# Patient Record
Sex: FEMALE | Race: WHITE | NOT HISPANIC OR LATINO | Employment: OTHER | URBAN - METROPOLITAN AREA
[De-identification: names, ages, dates, MRNs, and addresses within clinical notes are randomized per-mention and may not be internally consistent; named-entity substitution may affect disease eponyms.]

---

## 2020-01-13 ENCOUNTER — HOSPITAL ENCOUNTER (INPATIENT)
Facility: CLINIC | Age: 70
LOS: 14 days | Discharge: NURSING, REHAB OR LONG-TERM CARE | DRG: 020 | End: 2020-01-28
Attending: NEUROLOGICAL SURGERY | Admitting: NEUROLOGICAL SURGERY
Payer: MEDICARE

## 2020-01-13 ENCOUNTER — TRANSFERRED RECORDS (OUTPATIENT)
Dept: HEALTH INFORMATION MANAGEMENT | Facility: CLINIC | Age: 70
End: 2020-01-13

## 2020-01-13 DIAGNOSIS — Q28.3 CEREBRAL CAVERNOMA: Primary | ICD-10-CM

## 2020-01-13 DIAGNOSIS — D18.00 CAVERNOMA: ICD-10-CM

## 2020-01-13 LAB
CREAT SERPL-MCNC: 0.9 MG/DL (ref 0.52–1.04)
GLUCOSE SERPL-MCNC: 154 MG/DL (ref 74–99)
INR PPP: 0.9 (ref 0.9–3.9)
POTASSIUM SERPL-SCNC: 3.5 MMOL/L (ref 0.5–5.1)

## 2020-01-13 ASSESSMENT — MIFFLIN-ST. JEOR: SCORE: 1137.76

## 2020-01-13 NOTE — LETTER
Transition Communication Hand-off for Care Transitions to Next Level of Care Provider    Name: Sheila Barraza  : 1950  MRN #: [id number]  Primary Care Provider: Fior Jensen     Primary Clinic: [address] [address]     Reason for Hospitalization:  brain stem bleed  Cerebral cavernoma  AVM (arteriovenous malformation)  Admit Date/Time: 2020 11:46 PM  Discharge Date: 2020  Payor Source: Payor: MEDICARE / Plan: MEDICARE / Product Type: Medicare /              Reason for Communication Hand-off Referral:   Notification of the discharge plan.    Discharge Plan:      Social Work Services Discharge Note     Patient Name:  Sheila Barraza     Anticipated Discharge Date:  2020     Discharge Disposition:   [facility] ([phone])     Following MD:  Facility Assignment     Pre-Admission Screening (PAS) online form has been completed.  The Level of Care (LOC) is:  Determined  Confirmation Code is:  Not required as pt is admitting to acute rehab setting.  Patient/caregiver informed of referral to [facility] for Pre-Admission Screening for skilled nursing facility (SNF) placement and to expect a phone call post discharge from SNF.     Additional Services/Equipment Arranged:  SW confirmed readiness for discharge with Anna Holloway NP Neuro Surgery. SW confirmed acceptance for admit to  ARU (pending outcome of therapy session today to ensure that pt's blood pressures are stable) with Admissions ([place]).  URBANO arranged for [facility] ([place] [phone]) to provide w/c transport at 2pm.     Patient / Family response to discharge plan:  Pt,  and daughter (Rosaura) voice understanding of the discharge plan and agreement with the discharge plan.     Persons notified of above discharge plan:  Pt,  ([place]), daughter (Rosaura),  nursing and Anna Holloway NP.     Staff Discharge Instructions:  Please fax discharge orders and signed hard scripts  for any controlled substances.  Please print a packet and send with patient.      CTS Handoff completed:  YES     Medicare Notice of Rights provided to the patient/family:  YES     COLLIN Ceballos  Social Work, 6A  Phone:  [phone]  Pager:  [phone]  1/27/2020

## 2020-01-14 ENCOUNTER — APPOINTMENT (OUTPATIENT)
Dept: MRI IMAGING | Facility: CLINIC | Age: 70
DRG: 020 | End: 2020-01-14
Attending: STUDENT IN AN ORGANIZED HEALTH CARE EDUCATION/TRAINING PROGRAM
Payer: MEDICARE

## 2020-01-14 ENCOUNTER — APPOINTMENT (OUTPATIENT)
Dept: CT IMAGING | Facility: CLINIC | Age: 70
DRG: 020 | End: 2020-01-14
Attending: STUDENT IN AN ORGANIZED HEALTH CARE EDUCATION/TRAINING PROGRAM
Payer: MEDICARE

## 2020-01-14 ENCOUNTER — APPOINTMENT (OUTPATIENT)
Dept: SPEECH THERAPY | Facility: CLINIC | Age: 70
DRG: 020 | End: 2020-01-14
Attending: STUDENT IN AN ORGANIZED HEALTH CARE EDUCATION/TRAINING PROGRAM
Payer: MEDICARE

## 2020-01-14 ENCOUNTER — APPOINTMENT (OUTPATIENT)
Dept: PHYSICAL THERAPY | Facility: CLINIC | Age: 70
DRG: 020 | End: 2020-01-14
Attending: STUDENT IN AN ORGANIZED HEALTH CARE EDUCATION/TRAINING PROGRAM
Payer: MEDICARE

## 2020-01-14 PROBLEM — Q28.3 CEREBRAL CAVERNOMA: Status: ACTIVE | Noted: 2020-01-14

## 2020-01-14 LAB
ABO + RH BLD: NORMAL
ABO + RH BLD: NORMAL
ALBUMIN SERPL-MCNC: 3.3 G/DL (ref 3.4–5)
ALP SERPL-CCNC: 60 U/L (ref 40–150)
ALT SERPL W P-5'-P-CCNC: 25 U/L (ref 0–50)
ANION GAP SERPL CALCULATED.3IONS-SCNC: 6 MMOL/L (ref 3–14)
ANION GAP SERPL CALCULATED.3IONS-SCNC: 6 MMOL/L (ref 3–14)
APTT PPP: 32 SEC (ref 22–37)
AST SERPL W P-5'-P-CCNC: 12 U/L (ref 0–45)
BILIRUB SERPL-MCNC: 0.4 MG/DL (ref 0.2–1.3)
BLD GP AB SCN SERPL QL: NORMAL
BLOOD BANK CMNT PATIENT-IMP: NORMAL
BUN SERPL-MCNC: 12 MG/DL (ref 7–30)
BUN SERPL-MCNC: 12 MG/DL (ref 7–30)
CA-I SERPL ISE-MCNC: 5.3 MG/DL (ref 4.4–5.2)
CALCIUM SERPL-MCNC: 9.2 MG/DL (ref 8.5–10.1)
CALCIUM SERPL-MCNC: 9.5 MG/DL (ref 8.5–10.1)
CHLORIDE SERPL-SCNC: 108 MMOL/L (ref 94–109)
CHLORIDE SERPL-SCNC: 109 MMOL/L (ref 94–109)
CO2 SERPL-SCNC: 26 MMOL/L (ref 20–32)
CO2 SERPL-SCNC: 26 MMOL/L (ref 20–32)
CREAT SERPL-MCNC: 0.78 MG/DL (ref 0.52–1.04)
CREAT SERPL-MCNC: 0.85 MG/DL (ref 0.52–1.04)
ERYTHROCYTE [DISTWIDTH] IN BLOOD BY AUTOMATED COUNT: 11.8 % (ref 10–15)
ERYTHROCYTE [DISTWIDTH] IN BLOOD BY AUTOMATED COUNT: 11.8 % (ref 10–15)
FIBRINOGEN PPP-MCNC: 478 MG/DL (ref 200–420)
GFR SERPL CREATININE-BSD FRML MDRD: 69 ML/MIN/{1.73_M2}
GFR SERPL CREATININE-BSD FRML MDRD: 76 ML/MIN/{1.73_M2}
GLUCOSE BLDC GLUCOMTR-MCNC: 93 MG/DL (ref 70–99)
GLUCOSE BLDC GLUCOMTR-MCNC: 95 MG/DL (ref 70–99)
GLUCOSE SERPL-MCNC: 103 MG/DL (ref 70–99)
GLUCOSE SERPL-MCNC: 97 MG/DL (ref 70–99)
HCT VFR BLD AUTO: 36 % (ref 35–47)
HCT VFR BLD AUTO: 38.1 % (ref 35–47)
HGB BLD-MCNC: 12.8 G/DL (ref 11.7–15.7)
HGB BLD-MCNC: 13.2 G/DL (ref 11.7–15.7)
INR PPP: 1 (ref 0.86–1.14)
INTERPRETATION ECG - MUSE: NORMAL
MAGNESIUM SERPL-MCNC: 1.9 MG/DL (ref 1.6–2.3)
MAGNESIUM SERPL-MCNC: 1.9 MG/DL (ref 1.6–2.3)
MCH RBC QN AUTO: 33.1 PG (ref 26.5–33)
MCH RBC QN AUTO: 33.6 PG (ref 26.5–33)
MCHC RBC AUTO-ENTMCNC: 34.6 G/DL (ref 31.5–36.5)
MCHC RBC AUTO-ENTMCNC: 35.6 G/DL (ref 31.5–36.5)
MCV RBC AUTO: 95 FL (ref 78–100)
MCV RBC AUTO: 96 FL (ref 78–100)
MRSA DNA SPEC QL NAA+PROBE: NEGATIVE
PHOSPHATE SERPL-MCNC: 3.5 MG/DL (ref 2.5–4.5)
PHOSPHATE SERPL-MCNC: 3.7 MG/DL (ref 2.5–4.5)
PLATELET # BLD AUTO: 207 10E9/L (ref 150–450)
PLATELET # BLD AUTO: 247 10E9/L (ref 150–450)
POTASSIUM SERPL-SCNC: 3.6 MMOL/L (ref 3.4–5.3)
POTASSIUM SERPL-SCNC: 3.9 MMOL/L (ref 3.4–5.3)
PROT SERPL-MCNC: 6.6 G/DL (ref 6.8–8.8)
RBC # BLD AUTO: 3.81 10E12/L (ref 3.8–5.2)
RBC # BLD AUTO: 3.99 10E12/L (ref 3.8–5.2)
SODIUM SERPL-SCNC: 140 MMOL/L (ref 133–144)
SODIUM SERPL-SCNC: 142 MMOL/L (ref 133–144)
SPECIMEN EXP DATE BLD: NORMAL
SPECIMEN SOURCE: NORMAL
WBC # BLD AUTO: 6.4 10E9/L (ref 4–11)
WBC # BLD AUTO: 6.4 10E9/L (ref 4–11)

## 2020-01-14 PROCEDURE — 92610 EVALUATE SWALLOWING FUNCTION: CPT | Mod: GN

## 2020-01-14 PROCEDURE — 25000125 ZZHC RX 250: Performed by: STUDENT IN AN ORGANIZED HEALTH CARE EDUCATION/TRAINING PROGRAM

## 2020-01-14 PROCEDURE — 36415 COLL VENOUS BLD VENIPUNCTURE: CPT | Performed by: NEUROLOGICAL SURGERY

## 2020-01-14 PROCEDURE — 85730 THROMBOPLASTIN TIME PARTIAL: CPT | Performed by: NEUROLOGICAL SURGERY

## 2020-01-14 PROCEDURE — 82330 ASSAY OF CALCIUM: CPT | Performed by: NEUROLOGICAL SURGERY

## 2020-01-14 PROCEDURE — 25000128 H RX IP 250 OP 636: Performed by: STUDENT IN AN ORGANIZED HEALTH CARE EDUCATION/TRAINING PROGRAM

## 2020-01-14 PROCEDURE — 93010 ELECTROCARDIOGRAM REPORT: CPT | Performed by: INTERNAL MEDICINE

## 2020-01-14 PROCEDURE — 80053 COMPREHEN METABOLIC PANEL: CPT | Performed by: NEUROLOGICAL SURGERY

## 2020-01-14 PROCEDURE — 87641 MR-STAPH DNA AMP PROBE: CPT | Performed by: NEUROLOGICAL SURGERY

## 2020-01-14 PROCEDURE — 85610 PROTHROMBIN TIME: CPT | Performed by: NEUROLOGICAL SURGERY

## 2020-01-14 PROCEDURE — 25500064 ZZH RX 255 OP 636: Performed by: NEUROLOGICAL SURGERY

## 2020-01-14 PROCEDURE — 40000141 ZZH STATISTIC PERIPHERAL IV START W/O US GUIDANCE

## 2020-01-14 PROCEDURE — 25000132 ZZH RX MED GY IP 250 OP 250 PS 637: Performed by: STUDENT IN AN ORGANIZED HEALTH CARE EDUCATION/TRAINING PROGRAM

## 2020-01-14 PROCEDURE — 97162 PT EVAL MOD COMPLEX 30 MIN: CPT | Mod: GP

## 2020-01-14 PROCEDURE — 86850 RBC ANTIBODY SCREEN: CPT | Performed by: NEUROLOGICAL SURGERY

## 2020-01-14 PROCEDURE — 12000001 ZZH R&B MED SURG/OB UMMC

## 2020-01-14 PROCEDURE — 83735 ASSAY OF MAGNESIUM: CPT | Performed by: NEUROLOGICAL SURGERY

## 2020-01-14 PROCEDURE — 70544 MR ANGIOGRAPHY HEAD W/O DYE: CPT

## 2020-01-14 PROCEDURE — A9585 GADOBUTROL INJECTION: HCPCS | Performed by: NEUROLOGICAL SURGERY

## 2020-01-14 PROCEDURE — 97116 GAIT TRAINING THERAPY: CPT | Mod: GP

## 2020-01-14 PROCEDURE — 84100 ASSAY OF PHOSPHORUS: CPT | Performed by: NEUROLOGICAL SURGERY

## 2020-01-14 PROCEDURE — 70450 CT HEAD/BRAIN W/O DYE: CPT

## 2020-01-14 PROCEDURE — 80048 BASIC METABOLIC PNL TOTAL CA: CPT | Performed by: NEUROLOGICAL SURGERY

## 2020-01-14 PROCEDURE — 97530 THERAPEUTIC ACTIVITIES: CPT | Mod: GP

## 2020-01-14 PROCEDURE — 85027 COMPLETE CBC AUTOMATED: CPT | Performed by: NEUROLOGICAL SURGERY

## 2020-01-14 PROCEDURE — 92526 ORAL FUNCTION THERAPY: CPT | Mod: GN

## 2020-01-14 PROCEDURE — 70553 MRI BRAIN STEM W/O & W/DYE: CPT

## 2020-01-14 PROCEDURE — 85384 FIBRINOGEN ACTIVITY: CPT | Performed by: NEUROLOGICAL SURGERY

## 2020-01-14 PROCEDURE — 86900 BLOOD TYPING SEROLOGIC ABO: CPT | Performed by: NEUROLOGICAL SURGERY

## 2020-01-14 PROCEDURE — 25800030 ZZH RX IP 258 OP 636: Performed by: STUDENT IN AN ORGANIZED HEALTH CARE EDUCATION/TRAINING PROGRAM

## 2020-01-14 PROCEDURE — 00000146 ZZHCL STATISTIC GLUCOSE BY METER IP

## 2020-01-14 PROCEDURE — 86901 BLOOD TYPING SEROLOGIC RH(D): CPT | Performed by: NEUROLOGICAL SURGERY

## 2020-01-14 PROCEDURE — 87640 STAPH A DNA AMP PROBE: CPT | Performed by: NEUROLOGICAL SURGERY

## 2020-01-14 PROCEDURE — 25000132 ZZH RX MED GY IP 250 OP 250 PS 637: Mod: GY | Performed by: STUDENT IN AN ORGANIZED HEALTH CARE EDUCATION/TRAINING PROGRAM

## 2020-01-14 RX ORDER — LABETALOL 20 MG/4 ML (5 MG/ML) INTRAVENOUS SYRINGE
10 EVERY 10 MIN PRN
Status: DISCONTINUED | OUTPATIENT
Start: 2020-01-14 | End: 2020-01-28 | Stop reason: HOSPADM

## 2020-01-14 RX ORDER — POTASSIUM CHLORIDE 1.5 G/1.58G
20-40 POWDER, FOR SOLUTION ORAL
Status: DISCONTINUED | OUTPATIENT
Start: 2020-01-14 | End: 2020-01-28 | Stop reason: HOSPADM

## 2020-01-14 RX ORDER — GADOBUTROL 604.72 MG/ML
7.5 INJECTION INTRAVENOUS ONCE
Status: COMPLETED | OUTPATIENT
Start: 2020-01-14 | End: 2020-01-14

## 2020-01-14 RX ORDER — NALOXONE HYDROCHLORIDE 0.4 MG/ML
.1-.4 INJECTION, SOLUTION INTRAMUSCULAR; INTRAVENOUS; SUBCUTANEOUS
Status: DISCONTINUED | OUTPATIENT
Start: 2020-01-14 | End: 2020-01-22

## 2020-01-14 RX ORDER — HYDROMORPHONE HYDROCHLORIDE 1 MG/ML
INJECTION, SOLUTION INTRAMUSCULAR; INTRAVENOUS; SUBCUTANEOUS
Status: DISCONTINUED
Start: 2020-01-14 | End: 2020-01-14 | Stop reason: HOSPADM

## 2020-01-14 RX ORDER — POTASSIUM CHLORIDE 7.45 MG/ML
10 INJECTION INTRAVENOUS
Status: DISCONTINUED | OUTPATIENT
Start: 2020-01-14 | End: 2020-01-28 | Stop reason: HOSPADM

## 2020-01-14 RX ORDER — AMLODIPINE BESYLATE 5 MG/1
5 TABLET ORAL 2 TIMES DAILY
Status: ON HOLD | COMMUNITY
End: 2020-01-26

## 2020-01-14 RX ORDER — POTASSIUM CL/LIDO/0.9 % NACL 10MEQ/0.1L
10 INTRAVENOUS SOLUTION, PIGGYBACK (ML) INTRAVENOUS
Status: DISCONTINUED | OUTPATIENT
Start: 2020-01-14 | End: 2020-01-28 | Stop reason: HOSPADM

## 2020-01-14 RX ORDER — CLONAZEPAM 0.5 MG/1
0.5 TABLET ORAL 2 TIMES DAILY PRN
Status: DISCONTINUED | OUTPATIENT
Start: 2020-01-14 | End: 2020-01-28 | Stop reason: HOSPADM

## 2020-01-14 RX ORDER — HYDRALAZINE HYDROCHLORIDE 20 MG/ML
10-20 INJECTION INTRAMUSCULAR; INTRAVENOUS
Status: DISCONTINUED | OUTPATIENT
Start: 2020-01-14 | End: 2020-01-28 | Stop reason: HOSPADM

## 2020-01-14 RX ORDER — LISINOPRIL 10 MG/1
10 TABLET ORAL DAILY
Status: ON HOLD | COMMUNITY
End: 2020-01-26

## 2020-01-14 RX ORDER — MAGNESIUM SULFATE HEPTAHYDRATE 40 MG/ML
4 INJECTION, SOLUTION INTRAVENOUS EVERY 4 HOURS PRN
Status: DISCONTINUED | OUTPATIENT
Start: 2020-01-14 | End: 2020-01-28 | Stop reason: HOSPADM

## 2020-01-14 RX ORDER — ATORVASTATIN CALCIUM 10 MG/1
10 TABLET, FILM COATED ORAL DAILY
Status: ON HOLD | COMMUNITY
End: 2020-01-27

## 2020-01-14 RX ORDER — HYDROMORPHONE HCL/0.9% NACL/PF 0.2MG/0.2
0.2 SYRINGE (ML) INTRAVENOUS ONCE
Status: COMPLETED | OUTPATIENT
Start: 2020-01-14 | End: 2020-01-14

## 2020-01-14 RX ORDER — ACETAMINOPHEN 325 MG/1
325-650 TABLET ORAL EVERY 4 HOURS PRN
Status: DISCONTINUED | OUTPATIENT
Start: 2020-01-14 | End: 2020-01-28 | Stop reason: HOSPADM

## 2020-01-14 RX ORDER — POTASSIUM CHLORIDE 29.8 MG/ML
20 INJECTION INTRAVENOUS
Status: DISCONTINUED | OUTPATIENT
Start: 2020-01-14 | End: 2020-01-28 | Stop reason: HOSPADM

## 2020-01-14 RX ORDER — SODIUM CHLORIDE 9 MG/ML
INJECTION, SOLUTION INTRAVENOUS CONTINUOUS
Status: DISCONTINUED | OUTPATIENT
Start: 2020-01-14 | End: 2020-01-14

## 2020-01-14 RX ORDER — LISINOPRIL 20 MG/1
20 TABLET ORAL DAILY
Status: DISCONTINUED | OUTPATIENT
Start: 2020-01-14 | End: 2020-01-15

## 2020-01-14 RX ORDER — CLONAZEPAM 0.5 MG/1
0.5 TABLET ORAL 2 TIMES DAILY PRN
Status: ON HOLD | COMMUNITY
End: 2020-01-26

## 2020-01-14 RX ORDER — POTASSIUM CHLORIDE 750 MG/1
20-40 TABLET, EXTENDED RELEASE ORAL
Status: DISCONTINUED | OUTPATIENT
Start: 2020-01-14 | End: 2020-01-28 | Stop reason: HOSPADM

## 2020-01-14 RX ORDER — ATORVASTATIN CALCIUM 20 MG/1
20 TABLET, FILM COATED ORAL EVERY EVENING
Status: DISCONTINUED | OUTPATIENT
Start: 2020-01-14 | End: 2020-01-28 | Stop reason: HOSPADM

## 2020-01-14 RX ADMIN — Medication 2 G: at 05:30

## 2020-01-14 RX ADMIN — Medication 0.5 MG: at 10:04

## 2020-01-14 RX ADMIN — LISINOPRIL 20 MG: 20 TABLET ORAL at 11:46

## 2020-01-14 RX ADMIN — Medication 10 MEQ: at 06:44

## 2020-01-14 RX ADMIN — ATORVASTATIN CALCIUM 20 MG: 20 TABLET, FILM COATED ORAL at 19:37

## 2020-01-14 RX ADMIN — Medication 0.2 MG: at 01:51

## 2020-01-14 RX ADMIN — Medication 0.5 MG: at 17:17

## 2020-01-14 RX ADMIN — SODIUM CHLORIDE: 9 INJECTION, SOLUTION INTRAVENOUS at 01:15

## 2020-01-14 RX ADMIN — ACETAMINOPHEN 650 MG: 325 TABLET, FILM COATED ORAL at 17:14

## 2020-01-14 RX ADMIN — SODIUM CHLORIDE 1000 ML: 9 INJECTION, SOLUTION INTRAVENOUS at 01:30

## 2020-01-14 RX ADMIN — GADOBUTROL 7 ML: 604.72 INJECTION INTRAVENOUS at 11:13

## 2020-01-14 ASSESSMENT — ACTIVITIES OF DAILY LIVING (ADL)
ADLS_ACUITY_SCORE: 13

## 2020-01-14 NOTE — CONSULTS
Neuroscience Intensive Care Consult   Izabel Barraza is a 69-year-old female with a history of trigeminal neuralgia, hypertension on lisinopril and amlodipine, and hernia who presents with concern of stroke-like symptoms. Pt presented to OSH with wobbly gait, dysequilibrium, right arm and left paresthesia and feeling generally weak and inability to walk. She underwent CT head that showed hyperdensity in the pontomedullary area. The patient had similar presentation to her local hospital on 12/23 and also she was found to have hyperdense signal on CT head in pontomedullary area as well. Today since she had recurrence of her symptoms, so she was transferred to have further work up. Of note, the patient had had planned to be evaluated at the [facility] on 01/15/2020, but was unable to present due to this episode on 01/13/2020.    The patient states that her cavernoma is known to have existed since at least 2015.  However, there is report that states that there is concern for a large draining vein at the pontomedullary junction back in 2012, based on  MRI.         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    Family History: No family history on file.    Social History:   Social History     Tobacco Use     Smoking status: Not on file   Substance Use Topics     Alcohol use: Not on file       ROS: Review Of Systems  Skin: negative  Eyes: negative  Ears/Nose/Throat: negative  Respiratory: No shortness of breath, dyspnea on exertion, cough, or hemoptysis  Cardiovascular: negative  Gastrointestinal: negative  Genitourinary: negative  Musculoskeletal: negative  Psychiatric: negative  Hematologic/Lymphatic/Immunologic: negative  Endocrine: negative     Vital Signs:  B/P: Data Unavailable, T: Data Unavailable, P: Data Unavailable, R: Data Unavailable    Intake/Output Last 24Hrs :   Is charting     Infusions: NS 0.9% at  rate of 75 ml/hr     EVD Settings:  EVD @ not inserted   Output: na    Physical Examination:  General: NAD  HEENT: normocephalic atraumatic   Cardiovascular: RRR  Pulmonary: clear breath sounds BL   Neuro:  MS: AO X 3. No aphasia and no dysarthria.   CN: II-XII intact. Face symmetric and EOMI. PERRLA  Motor: no drift. 5/5 all through.   Reflexes: deferred   Sensation: LT, PP and positional sensation intact x 4 extremities   Coordination: FNF and HST are intact, no dysmetria  Gait: Not assessed due to pt sleeping.    Labs/Studies:  BMP: Unremarkable   CBC: unremarkable   INR: 1    Imaging:  Initial Head CT 01/13/20      ICH Score Tool Patient's Score   Age ? 80 years 1 point 0   GCS score  3-4  5-12   13-15    2 point  1 point  0 point 0   Hematoma volume, cm3 ? 30 1 point 0   Intraventricular extension 1 point 0   Infratentorial location 1 point 1   Patient s ICH Score (0-6) = 1       Assessment and Plan:  Sheila Barraza is a 69 year old year old with pontomedullary ICH, ICH Score 1.     Neuro:  #Pontomeduallry ICH, ICH Score = 1, NIHSS = 0, Etiology Cavernoma. Admitted for potential surgical treatment  Initial head CT shows small pontomedullary bleed .  - SBP Goal < 140  - Repeat head CT in 6 hours  - No indication for seizure ppx     # no evidence of Hydrocephalus    #Trigeminal Neuralgia  undergone 2 balloon rhizotomies at the [facility], on the left side.     Cardiovascular:  No acute issues    HTN- hold pta lisinopril and amlodipine     Continuous cardiac monitoring     Goal SBP <  140    Respiratory:  #no acute issues  -Pulse Ox    Gastrointestinal:  # npo for possible surgery    Renal:  #no acute issues   IVF: 75 ns 0.9%     Endocrine:  #glucose 95    Hematological  #no acute issues    ID  T max 98  WBC 6.4  - Pan-culture if spike fever     DVT ppx: SCD  Lines: art line,   Code: Full    KACY [facility]  Neurology PGY3  p [phone]

## 2020-01-14 NOTE — PLAN OF CARE
Discharge Planner PT   Patient plan for discharge: would prefer home over rehab  Current status: Amb ~250ft with single handhold on IV pole and CGAx1. 3-4 LOB to R/crossing feet L over R but able to correct every instance.  STS x4 during session with hand hold from PT or on IV pole and CGAx1-min Ax1 due to 2 occasions of minor LOB. Tranfers from chair to commode with min Ax1  Barriers to return to prior living situation: impaired balance, medical status  Recommendations for discharge: TCU currently but may be safe to discharge home with A if possible from family.   Rationale for recommendations: Pt will need A with  because she is his primary care taker.        Entered by: Gumaro Chacon 01/14/2020 12:13 PM

## 2020-01-14 NOTE — PLAN OF CARE
Major Shift Events:  Admitted to 4A, A&OX4, PERRL, Q1hr neruos, at 0600 check patient stated new tingling in RUE, denies numbness or tingling in LUE, LLE, RLE. Impaired balance, can not take more than a few steps without significant impairment. Goal of Systolic BP less than 140, maintained overnight; NSR with no signs of ectopy; lung sounds are clear bilaterally, sating adequately on room air; NPO for swallow eval today, 1 assist to the commode to void; denies pain. CT done overnight. Unable to place A line overnight, will discuss in AM.   Plan: MRI in the AM, decide whether to pursue surgery or manage medically.   For vital signs and complete assessments, please see documentation flowsheets.

## 2020-01-14 NOTE — PLAN OF CARE
OT 4A. Hold. OT orders acknowledged and appreciated. Per conversation with PT, pt primary deficits are balance at this time. PT to continue to follow to address functional mobility. OT to follow from a periphery.

## 2020-01-14 NOTE — CONSULTS
"Social Work: Assessment with Discharge Plan    Patient Name:  Sheila Barraza  :  1950  Age:  69 year old  MRN:  [id number]  Risk/Complexity Score:  Filed Complexity Screen Score: 9  Completed assessment with:  Patient, Calos (Spouse), Nica (Daughter) and Bulmaro (Son)    Presenting Information   Reason for Referral:  Discharge plan  Date of Intake:  2020  Referral Source:  Physician  Decision Maker:  Patient/ Self  Alternate Decision Maker:  [place] (Spouse) per [facility] Policy  Health Care Directive:  Declined completing  Living Situation:  Pt lives with her spouse (Calos) in a house in [place]  Previous Functional Status:  Independent  Patient and family understanding of hospitalization:  Pt has a good understanding, reports that providers are waiting to hear back from [facility] (she had an appointment at [facility] on 1/15)  Cultural/Language/Spiritual Considerations:  Yazidi  Adjustment to Illness:  Pt appears to be adjusting appropriately, reports that she is \"ready to get home and back to life\"    Physical Health  Reason for Admission:  Stroke-like symptoms, found to have pontomedullary ICH,   Services Needed/Recommended:  TCU, may progress to discharge home    Mental Health/Chemical Dependency  Diagnosis:  None  Support/Services in Place:  None  Services Needed/Recommended:  N/A    Support System  Significant relationship at present time:  Calos (Spouse)-  Pt is his caregiver.  Family of origin is available for support:  Nica (Daughter), Bulmaro (Son) and Grant (Son) all live nearby and could provide support PRN.  Other support available:  None  Gaps in support system:  None  Patient is caregiver to:  Spouse / Significant Other:  Per family, pt's spouse has numerous health issues.  Pt helps him with driving and managing his ileostomy.  Pt's children are caregiving for him while pt is hospitalized and can continue to assist while pt is recovering.     Provider Information   Primary Care Physician:  No " primary care provider on file.   None   Clinic:  No primary physician on file.      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  Financial Concerns:  None Identified  Insurance:    Payor/Plan Subscriber Name Rel Member # Group #   MEDICARE - MEDICARE TANIKA RIVAS [id number]       ATTN CLAIMS, [address]   BCBS - BCBS OUT OF ST* TANIKA RIVAS  [id number]      [address] [address]       Discharge Plan   Patient and family discharge goal:  Pt stated that he goal is to discharge home.  Provided education on discharge plan:  YES  Patient agreeable to discharge plan:  Pt is hoping to progress to discharge home  A list of Medicare Certified Facilities was provided to the patient and/or family to encourage patient choice. Patient's choices for facility are:  N/A  Will NH provide Skilled rehabilitation or complex medical:  YES  General information regarding anticipated insurance coverage and possible out of pocket cost was discussed. Patient and patient's family are aware patient may incur the cost of transportation to the facility, pending insurance payment: NO  Barriers to discharge:  Medical Stability    Discharge Recommendations   Anticipated Disposition:  TCU currently recommended, likely will progress to discharge home  Transportation Needs:  TBD  Name of Transportation Company and Phone:  TBD    Additional comments   URBANO met with pt, Calos (Spouse), Nica (Daughter) and Bulmaro (Son) to introduce self, explain role and provide support.  Pt/ family are from [place], family is staying at the [facility] while pt is hospitalized.  Pt's children are able to assist pt's spouse while she is hospitalized/ recovering (pt is his caregiver).  URBANO discussed current recommendation of a TCU, pt is hopeful that she will progress to discharge home.   will continue to follow to  provide support and continue discharge plans as identified.    ARTIS Arredondo, [facility]  ICU   [facility]  Phone:  [phone]  Pager:  [phone]

## 2020-01-14 NOTE — PROGRESS NOTES
"   01/14/20 0857   General Information   Onset Date 01/13/20   Start of Care Date 01/14/20   Referring Physician Champ Post MD   Patient Profile Review/OT: Additional Occupational Profile Info See Profile for full history and prior level of function   Patient/Family Goals Statement Pt did not state   Swallowing Evaluation Bedside swallow evaluation   Behaviorial Observations WFL (within functional limits)   Mode of current nutrition NPO   Respiratory Status Room air   Comments Izabel Barraza is a 69-year-old female with a history of trigeminal neuralgia, hypertension on lisinopril and amlodipine, and hernia who presents with concern of stroke-like symptoms. Pt presented to OSH with wobbly gait, dysequilibrium, right arm and left paresthesia and feeling generally weak and inability to walk. She underwent CT head that showed hyperdensity in the pontomedullary area. The patient had similar presentation to her local hospital on 12/23 and also she was found to have hyperdense signal on CT head in pontomedullary area as well. Today since she had recurrence of her symptoms, so she was transferred to have further work up. Of note, the patient had had planned to be evaluated at the [facility] on 01/15/2020, but was unable to present due to this episode on 01/13/2020. Pt endorses vague and transient symptoms of dysphagia marked by food/liquids getting \"hung up\" in her throat. Clinical swallow eval completed per MD orders to further assess oropharyngeal swallow function.    Clinical Swallow Evaluation   Oral Musculature   (minimal R sided facial weakness)   Structural Abnormalities none present   Dentition present and adequate   Mucosal Quality dry   Mandibular Strength and Mobility intact   Oral Labial Strength and Mobility WFL   Lingual Strength and Mobility WFL   Velar Elevation intact   Buccal Strength and Mobility intact   Laryngeal Function Cough;Throat clear;Swallow;Voicing initiated   Oral Musculature Comments " Minimal r sided facial weakness   Additional Documentation Yes   Clinical Swallow Eval: Thin Liquid Texture Trial   Mode of Presentation, Thin Liquids cup;self-fed   Volume of Liquid or Food Presented 4 oz   Oral Phase of Swallow Premature pharyngeal entry   Pharyngeal Phase of Swallow impaired;throat clearing   Diagnostic Statement thin liquids via cup resulted in throat clearing x1. no other overt s/sx of aspiration    Clinical Swallow Eval: Puree Solid Texture Trial   Mode of Presentation, Puree spoon;self-fed   Volume of Puree Presented 6 tbsp   Oral Phase, Puree WFL   Pharyngeal Phase, Puree intact   Diagnostic Statement Pt tolerated pureed textures via spoon with no overt s/sx of aspiration    Clinical Swallow Eval: Solid Food Texture Trial   Mode of Presentation, Solid self-fed   Volume of Solid Food Presented 4 tbsp   Oral Phase, Solid WFL   Pharyngeal Phase, Solid intact   Diagnostic Statement Pt tolerated regular solid textures with no overt s/sx of aspiration    VFSS Evaluation   VFSS Additional Documentation No   FEES Evaluation   Additional Documentation No   Swallow Compensations   Swallow Compensations No compensations were used   Results No compensations were used   General Therapy Interventions   Planned Therapy Interventions Dysphagia Treatment   Dysphagia treatment Compensatory strategies for swallowing;Instruction of safe swallow strategies   Swallow Eval: Clinical Impressions   Skilled Criteria for Therapy Intervention Skilled criteria met.  Treatment indicated.   Functional Assessment Scale (FAS) 5   Treatment Diagnosis mild oropharyngeal dysphagia    Diet texture recommendations Regular diet;Thin liquids   Recommended Feeding/Eating Techniques alternate between small bites and sips of food/liquid;check mouth frequently for oral residue/pocketing;hard swallow w/ each bite or sip;maintain upright posture during/after eating for 30 mins;small sips/bites   Demonstrates Need for Referral to Another  "Service physical therapy;occupational therapy;dietitian;respiratory therapy   Therapy Frequency 5x/week   Predicted Duration of Therapy Intervention (days/wks) 1 week   Anticipated Discharge Disposition inpatient rehabilitation facility   Risks and Benefits of Treatment have been explained. Yes   Patient, family and/or staff in agreement with Plan of Care Yes   Clinical Impression Comments SLP: Clinical swallow eval completed per MD orders. Pt presents with mild oropharyngeal dysphagia and minimal R sided facial weakness. Pt endorses transient difficulty swallowing for the past ~2 weeks marked by food/liquids getting \"hung up\" in throat. Thin liquids via cup resulted in throat clearing x1. No other overt s/sx of aspiration on thin liquids via cup, pureed textures, and regular solid textures. Recommend pt initiate regular textures and thin liquids. Pt should be fully upright for all PO, take small sips/bites, slow pacing, and alternate between consistencies. In the setting of pts brainstem cavernoma and new onset dysphagia, further recommend video swallow study to objectively assess aspiration risk. MD to order VFSS as appropriate pending surgical plans. ST to continue to follow. Pt may require ST follow up at discharge pending progress.    Total Evaluation Time   Total Evaluation Time (Minutes) 15     "

## 2020-01-14 NOTE — H&P
Admitted:     01/13/2020      NEUROSURGERY HISTORY AND PHYSICIAL      HISTORY OF PRESENT ILLNESS:  Izabel Barraza is a 69-year-old female with a history of trigeminal neuralgia who presented to an outside hospital with concern of stroke-like symptoms in the setting of a known brainstem cavernoma.      The patient had reported right-sided weakness and inability to stand at the outside hospital beginning at 0900 on 1/13/20  The patient underwent head CT that revealed a concern of hyperdensity in the pontomedullary junction.  Of note, the patient had actually presented to her local hospital on 12/23/2019, with similar symptoms.  The patient had also findings of a hyperdense lesion at that time at the pontomedullary junction.  She had intended to followup at the [facility] with her neurosurgeon regarding this lesion and for management of her trigeminal neuralgia.  Due to concern for recurrent symptoms, the patient was transferred down to the [facility] for further evaluation and treatment of a concern of a cerebral brainstem cavernoma.      The patient states that her cavernoma is known to have existed since at least 2015.  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 Of note, the patient has undergone 2 balloon rhizotomies at the [facility], on the left side.  She states that at this time, her deficits include imbalance with ambulation and dizziness.  She also has numbness in the left side of the face, though this is baseline since her rhizotomies.  She has been having difficulty swallowing and difficulty singing.  She denies blurry vision.  She further thinks that her strength is without deficit.  The patient previously had numbness and tingling in both arms; however, this has subsided.      Of note, the patient had had planned to be evaluated at the [facility] on 01/14/2020, but was unable to  present due to this episode on 01/13/2020.      PAST MEDICAL HISTORY:  Hypertension, hyperlipidemia, trigeminal neuralgia.        PAST SURGICAL HISTORY:  Two rhizotomies and inguinal hernia repair.      FAMILY HISTORY:  Notable for family members with trigeminal neuralgia.      SOCIAL HISTORY:  The patient is primary caretaker for her  who has Wegener's granulomatosis and also a pituitary tumor that has been resected.  The patient enjoys walking 4 miles daily.  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  She continues to live independently.  The patient has 2 children.      REVIEW OF SYSTEMS:  Negative unless as detailed in history of present illness.      PHYSICAL EXAMINATION:   Temp:  [97.6  F (36.4  C)-98.3  F (36.8  C)] 97.9  F (36.6  C)  Pulse:  [80-92] 92  Heart Rate:  [68-97] 68  Resp:  [16-18] 18  BP: ()/(58-94) 127/80  SpO2:  [92 %-100 %] 98 %     Awake and alert.   Oriented x 4.  Extraocular muscles are intact.     Left-sided facial sensation is diminished in all V1, V2 and V3 distributions.   Tongue protrusion symmetric.     Shoulder shrug is symmetric.   Absent gag reflex.   Palate elevation is minimal bilaterally.     5/5 strength in bilateral upper and lower extremities.   No pronator drift.   Rapid finger tapping bilaterally in both hands is symmetric.     No evidence of clonus or Mckeon's reflexes bilaterally.   No hyperreflexia at the biceps or patellar tendons.   Heel-shin is intact.   Standing is grossly impaired as is ambulating without assist.  The patient appears grossly discoordinated with ambulation.   SENSATION:  Grossly intact to light touch.      LABORATORY DATA:  Glucose is 95.   Labs remain pending at this time.      IMAGING:  Head CT reveals evidence of a cavernoma at the pontomedullary junction measuring about 11 mm in the AP diameter.  Hemorrhage edema is present in the medulla.   Cortical atrophy present.  No other supratentorial  hemorrhage.  No hydrocephalus at present.        ASSESSMENT:  Concern for brainstem cavernoma after hemorrhage and new neurologic deficits.      RECOMMENDATIONS:   1.  Admit to ICU.   2.  Systolic blood pressure less than 140.   3.  MRI brain with and without contrast, thin slices extending down to medulla.     4.  Surgical plan is pending.   5.  Two hour neuro checks.   6.  Continuous cardiac monitoring.   7.  Continuous pulse oximetry.     8.  Admission is warranted for frequent neuro checks.        This patient was discussed with Dr. Kemi Gage, who agrees with the above stated plan.         DAVID CAMPBELL MD       As dictated by BHAVNA LIRA MD            D: 2020   T: 2020   MT: JULIET      Name:     TANIKA RIVAS   MRN:      [id number]        Account:      [id number]   :      1950        Admitted:     2020                   Document: [id number]

## 2020-01-14 NOTE — PROGRESS NOTES
01/14/20 1200   Quick Adds   Type of Visit Initial PT Evaluation   Living Environment   Lives With spouse   Living Arrangements condominium   Home Accessibility no concerns   Transportation Anticipated family or friend will provide;car, drives self   Living Environment Comment At baseline pt cares for  (dressing changes, drives to appointments, minimal mobility A needed.)  safe to be home alone for periods of time. Pt is very active, before symtpoms started in mid Dec was walking 4 miles/day.  Since symptoms has been much more limited in activity. IND at baseline with all mobility and ADLs.     Self-Care   Usual Activity Tolerance excellent   Current Activity Tolerance moderate   Regular Exercise Yes   Activity/Exercise Type walking   Exercise Amount/Frequency   (4 miles /day)   Equipment Currently Used at Home none   Functional Level Prior   Ambulation 0-->independent   Transferring 0-->independent   Toileting 0-->independent   Bathing 0-->independent   Communication 0-->understands/communicates without difficulty   Swallowing 0-->swallows foods/liquids without difficulty   Cognition 0 - no cognition issues reported   Fall history within last six months no   Which of the above functional risks had a recent onset or change? ambulation;transferring   General Information   Onset of Illness/Injury or Date of Surgery - Date 01/13/20   Referring Physician Champ Post MD   Patient/Family Goals Statement return home   Pertinent History of Current Problem (include personal factors and/or comorbidities that impact the POC) 69-year-old female with a history of trigeminal neuralgia, hypertension, and hernia who presents with concern of stroke-like symptoms.  The patient had reported right-sided weakness and inability to stand at the outside hospital.  The patient underwent head CT that revealed a concern of hyperdensity in the pontomedullary junction.  Of note, the patient had actually presented to her local  [facility] on 12/23/2019, with similar symptoms.  The patient had also findings of a hyperdense lesion at that time at the pontomedullary junction.  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  The patient states that her cavernoma is known to have existed since at least 2015.  However, there is report that states that there is concern for a large draining vein at the pontomedullary junction back in 2012, based on  MRI.  The patient has undergone 2 balloon rhizotomies at the [facility], on the left side.  She states that at this time, the patient has had a tremendous imbalance and dizziness.  She also has numbness in the left side of the face.  She has been having difficulty swallowing and difficulty seeing.  She has had no geraldine diplopia, however.  She further thinks that her strength is at baseline.  The patient previously had numbness and tingling in both arms; however, this has subsided.    Precautions/Limitations fall precautions   Heart Disease Risk Factors Age   Cognitive Status Examination   Orientation orientation to person, place and time   Level of Consciousness alert   Follows Commands and Answers Questions 100% of the time   Personal Safety and Judgment intact   Memory intact   Posture    Posture Protracted shoulders;Kyphosis   Range of Motion (ROM)   ROM Comment WFL   Strength   Strength Comments WFL   Bed Mobility   Bed Mobility Comments not tested today   Transfer Skills   Transfer Comments STS with CGAx1-min A to maintain balance   Gait   Gait Comments Ambulates ~250ft with hand hold on IV pole and CGA-Malachi   Balance   Balance Comments fair standing balance   General Therapy Interventions   Planned Therapy Interventions balance training;bed mobility training;gait training;motor coordination training;neuromuscular re-education;strengthening;transfer  "training;risk factor education;home program guidelines;progressive activity/exercise   Clinical Impression   Criteria for Skilled Therapeutic Intervention yes, treatment indicated   PT Diagnosis impaired functional mobility   Influenced by the following impairments impaired balance   Functional limitations due to impairments IND mobility   Clinical Presentation Evolving/Changing   Clinical Presentation Rationale clinical judgement   Clinical Decision Making (Complexity) Moderate complexity   Therapy Frequency 5x/week   Predicted Duration of Therapy Intervention (days/wks) 1 wk   Anticipated Equipment Needs at Discharge front wheeled walker   Anticipated Discharge Disposition Home with Assist;Home with Outpatient Therapy   Risk & Benefits of therapy have been explained Yes   Patient, Family & other staff in agreement with plan of care Yes   [facility] TM \"6 Clicks\"   2016, Trustees of [facility], under license to [employer].  All rights reserved.   6 Clicks Short Forms Basic Mobility Inpatient Short Form   [facility]  \"6 Clicks\" V.2 Basic Mobility Inpatient Short Form   1. Turning from your back to your side while in a flat bed without using bedrails? 3 - A Little   2. Moving from lying on your back to sitting on the side of a flat bed without using bedrails? 2 - A Lot   3. Moving to and from a bed to a chair (including a wheelchair)? 3 - A Little   4. Standing up from a chair using your arms (e.g., wheelchair, or bedside chair)? 3 - A Little   5. To walk in hospital room? 3 - A Little   6. Climbing 3-5 steps with a railing? 2 - A Lot   Basic Mobility Raw Score (Score out of 24.Lower scores equate to lower levels of function) 16   Total Evaluation Time   Total Evaluation Time (Minutes) 10     "

## 2020-01-14 NOTE — PROGRESS NOTES
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 This morning able to work with Rehab and Speech. Met with patient and daughter this morning. Patient reports fatigue during December ultimately with some transient numbness of the right arm and family noting a facial droop. Admitted to the hospital for several days where reported her blood pressures were quite elevated. At discharge 10 mg amlodipine added to her regimen that already included 20 mg lisinopril. Yesterday started to have difficulty with walking, arm symptoms returned, prompting her to come in. Daughter reports patient is normally very functional and active at baseline, and is the caretaker for patient's .     Assessment:  Sheila Barraza is a 68 yo woman with PMH of trigeminal neuralgia and hypertension who presented with stroke-like symptoms and found to have pontomedullary ICH, ICH Score 1, transferred to [facility] for further work up and management.     Plan:  Neuro:  #Pontomeduallry ICH, ICH Score = 1, NIHSS = 0  # Cavernoma.   # No evidence of Hydrocephalus  Admitted for potential surgical treatment, also follows with Neuro at [facility]. Initial head CT shows small pontomedullary bleed .  - Details of surgical plan and coordination with [facility] by Neurosurgery  - SBP Goal < 140  - MRI today  - Neuro checks q2H    - No indication for seizure ppx      #Trigeminal Neuralgia  Prior balloon rhizotomies at the [facility], on the left side. Left sensory deficit.     Cardiovascular:  # Hypertension  - PTA lisinopril, will add amlodipine if needed to achieve SBP goals of < 140   - Continuous cardiac monitoring      Respiratory:  # No acute  issues  -Pulse Oximetry     Gastrointestinal:  # IBS   - Will clarify home medication and order    # Nutrition  - Speech following, cleared for regular diet.   - Video swallow recommended given sense of dysphagia     Renal:  # No acute issues   IVF: Discontinued now that cleared for diet     Endocrine:  # No acute issues, trending glucose levels     Hematological  # No acute issues     ID  No acute issues  - Pan-culture if spike fever      DVT ppx: SCD  Lines: PIV  Dispo: ICU    Code: Full    Patient seen and discussed with Dr. Vickie Jim MD PhD  Pulmonary Critical Care Fellow  [phone]      Prior to Admission Medications   Prior to Admission Medications   Prescriptions Last Dose Informant Patient Reported? Taking?   amLODIPine (NORVASC) 5 MG tablet 1/13/2020 at Unknown time  Yes Yes   Sig: Take 5 mg by mouth 2 times daily   atorvastatin (LIPITOR) 10 MG tablet 1/13/2020 at Unknown time  Yes Yes   Sig: Take 10 mg by mouth daily   clonazePAM (KLONOPIN) 0.5 MG tablet 1/13/2020 at Unknown time  Yes Yes   Sig: Take 0.5 mg by mouth 2 times daily as needed for anxiety   lisinopril (PRINIVIL/ZESTRIL) 10 MG tablet 1/13/2020 at Unknown time  Yes Yes   Sig: Take 10 mg by mouth daily   methylcellulose (CITRUCEL) powder 1/14/2020 at Unknown time  Yes Yes   Sig: Take 0.5 teaspoonful by mouth daily      Facility-Administered Medications: None     Medications     niCARdipine 40 mg in 200 mL 0.9% NaCl         atorvastatin  20 mg Oral or Feeding Tube QPM     HYDROmorphone (PF)         lisinopril  20 mg Oral or Feeding Tube Daily     Allergies   Allergies   Allergen Reactions     Beta Adrenergic Blockers      Fruit Extracts      Lasix [Furosemide]      Nuts      Onion      Sulfa Drugs      Tegretol [Carbamazepine]      Physical Exam   Objective:  Temp:  [97.6  F (36.4  C)-98  F (36.7  C)] 97.6  F (36.4  C)  Pulse:  [80-86] 80  Heart Rate:  [71-97] 97  Resp:  [16-18] 18  BP: ()/(64-88) 133/79  SpO2:  [96  %-100 %] 99 %  No data recorded    General: Alert, well-appearing, sitting up in chair, in no acute distress.  HEENT: Normocephalic, atraumatic. Patent nares.   Respiratory: Non-labored breathing. Lung sounds clear to auscultation bilaterally.   Cardiovascular: Regular rate and rhythm.   Gastrointestinal: Abdomen soft, non-distended, non-tender to palpation.   Extremities: Moving all four extremities. No limb deformities. No pedal edema.   Skin: As noted above. No rashes or lesions appreciated.    Neurologic Examination:  Mental Status: Awake and interactive. Oriented to person, place, time  Language: Speaks in full sentences at times, following all commands  Speech: Normal, fluent   Cranial Nerves: Pupils equal and reactive, Extraocular movements are intact, tongue midline, shoulder shrug intact  Motor: Moves all extremities spontaneously. Strength 4/4 in upper and lower extremities  Coordination: Able to perform finger nose, rapid alternating hand, and heel-shin maneuvers  Gait: Normal with assistance    Labs:  ABGNo results for input(s): PH, PCO2, PO2, HCO3 in the last 168 hours.    CBC  Recent Labs   Lab 01/14/20 0355 01/14/20 0226   WBC 6.4 6.4   HGB 12.8 13.2    207     COAGS  Recent Labs   Lab 01/14/20 0226   INR 1.00   PTT 32   FIBR 478*     INR:  Recent Labs   Lab Test 01/14/20 0226   INR 1.00      BMP  Recent Labs   Lab 01/14/20 0355 01/14/20 0226    140   POTASSIUM 3.6 3.9   CHLORIDE 109 108   CO2 26 26   BUN 12 12   CR 0.78 0.85   MICHAEL 9.2 9.5     Liver panel:  Recent Labs   Lab Test 01/14/20 0226   PROTTOTAL 6.6*   ALBUMIN 3.3*   BILITOTAL 0.4   ALKPHOS 60   AST 12   ALT 25     Imaging  CT HEAD W/O CONTRAST 1/14/2020 4:46 AM     Provided History: eval change in cavernoma hemorrhage.     Comparison: Outside study (in exceptions) 1/13/2020. Outside MRI  9/12/2012, 8/1/2012, outside CT 1/21/2011.     Technique: Using multidetector thin collimation helical acquisition  technique, axial,  coronal and sagittal CT images from the skull base  to the vertex were obtained without intravenous contrast.      Findings:    Hyperdensity within the brainstem at the level of the left inferior  cerebellar peduncle with some rightward extension into the medulla,  there is mild surrounding edema, most predominant on the right, lesion  measures 14 x 11 x 15 mm, this is minimally enlarged compared to  outside study dated 1/13/2020, at which time measured 13 x 9 x 15 mm.  Fourth ventricle is patent. There is calcified/hyperdense material  along the left petrous portion of the temporal bone. Notably, this  lesion is not defined on the CT scan outside from 1/21/2011, where  there is minimal dark signal on the MRI on T2-weighted images from  8/31/2012 with large venous angioma in that location.     No mass effect, or midline shift. The ventricles are proportionate to  the cerebral sulci. The gray to white matter differentiation of the  cerebral hemispheres is preserved. Minimal periventricular and deep  white matter hypoattenuation which is nonspecific though given  patient's age likely small vessel ischemic disease. Mild cerebral  atrophy. The basal cisterns are patent.     The visualized paranasal sinuses are clear. The mastoid air cells are  clear.                                                                    Impression:   Redemonstration of intraparenchymal hemorrhage from a presumed  cavernoma, of indeterminate age, within the left inferior brainstem  (inferior cerebellar peduncle) with mild surrounding edema as compared  to recent outside CT, as a venous angioma was present in this location  on an MRI from August 2012. This may be minimally increased in size,  but this could relate to technique, and a MRI would better evaluate  this and to evaluate for new components of hemorrhage.     [Result: Slight increase in size of brain stem hemorrhage. ]    MRI: Results pending

## 2020-01-14 NOTE — PROGRESS NOTES
Admitted/transferred from: [facility] in [place]  Reason for admission/transfer: Cavernous Hemangioma   2 RN skin assessment: completed by Tena MULLEN  Result of skin assessment and interventions/actions: No findings  Height, weight, drug calc weight: Done  Patient belongings (see Flowsheet)  MDRO education added to care planN/A  ?

## 2020-01-14 NOTE — PROGRESS NOTES
"SPIRITUAL HEALTH SERVICES  SPIRITUAL ASSESSMENT Progress Note  [facility] ([place] 4A     PRIMARY FOCUS:     Goals of care    Support for coping    REFERRAL SOURCE: Hospital  request    ILLNESS CIRCUMSTANCES:   Reviewed documentation. Reflective conversation shared with pt Sheila which integrated elements of illness and family narratives.     Context of Serious Illness/Symptom(s) - brain bleed    Resources for Support - Son/Daughter/Leia    DISTRESS:     Emotional/Spiritual/Existential Distress - Sheila is worried about her illness. This manifests through thoughts of not knowing whether to have a surgery or to just continue forward with her illness. Her daughter stated, \"It's like paying [place] roulette, if we just let her be\".     Amish Distress - no apparent distress    Social/Cultural/Economic Distress - no apparent distress. 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     SPIRITUAL/Shinto COPING:     Pentecostal/Leia - Ayla Kimball     Spiritual Practice(s) - Prayer    Emotional/Relational/Existential Connections - Sheila stated that, \"We have a lot of prayer chains\" and \"We are believers in the Lord\".     GOALS OF CARE:    Goals of Care - Sheila is unsure if she wants to have a surgery or not. She would like to know what the results will be and then discern with family as well.     Meaning/Sense-Making - Sheila stated that, \"I had an appointment at [facility] for today, but this is God's will for me to be here, to get a second opinion\".     PLAN: Sheila has requested the presence of a Congregation  and said, \"It is not an urgent request\" and \"I welcome all chaplains\". I will reach out to our Congregation  to visit with her. [facility] is available to Sheila per request.     Peace Kaba  Chaplain Resident  Pager [phone]    "

## 2020-01-14 NOTE — PROGRESS NOTES
"Left radial arterial line attempted. Unable to cannulate catheter despite multiple flashes of blood. I asked for consent to proceed with attempt on the right radial arterial line. The patient did not provide consent. Please continue with q15 minute cuff pressure readings.     Champ \"John\" MD [facility]   Neurosurgery, PGY-3    Please contact neurosurgery resident on call with questions.    Dial * * [phone], enter [phone] when prompted.     "

## 2020-01-14 NOTE — PLAN OF CARE
"Discharge Planner SLP   Patient plan for discharge: none stated   Current status: SLP: Clinical swallow eval completed per MD orders. Pt presents with mild oropharyngeal dysphagia and minimal R sided facial weakness. Pt endorses transient difficulty swallowing for the past ~2 weeks marked by food/liquids getting \"hung up\" in throat. Thin liquids via cup resulted in throat clearing x1. No other overt s/sx of aspiration on thin liquids via cup, pureed textures, and regular solid textures. Recommend pt initiate regular textures and thin liquids. Pt should be fully upright for all PO, take small sips/bites, slow pacing, and alternate between consistencies. In the setting of pts brainstem cavernoma and new onset dysphagia, further recommend video swallow study to objectively assess aspiration risk. MD to order VFSS as appropriate pending surgical plans. ST to continue to follow. Pt may require ST follow up at discharge pending progress.   Barriers to return to prior living situation: dysphagia, medical readiness   Recommendations for discharge: pt may require ST at discharge pending progress   Rationale for recommendations: pt would benefit from continued ST to train strategies to minimize aspiration risk        Entered by: Nicki Mitchell 01/14/2020 9:10 AM       "

## 2020-01-14 NOTE — PROGRESS NOTES
SPIRITUAL HEALTH SERVICES  SPIRITUAL ASSESSMENT Progress Note  [facility] ([place]) 4A     REFERRAL SOURCE: I visited with pt and family (, daughter, and son) per referral from unit  - pt was requesting support from [facility] .    Pt very pleasant, open, welcomes  support. Pt and family are active members of [facility] [employer]); pt shared regarding history of illness and events leading up to this hospitalization - she described the decision she is facing regarding potential surgery.  Pt requested prayer for her decision making process. I read scripture and shared prayer with pt and family. I let pt know I will be gone tomorrow but will return on Thursday.    PLAN: will check in on pt/family again this week.    Jose De Jesus Lay) Angie Fisher M.Div., [facility]  Staff   Pager [phone]

## 2020-01-14 NOTE — PROGRESS NOTES
"H&P dictated [id number]    Oaeks \"John\" MD [facility]   Neurosurgery, PGY-3    Please contact neurosurgery resident on call with questions.    Dial * * [phone], enter [phone] when prompted.     "

## 2020-01-14 NOTE — PLAN OF CARE
Neuro- Intact. Baseline L facial numbness. Has had intermittent R arm tingling but not today. Complained of ear popping- MD assessed and will reevaluate. MRI brain done.  Cardio: SR, stable BP, afebrile.  Resp: RA  GI: Speech eval- regular diet with thin liquid approved.  : Voids  Skin: Intact. Ambulates with gait belt and assist of 1.  Plan: Transfer to . Awaiting surgical plan.

## 2020-01-15 ENCOUNTER — APPOINTMENT (OUTPATIENT)
Dept: PHYSICAL THERAPY | Facility: CLINIC | Age: 70
DRG: 020 | End: 2020-01-15
Attending: NEUROLOGICAL SURGERY
Payer: MEDICARE

## 2020-01-15 ENCOUNTER — APPOINTMENT (OUTPATIENT)
Dept: GENERAL RADIOLOGY | Facility: CLINIC | Age: 70
DRG: 020 | End: 2020-01-15
Attending: STUDENT IN AN ORGANIZED HEALTH CARE EDUCATION/TRAINING PROGRAM
Payer: MEDICARE

## 2020-01-15 DIAGNOSIS — D18.00 CAVERNOMA: Primary | ICD-10-CM

## 2020-01-15 LAB
GLUCOSE BLDC GLUCOMTR-MCNC: 143 MG/DL (ref 70–99)
GLUCOSE BLDC GLUCOMTR-MCNC: 192 MG/DL (ref 70–99)
GLUCOSE BLDC GLUCOMTR-MCNC: 257 MG/DL (ref 70–99)
GLUCOSE BLDC GLUCOMTR-MCNC: 99 MG/DL (ref 70–99)
HBA1C MFR BLD: 6 % (ref 0–5.6)

## 2020-01-15 PROCEDURE — 12000001 ZZH R&B MED SURG/OB UMMC

## 2020-01-15 PROCEDURE — 99232 SBSQ HOSP IP/OBS MODERATE 35: CPT | Performed by: PHYSICIAN ASSISTANT

## 2020-01-15 PROCEDURE — 83036 HEMOGLOBIN GLYCOSYLATED A1C: CPT | Performed by: STUDENT IN AN ORGANIZED HEALTH CARE EDUCATION/TRAINING PROGRAM

## 2020-01-15 PROCEDURE — 97116 GAIT TRAINING THERAPY: CPT | Mod: GP

## 2020-01-15 PROCEDURE — 97110 THERAPEUTIC EXERCISES: CPT | Mod: GP

## 2020-01-15 PROCEDURE — 25000132 ZZH RX MED GY IP 250 OP 250 PS 637: Mod: GY | Performed by: STUDENT IN AN ORGANIZED HEALTH CARE EDUCATION/TRAINING PROGRAM

## 2020-01-15 PROCEDURE — 00000146 ZZHCL STATISTIC GLUCOSE BY METER IP

## 2020-01-15 PROCEDURE — 99207 ZZC CONSULT E&M CHANGED TO SUBSEQUENT LEVEL: CPT | Performed by: PHYSICIAN ASSISTANT

## 2020-01-15 PROCEDURE — 25000128 H RX IP 250 OP 636: Performed by: STUDENT IN AN ORGANIZED HEALTH CARE EDUCATION/TRAINING PROGRAM

## 2020-01-15 PROCEDURE — 36415 COLL VENOUS BLD VENIPUNCTURE: CPT | Performed by: STUDENT IN AN ORGANIZED HEALTH CARE EDUCATION/TRAINING PROGRAM

## 2020-01-15 PROCEDURE — 71046 X-RAY EXAM CHEST 2 VIEWS: CPT

## 2020-01-15 PROCEDURE — 25000132 ZZH RX MED GY IP 250 OP 250 PS 637: Mod: GY | Performed by: NURSE PRACTITIONER

## 2020-01-15 RX ORDER — DEXTROSE MONOHYDRATE 25 G/50ML
25-50 INJECTION, SOLUTION INTRAVENOUS
Status: DISCONTINUED | OUTPATIENT
Start: 2020-01-15 | End: 2020-01-27

## 2020-01-15 RX ORDER — POLYETHYLENE GLYCOL 3350 17 G/17G
17 POWDER, FOR SOLUTION ORAL 2 TIMES DAILY
Status: DISCONTINUED | OUTPATIENT
Start: 2020-01-15 | End: 2020-01-28 | Stop reason: HOSPADM

## 2020-01-15 RX ORDER — LISINOPRIL 20 MG/1
20 TABLET ORAL DAILY
Status: DISCONTINUED | OUTPATIENT
Start: 2020-01-18 | End: 2020-01-16

## 2020-01-15 RX ORDER — NICOTINE POLACRILEX 4 MG
15-30 LOZENGE BUCCAL
Status: DISCONTINUED | OUTPATIENT
Start: 2020-01-15 | End: 2020-01-27

## 2020-01-15 RX ORDER — BISACODYL 10 MG
10 SUPPOSITORY, RECTAL RECTAL DAILY PRN
Status: DISCONTINUED | OUTPATIENT
Start: 2020-01-15 | End: 2020-01-28 | Stop reason: HOSPADM

## 2020-01-15 RX ORDER — BISACODYL 5 MG
10 TABLET, DELAYED RELEASE (ENTERIC COATED) ORAL DAILY PRN
Status: DISCONTINUED | OUTPATIENT
Start: 2020-01-15 | End: 2020-01-28 | Stop reason: HOSPADM

## 2020-01-15 RX ORDER — DEXAMETHASONE SODIUM PHOSPHATE 4 MG/ML
4 INJECTION, SOLUTION INTRA-ARTICULAR; INTRALESIONAL; INTRAMUSCULAR; INTRAVENOUS; SOFT TISSUE EVERY 8 HOURS
Status: DISCONTINUED | OUTPATIENT
Start: 2020-01-15 | End: 2020-01-17

## 2020-01-15 RX ORDER — LISINOPRIL 20 MG/1
20 TABLET ORAL DAILY
Status: COMPLETED | OUTPATIENT
Start: 2020-01-16 | End: 2020-01-16

## 2020-01-15 RX ADMIN — Medication 0.5 MG: at 18:47

## 2020-01-15 RX ADMIN — Medication 0.5 MG: at 08:19

## 2020-01-15 RX ADMIN — ATORVASTATIN CALCIUM 20 MG: 20 TABLET, FILM COATED ORAL at 20:31

## 2020-01-15 RX ADMIN — BISACODYL 10 MG: 5 TABLET, COATED ORAL at 18:43

## 2020-01-15 RX ADMIN — DEXAMETHASONE SODIUM PHOSPHATE 4 MG: 4 INJECTION, SOLUTION INTRAMUSCULAR; INTRAVENOUS at 23:55

## 2020-01-15 RX ADMIN — DEXAMETHASONE SODIUM PHOSPHATE 4 MG: 4 INJECTION, SOLUTION INTRAMUSCULAR; INTRAVENOUS at 08:19

## 2020-01-15 RX ADMIN — LISINOPRIL 20 MG: 20 TABLET ORAL at 08:19

## 2020-01-15 RX ADMIN — DEXAMETHASONE SODIUM PHOSPHATE 4 MG: 4 INJECTION, SOLUTION INTRAMUSCULAR; INTRAVENOUS at 16:02

## 2020-01-15 ASSESSMENT — ACTIVITIES OF DAILY LIVING (ADL)
ADLS_ACUITY_SCORE: 14
ADLS_ACUITY_SCORE: 15
ADLS_ACUITY_SCORE: 14
ADLS_ACUITY_SCORE: 15

## 2020-01-15 NOTE — CONSULTS
[facility], [place]  Consult Note - Hospitalist Service, Gold 6     Date of Admission:  1/13/2020  Consult Requested by: Dr. 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 Medicine consulted today for preoperative evaluation prior to sub-occipital craniotomy for resection of cavernoma.    # Preoperative risk assessment: EKG with NSR, no ischemic changes. Patient is euvolemic at this time and has been at quite good for age functional status prior to her ICH. Denies any history of chest pain. Able to ambulate around the house, fold laundry, etc without dyspnea or chest pain. She does have a history of JIMENEZ, for which she uses CPAP.     Patient's cardiac risk by revised cardiac risk index is 0.5%. Her mild chronic conditions make the patient ASA class II. ACS NSQIP lists her cardiac risk similarly at 0.2% with her risk of death 0.7%. She has not required urgent intubation in the past. It is certainly possible that she might require a longer time to recover from anesthesia than normal.      Patient is currently euvolemic and is not on supplemental oxygen. Patient is not showing any signs/sx of any other active process at this time.      I do not think that any further testing is indicated at this time as all of her chronic issues seem to be maximized. I discussed the risks listed above with the patient and her family and my recommendations for no further testing or change in treatment prior to proceeding with surgery.     Management of Medications & Comorbidities.   - Hold lisinopril on day of surgery and resume as soon as possible post-operatively.        I have discussed our findings and recommendations with primary team. Thank you for this  opportunity to be involved in your patient's care.        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 Medicine consulted today for preoperative evaluation prior to sub-occipital craniotomy for resection of cavernoma. Tentatively planning for surgery on Friday 1/17/19.    Patient was feeling at her quite functional baseline prior to the onset of her right-sided weakness. She lives independently with her . She typically walks 3-4 miles daily without any chest pain or dyspnea. She is able to perform ADL's. She does not have any recollection of ever being limited by dyspnea or chest pain. She has not had any fevers, rashes, N/V/D, urinary symptoms.     Cardiovascular: does not have underlying cardiac disease. Currently, denies any chest pain, palpitations, dyspnea, exertional symptoms.      Pulmonary: does not have underlying lung disease. Currently, denies any cough or dyspnea.      Prior Anesthesia: Yes, without complications.   History of DVT/PE: No     JIMENEZ: Yes. Documented hx of JIMENEZ in chart, no sleep study available for review. Pt reports sleep study was negative and she uses CPAP only for trigeminal neuralgia.     History of stroke - YES. Date-current ICH., seizure - No, kidney disease - No, liver disease - No, thyroid disease - No, diabetes - No, neck/jaw pain or stiffness - No      Review of Systems   The 10 point Review of Systems is negative other than noted in the HPI or here.     Past Medical  History    I have reviewed this patient's medical history and updated it with pertinent information if needed.   Past Medical History:   Diagnosis Date     Dyslipidemia      Hypertension        Past Surgical History   I have reviewed this patient's surgical history and updated it with pertinent information if needed.  Past Surgical History:   Procedure Laterality Date     HERNIA REPAIR         Social History   I have reviewed this patient's social history and updated it with pertinent information if needed.  Social History     Tobacco Use     Smoking status: Never Smoker     Smokeless tobacco: Never Used   Substance Use Topics     Alcohol use: None     Drug use: None       Family History   I have reviewed this patient's family history and updated it with pertinent information if needed.   Family History   Problem Relation Age of Onset     Myocardial Infarction Father 54       Medications   Current Facility-Administered Medications   Medication     acetaminophen (TYLENOL) tablet 325-650 mg     atorvastatin (LIPITOR) tablet 20 mg     bisacodyl (DULCOLAX) EC tablet 10 mg     clonazePAM (klonoPIN) half-tab 0.5 mg     dexamethasone (DECADRON) injection 4 mg     glucose gel 15-30 g    Or     dextrose 50 % injection 25-50 mL    Or     glucagon injection 1 mg     hydrALAZINE (APRESOLINE) injection 10-20 mg     insulin aspart (NovoPen ECHO/NovoLOG) cartridge     insulin aspart (NovoPen ECHO/NovoLOG) cartridge     labetalol (NORMODYNE/TRANDATE) syringe 10 mg     lisinopril (PRINIVIL/ZESTRIL) tablet 20 mg     magnesium sulfate 2 g in NS intermittent infusion (PharMEDium or FV Cmpd)     magnesium sulfate 4 g in 100 mL sterile water (premade)     naloxone (NARCAN) injection 0.1-0.4 mg     oxyCODONE IR (ROXICODONE) half-tab 2.5-5 mg     pantoprazole (PROTONIX) 40 mg IV push injection     potassium chloride (KLOR-CON) Packet 20-40 mEq     potassium chloride 10 mEq in 100 mL intermittent infusion with 10 mg lidocaine     potassium  chloride 10 mEq in 100 mL sterile water intermittent infusion (premix)     potassium chloride 20 mEq in 50 mL intermittent infusion     potassium chloride ER (K-DUR/KLOR-CON M) CR tablet 20-40 mEq     sodium phosphate 10 mmol in D5W intermittent infusion     sodium phosphate 15 mmol in D5W intermittent infusion     sodium phosphate 20 mmol in D5W intermittent infusion     sodium phosphate 25 mmol in D5W intermittent infusion       Allergies   Allergies   Allergen Reactions     Beta Adrenergic Blockers      Fruit Extracts      Lasix [Furosemide]      Nuts      Onion      Sulfa Drugs      Tegretol [Carbamazepine]        Physical Exam   Vital Signs: Temp: 97.6  F (36.4  C) Temp src: Oral BP: 112/73 Pulse: 92 Heart Rate: 76 Resp: 16 SpO2: 98 % O2 Device: None (Room air)    Weight: 137 lbs 1.6 oz    Constitutional: Awake and alert, in no apparent distress. Sitting up comfortably in bed.   Eyes: Sclera clear, anicteric   ENT: Mucous membranes moist. Oropharynx clear.   Respiratory: Breathing comfortably on room air. Clear to auscultation bilaterally with no crackles, wheezing, or rhonchi. Good air entry throughout.   Cardiovascular:  RRR, normal S1/S2. No rubs or murmurs. Intact bilateral pedal pulses. No peripheral edema. No JVD.  GI: Soft, non-tender, non-distended. Bowel sounds present in all four quadrants.   Skin: Warm and dry. Good color. No jaundice. No visible rashes, lesions, or bruising of concern.   Neurologic: Alert and fully oriented. No tremor. Moving all extremities.     Data    I personally reviewed the following studies:    EKG 1/4/20: NSR with rate of 84. No T-wave abnormalities, ST depression/elevation, or other ischemic changes. QTc 420.     CXR 1/15/20: No acute pathology.     ROUTINE IP LABS (Last four results)  Recent Labs   Lab 01/14/20  0355 01/14/20  0226    140   POTASSIUM 3.6 3.9   CHLORIDE 109 108   CO2 26 26   ANIONGAP 6 6   GLC 97 103*   BUN 12 12   CR 0.78 0.85   MICHAEL 9.2 9.5   MAG 1.9  1.9   PHOS 3.7 3.5   PROTTOTAL  --  6.6*   ALBUMIN  --  3.3*   BILITOTAL  --  0.4   ALKPHOS  --  60   AST  --  12   ALT  --  25     Recent Labs   Lab 01/14/20  0355 01/14/20 0226   WBC 6.4 6.4   RBC 3.81 3.99   HGB 12.8 13.2   HCT 36.0 38.1   MCV 95 96   MCH 33.6* 33.1*   MCHC 35.6 34.6   RDW 11.8 11.8    207     Recent Labs   Lab 01/14/20 0226   INR 1.00

## 2020-01-15 NOTE — PLAN OF CARE
Status: Transferred to  from  around 2100.  Vitals: VSS, RA. CPAP on at bedtime. Continuous cardiac monitoring.  Neuros: A&O x4, can be forgetful. L sided facial numbness at baseline.  IV: PIV SL.  Resp/trach: LS clear & equal.  Diet: Regular diet.  Bowel status: BS+, last BM reportedly 1/12/2020.  : Voiding spontaneously.  Skin: Intact.  Pain: Denies pain.  Activity: Ax1 w/GB. Ambulated to the bathroom.  Social:  at the bedside for transfer. Once settled, he went home and will return.   Plan: Will continue to monitor and follow POC.

## 2020-01-15 NOTE — PROGRESS NOTES
"S: no events overnight. Transferred to floor     O:  Temp:  [97.6  F (36.4  C)-98.3  F (36.8  C)] 97.6  F (36.4  C)  Pulse:  [87-92] 92  Heart Rate:  [68-97] 76  Resp:  [16-18] 16  BP: (112-142)/(58-94) 112/73  SpO2:  [92 %-99 %] 98 %    Awake and alert.   Oriented x 4.    CN: Extraocular muscles are intact.   Left-sided facial sensation is diminished in all V1, V2 and V3 distributions. Tongue protrusion symmetric.   Shoulder shrug is symmetric.  Absent gag reflex.  Palate elevation is minimal bilaterally.     5/5 strength in bilateral upper and lower extremities.   Finger nose finger intact   No pronator drift.   Grossly intact to light touch.     Assessment:   Brainstem cavernoma status post hemorrhage awaiting surgical resection    Plan:   Surgical resection tentatively on 1/16 or 1/17; awaiting Sub-occipital craniotomy for resection of cavernoma  Ok for diet today  SBP < 140  Ok for q4h neuro checks  Activity: ok for out of bed with assist; patient is a falls risk  Medicine pre-operative clearance   Dispo: 6A, planning return to 4A post-op     Oakes \"John\" MD [place]   Neurosurgery, PGY-3    Please contact neurosurgery resident on call with questions.    Dial * * [phone], enter [phone] when prompted.     "

## 2020-01-15 NOTE — PLAN OF CARE
ST session cancelled. Attempted to see pt for dysphagia tx, however pt busy with other providers upon multiple attempts. Will follow up as appropriate.

## 2020-01-15 NOTE — PROGRESS NOTES
Transferred to: Unit 6A at 2030  Belongings: Watch, two rings, earrings, CPAP and clothing  Maldonado removed? N/a no maldonado present  Central line removed? NA  Chart and medications sent with patient Yes  Family notified: Yes, at bedside.    Sophy Aldridge RN

## 2020-01-15 NOTE — PLAN OF CARE
PT - Contact guard A with use of gait belt  Discharge Planner PT   Patient plan for discharge: not discussed  Current status: Pt independent with bed mobility. Pt performs transfers with SBA-min A from low surfaces. Pt ambulates 300ft with hand held assist, progressing to no UE support and use of gait belt for safety. Pt participates in LE strengthening exercises. Experiencing dizziness during session, BP shows normal response to exercise and position change.  Barriers to return to prior living situation: medical status, falls risk  Recommendations for discharge: Home with family assist. Pending pt's level of family assist available, pt would benefit from ARU  Rationale for recommendations: Pt is well below her baseline, and is highly motivated in therapies. Pt is primary caretaker of her . Pt would benefit from continued skilled rehab to progress balance, strength, gait and independence with functional mobility and ADLs/IADLs for safe discharge home.  Entered by: Lila Lam 01/15/2020 4:25 PM

## 2020-01-15 NOTE — PROGRESS NOTES
"  Care Coordinator Progress Note    Admission Date/Time:  1/13/2020  Attending MD:  Dr Jarek Pierre    Data  Received Care Coordinator consult for stroke. Chart reviewed, discussed with interdisciplinary team. In 6A Discharge Rounds Anna Holloway NP, reported plan is surgery on Friday, 1-17. Per Neurosurgery note the planned surgery is a sub-occipital craniotomy for resection of cavernoma.    On 1-14 PT recommended: \"TCU currently but may be safe to discharge home with A if possible from family.\"  Would need to assess how much assist pt would have at home. She is the caregiver for her . Social Work note from 1-14 reports pt does have 1 daughter & 2 sons who could provide support.     Patient was admitted for:  Brainstem cavernoma status post hemorrhage (pontomedullary ICH)  On 1- pt transferred by ambulance from [facility] in [place], [place] (per paper chart).     Hx of trigeminal neuralgia; hypertension.    Assessment  Pt with brainstem cavernoma s/p hemorrhage. Awaiting surgery. Unable to determine discharge discharge disposition at this time.      Plan  Surgery on Friday, 1-17. RNCC will continue to follow for plan of care, pt's progress and any discharge needs.        Yanely Whiting RN Care Coordinator  Unit [facility]          "

## 2020-01-15 NOTE — PLAN OF CARE
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 On Continuous cardiac monitoring.  Neuros: A&O x4, can be forgetful. L sided facial numbness at baseline.  IV: PIV SL.  Resp: LS clear  Diet: Regular  Bowel status: BS+, last BM 1/12/2020, pt requested Dulcolax for later today, on charge RN's list. Pt has Citrucel powder in small food container at bedside, made charge RN aware.  : Voiding spontaneously.  Skin: Intact.  Pain: Denies pain.  Activity: Up with A1 w/ GB. Pt wants to walk during the day, doesn't want a walker so that she can work on her  balance   Social:  will return today per pt   Plan: Pt would like Dulcolax & antianxiety med with AM meds.Will continue to monitor and follow POC.

## 2020-01-16 ENCOUNTER — APPOINTMENT (OUTPATIENT)
Dept: SPEECH THERAPY | Facility: CLINIC | Age: 70
DRG: 020 | End: 2020-01-16
Attending: NEUROLOGICAL SURGERY
Payer: MEDICARE

## 2020-01-16 ENCOUNTER — APPOINTMENT (OUTPATIENT)
Dept: PHYSICAL THERAPY | Facility: CLINIC | Age: 70
DRG: 020 | End: 2020-01-16
Attending: NEUROLOGICAL SURGERY
Payer: MEDICARE

## 2020-01-16 LAB
GLUCOSE BLDC GLUCOMTR-MCNC: 128 MG/DL (ref 70–99)
GLUCOSE BLDC GLUCOMTR-MCNC: 130 MG/DL (ref 70–99)
GLUCOSE BLDC GLUCOMTR-MCNC: 149 MG/DL (ref 70–99)
GLUCOSE BLDC GLUCOMTR-MCNC: 152 MG/DL (ref 70–99)
GLUCOSE BLDC GLUCOMTR-MCNC: 160 MG/DL (ref 70–99)

## 2020-01-16 PROCEDURE — 97530 THERAPEUTIC ACTIVITIES: CPT | Mod: GP

## 2020-01-16 PROCEDURE — 25000132 ZZH RX MED GY IP 250 OP 250 PS 637: Mod: GY | Performed by: STUDENT IN AN ORGANIZED HEALTH CARE EDUCATION/TRAINING PROGRAM

## 2020-01-16 PROCEDURE — 12000001 ZZH R&B MED SURG/OB UMMC

## 2020-01-16 PROCEDURE — 97116 GAIT TRAINING THERAPY: CPT | Mod: GP

## 2020-01-16 PROCEDURE — 25000128 H RX IP 250 OP 636: Performed by: STUDENT IN AN ORGANIZED HEALTH CARE EDUCATION/TRAINING PROGRAM

## 2020-01-16 PROCEDURE — 00000146 ZZHCL STATISTIC GLUCOSE BY METER IP

## 2020-01-16 PROCEDURE — 92526 ORAL FUNCTION THERAPY: CPT | Mod: GN

## 2020-01-16 PROCEDURE — 97112 NEUROMUSCULAR REEDUCATION: CPT | Mod: GP

## 2020-01-16 RX ORDER — VITAMIN B COMPLEX
1000 TABLET ORAL DAILY
Status: DISCONTINUED | OUTPATIENT
Start: 2020-01-16 | End: 2020-01-28 | Stop reason: HOSPADM

## 2020-01-16 RX ORDER — LISINOPRIL 20 MG/1
20 TABLET ORAL DAILY
Status: COMPLETED | OUTPATIENT
Start: 2020-01-17 | End: 2020-01-21

## 2020-01-16 RX ADMIN — ACETAMINOPHEN 650 MG: 325 TABLET, FILM COATED ORAL at 12:52

## 2020-01-16 RX ADMIN — DEXAMETHASONE SODIUM PHOSPHATE 4 MG: 4 INJECTION, SOLUTION INTRAMUSCULAR; INTRAVENOUS at 22:21

## 2020-01-16 RX ADMIN — DEXAMETHASONE SODIUM PHOSPHATE 4 MG: 4 INJECTION, SOLUTION INTRAMUSCULAR; INTRAVENOUS at 14:46

## 2020-01-16 RX ADMIN — POLYETHYLENE GLYCOL 3350 17 G: 17 POWDER, FOR SOLUTION ORAL at 19:23

## 2020-01-16 RX ADMIN — Medication 0.5 MG: at 19:23

## 2020-01-16 RX ADMIN — DEXAMETHASONE SODIUM PHOSPHATE 4 MG: 4 INJECTION, SOLUTION INTRAMUSCULAR; INTRAVENOUS at 07:32

## 2020-01-16 RX ADMIN — ATORVASTATIN CALCIUM 20 MG: 20 TABLET, FILM COATED ORAL at 19:23

## 2020-01-16 RX ADMIN — MELATONIN 1000 UNITS: at 17:31

## 2020-01-16 RX ADMIN — Medication 0.5 MG: at 07:32

## 2020-01-16 RX ADMIN — POLYETHYLENE GLYCOL 3350 17 G: 17 POWDER, FOR SOLUTION ORAL at 07:32

## 2020-01-16 RX ADMIN — LISINOPRIL 20 MG: 20 TABLET ORAL at 07:32

## 2020-01-16 ASSESSMENT — ACTIVITIES OF DAILY LIVING (ADL)
ADLS_ACUITY_SCORE: 15

## 2020-01-16 ASSESSMENT — VISUAL ACUITY
OU: NORMAL ACUITY;GLASSES
OU: NORMAL ACUITY;GLASSES

## 2020-01-16 NOTE — CONSULTS
Stroke Education Note    The following information has been reviewed with the patient and family:    1. Warning signs of stroke    2. Calling 911 if having warning signs of stroke    3. All modifiable risk factors: hypertension, CAD, atrial fib, diabetes, hypercholesterolemia, smoking, substance abuse, diet, physical inactivity, obesity, sleep apnea.    4. Patient's risk factors for stroke which include: HTN, HLD, and diet    5. Follow-up plan for after discharge    6. Discharge medications which include: Lisinopril, Lipitor    In addition, the PLC Stroke Class Handout has been given to the patient and family.    Learner's response to risk factors / lifestyle modification education: Taking steps     TITA Nguyen RN

## 2020-01-16 NOTE — PROGRESS NOTES
"S: no events overnight. Awaiting surgery    O:  Temp:  [97.3  F (36.3  C)-98.1  F (36.7  C)] 98.1  F (36.7  C)  Heart Rate:  [] 84  Resp:  [16-20] 16  BP: (115-154)/(71-85) 127/74  SpO2:  [97 %-100 %] 100 %    Awake and alert.   Oriented x 4.    CN: Extraocular muscles are intact.   Left-sided facial sensation is diminished in all V1, V2 and V3 distributions. Tongue protrusion symmetric.   Shoulder shrug is symmetric.    5/5 strength in bilateral upper and lower extremities.   Finger nose finger intact   No pronator drift.   Grossly intact to light touch.     Assessment:   Brainstem cavernoma status post hemorrhage awaiting surgical resection    Plan:   Surgical resection tentatively 1/17; awaiting Sub-occipital craniotomy for resection of cavernoma  Ok for diet today  SBP < 140  Ok for q4h neuro checks  Activity: ok for out of bed with assist; patient is a falls risk  Dispo: 6A, planning return to 4A post-op     Oakes \"John\" MD [facility]   Neurosurgery, PGY-3    Please contact neurosurgery resident on call with questions.    Dial * * [phone], enter [phone] when prompted.     "

## 2020-01-16 NOTE — PLAN OF CARE
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   Vitals: VSS on RA. CCM   Neuros: AO4, strength 5/5. Forgetful. L facial numbness at baseline. No numbness/tingling reported to RUE   IV: PIV SL  Resp/trach: LS clear throughout   Diet: Regular diet, good intake. Refusing insulin correction   Bowel status: Last BM 1/12. Stool softeners given  : Voiding spontaneously   Skin: Intact   Pain: Denies   Activity: Up with assist of 1, unsteady gait. Ambulated halls x 3  Social: Family at bedside, supportive   Plan: Plan for sub-occipital craniotomy for resection of cavernoma tomorrow. Continue to monitor and follow POC

## 2020-01-16 NOTE — PLAN OF CARE
Discharge Planner SLP   Patient plan for discharge: none stated   Current status: SLP: Pt with functional tolerance of current diet level. Recommend pt continue regular textures and thin liquids. Pt denied any globus sensation today. Per chart review, pt with sub-occipital craniotomy for resection of cavernoma scheduled tomorrow (1/17). Will resume tx post-operatively pending any dysphagia or speech/language concerns.   Barriers to return to prior living situation: surgery, intermittent dysphagia   Recommendations for discharge: TBD pending post-operative course   Rationale for recommendations: pt will likely require ST follow up post-operatively       Entered by: Nicki Mitchell 01/16/2020 11:24 AM

## 2020-01-16 NOTE — PLAN OF CARE
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   Vitals: VSS on RA. CCM   Neuros: AO4, strength 5/5. Forgetful. Denies any L facial numbness. Blurred vision out of L eye   IV: PIV SL  Resp/trach: LS clear throughout   Diet: Regular diet, good intake  Bowel status: Last BM 1/12. Stool softeners given   : Voiding spontaneously   Skin: Intact   Pain: Denies   Activity: Up with assist of 1, unsteady gait. Ambulated halls x 2  Social: Family at bedside, supportive   Plan: Per MD note, tentative surgery planned for sub-occipital craniotomy for resection of cavernoma. Continue to monitor and follow POC

## 2020-01-16 NOTE — PLAN OF CARE
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   Vitals: VSS, wearing CPAP overnight. CCM   Neuros: AO4, strength 5/5. Forgetful. Denies any L facial numbness. Intermittent numbness/tingling to RUE.  IV: PIV SL  Resp/trach: LS clear throughout   Diet: Regular diet, good intake  Bowel status: Last BM 1/12. Stool softeners given. Active bowel sounds.   : Voiding spontaneously   Skin: Intact   Pain: Denies   Activity: Up with assist of 1, unsteady gait.   Social: No visitors overnight, patient cooperative   Plan: Per MD note, tentative surgery planned for sub-occipital craniotomy for resection of cavernoma. Continue to monitor and follow POC

## 2020-01-16 NOTE — PLAN OF CARE
PT - Ax1 with use of gait belt, hand held assist  Discharge Planner PT   Patient plan for discharge: rehab  Current status: Pt is independent with bed mobility. Pt is SBA for sit<>stand transfers. Pt ambulates 300ft with hand held assist and contact guard assist using gait belt. Progressed to no UE support. Pt demonstrates good gait speed, but occasional LOB and path deviations. Pt participates in dynamic and static balance challenges.  Barriers to return to prior living situation: medical status, falls risk, lack of assist at home  Recommendations for discharge: ARU. Pt will likely need continued skilled therapy after surgery on 1/17  Rationale for recommendations: Pt is highly motivated and has good family support. Pt will benefit from continued skilled PT to progress activity tolerance, strength, balance, gait and independence with functional mobility and ADLs/IADLs. Pt is main caretaker for her , and does not have consistent assist available at home.  Entered by: Lila Lam 01/16/2020 2:11 PM

## 2020-01-16 NOTE — PROGRESS NOTES
"SPIRITUAL HEALTH SERVICES  SPIRITUAL ASSESSMENT Progress Note  [facility] ([place]) 6A     Follow-up visit with pt and family (, daughter, son - other son \"on his way here\"). Pt shared her feelings regarding surgery scheduled for tomorrow, her gratitude for her family's love and support, and her deep tod in God's providence and guidance. Pt welcomed prayer today, and would appreciate  visit before surgery on Friday.    PLAN: will visit Friday morning before surgery.    Jose De Jesus Fisher M.Div. ([place]), [facility]  Staff   Pager [phone]      "

## 2020-01-17 ENCOUNTER — APPOINTMENT (OUTPATIENT)
Dept: PHYSICAL THERAPY | Facility: CLINIC | Age: 70
DRG: 020 | End: 2020-01-17
Attending: NEUROLOGICAL SURGERY
Payer: MEDICARE

## 2020-01-17 LAB
GLUCOSE BLDC GLUCOMTR-MCNC: 116 MG/DL (ref 70–99)
GLUCOSE BLDC GLUCOMTR-MCNC: 160 MG/DL (ref 70–99)
GLUCOSE BLDC GLUCOMTR-MCNC: 166 MG/DL (ref 70–99)
GLUCOSE BLDC GLUCOMTR-MCNC: 219 MG/DL (ref 70–99)

## 2020-01-17 PROCEDURE — 00000146 ZZHCL STATISTIC GLUCOSE BY METER IP

## 2020-01-17 PROCEDURE — 25000131 ZZH RX MED GY IP 250 OP 636 PS 637: Mod: GY | Performed by: NURSE PRACTITIONER

## 2020-01-17 PROCEDURE — 25000132 ZZH RX MED GY IP 250 OP 250 PS 637: Mod: GY | Performed by: STUDENT IN AN ORGANIZED HEALTH CARE EDUCATION/TRAINING PROGRAM

## 2020-01-17 PROCEDURE — 25000128 H RX IP 250 OP 636: Performed by: STUDENT IN AN ORGANIZED HEALTH CARE EDUCATION/TRAINING PROGRAM

## 2020-01-17 PROCEDURE — C9113 INJ PANTOPRAZOLE SODIUM, VIA: HCPCS | Performed by: STUDENT IN AN ORGANIZED HEALTH CARE EDUCATION/TRAINING PROGRAM

## 2020-01-17 PROCEDURE — 97116 GAIT TRAINING THERAPY: CPT | Mod: GP

## 2020-01-17 PROCEDURE — 97112 NEUROMUSCULAR REEDUCATION: CPT | Mod: GP

## 2020-01-17 PROCEDURE — 25000131 ZZH RX MED GY IP 250 OP 636 PS 637: Mod: GY | Performed by: STUDENT IN AN ORGANIZED HEALTH CARE EDUCATION/TRAINING PROGRAM

## 2020-01-17 PROCEDURE — 12000001 ZZH R&B MED SURG/OB UMMC

## 2020-01-17 RX ORDER — PANTOPRAZOLE SODIUM 40 MG/1
40 TABLET, DELAYED RELEASE ORAL
Status: DISCONTINUED | OUTPATIENT
Start: 2020-01-18 | End: 2020-01-22

## 2020-01-17 RX ORDER — DEXAMETHASONE 4 MG/1
4 TABLET ORAL EVERY 8 HOURS SCHEDULED
Status: DISCONTINUED | OUTPATIENT
Start: 2020-01-17 | End: 2020-01-22

## 2020-01-17 RX ADMIN — ACETAMINOPHEN 650 MG: 325 TABLET, FILM COATED ORAL at 12:55

## 2020-01-17 RX ADMIN — LISINOPRIL 20 MG: 20 TABLET ORAL at 09:03

## 2020-01-17 RX ADMIN — INSULIN ASPART 1 UNITS: 100 INJECTION, SOLUTION INTRAVENOUS; SUBCUTANEOUS at 12:07

## 2020-01-17 RX ADMIN — DEXAMETHASONE 4 MG: 4 TABLET ORAL at 17:09

## 2020-01-17 RX ADMIN — MELATONIN 1000 UNITS: at 09:03

## 2020-01-17 RX ADMIN — Medication 0.5 MG: at 12:55

## 2020-01-17 RX ADMIN — ATORVASTATIN CALCIUM 20 MG: 20 TABLET, FILM COATED ORAL at 19:24

## 2020-01-17 RX ADMIN — Medication 0.5 MG: at 19:23

## 2020-01-17 RX ADMIN — DEXAMETHASONE SODIUM PHOSPHATE 4 MG: 4 INJECTION, SOLUTION INTRAMUSCULAR; INTRAVENOUS at 09:05

## 2020-01-17 RX ADMIN — DEXAMETHASONE 4 MG: 4 TABLET ORAL at 21:45

## 2020-01-17 RX ADMIN — PANTOPRAZOLE SODIUM 40 MG: 40 INJECTION, POWDER, FOR SOLUTION INTRAVENOUS at 09:05

## 2020-01-17 ASSESSMENT — ACTIVITIES OF DAILY LIVING (ADL)
ADLS_ACUITY_SCORE: 15
ADLS_ACUITY_SCORE: 15
ADLS_ACUITY_SCORE: 14
ADLS_ACUITY_SCORE: 15
ADLS_ACUITY_SCORE: 12
ADLS_ACUITY_SCORE: 14

## 2020-01-17 ASSESSMENT — VISUAL ACUITY
OU: NORMAL ACUITY

## 2020-01-17 NOTE — PLAN OF CARE
"Discharge Planner PT   Patient plan for discharge: Awaiting surgery 1/22  Current status: Feels a bit weaker and \"wobblier\" today 2/2 increasing hyperglycemia.  Amb unit with supervision and occasional handhold assist.  Tends to lose her balance when multi-tasking or when completing turns to the right.  Barriers to return to prior living situation: medical status/upcoming surgery, falls risk  Recommendations for discharge: TBD post-op, would recommend TCU if discharges before surgery  Rationale for recommendations:  Pt with decline in functional abilities in last month, is at increased risk of falling.  Anticipate some cognitive or attention impairments as well. Does not have enough PT goals to indicate ARU stay at this time.         Entered by: Laura Jim 01/17/2020 4:04 PM       "

## 2020-01-17 NOTE — PLAN OF CARE
Status: Pt admited w/ cerebral brainstem cavernoma.    Vitals: VSS, RA. CCM showing SB/SR.   Neuros: A&Ox4, numbness/absent sensation to L side of face. Denies any other N/T.Denies any visual disturbances. 5/5 all extremities.   IV: PIV SL.   Resp/trach: LS CTA.   Diet: Regular.   Bowel status: BS+, passing flatus.   : Voiding w/o difficulty.  Skin: No issues noted.   Pain: Denies.   Activity: Up w/ 1, GB. Unsteady gait.   Social: No visitors over shift.   Plan: Tentative plan for OR next week. Continue to monitor & follow POC.

## 2020-01-17 NOTE — PROGRESS NOTES
"S: no events overnight. Awaiting surgery    O:  Temp:  [97.2  F (36.2  C)-98.3  F (36.8  C)] 97.5  F (36.4  C)  Pulse:  [68] 68  Heart Rate:  [72-88] 72  Resp:  [16-17] 16  BP: (119-142)/(68-83) 129/78  SpO2:  [96 %-99 %] 96 %    Awake and alert.   Oriented x 4.    CN: Extraocular muscles are intact.   Left-sided facial sensation is diminished in all V1, V2 and V3 distributions. Tongue protrusion symmetric.   Shoulder shrug is symmetric.    5/5 strength in bilateral upper and lower extremities.   Finger nose finger intact   No pronator drift.   Grossly intact to light touch.     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    Dial * * [phone], enter [phone] when prompted.     "

## 2020-01-17 NOTE — PROGRESS NOTES
"SPIRITUAL HEALTH SERVICES  SPIRITUAL ASSESSMENT Progress Note  [facility] ([place]) 6A     Brief encounter with pt in [facility] - pt was walking with Physical Therapy. Pt informed me that \"I'm not having surgery today; it looks like it will be next Wednesday.\"  I let pt know I would check in on her early next week; she said she would look forward to that.    PLAN: continue to follow, will check in on pt again early next week.     Jose De Jesus Lay) Angie Fisher M.Div., [facility]  Staff   Pager [phone]      "

## 2020-01-17 NOTE — PLAN OF CARE
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 Numbness to left side of face at baseline   IV: PIV SL   Resp/trach: WDL  Diet: Regular  Bowel status: no BM   : Voiding spontaneously   Skin: WDL   Pain: no c/o pain   Activity: Up with 1, GB   Social: Family in room with pt throughout shift   Plan: Continue to follow POC

## 2020-01-17 NOTE — PLAN OF CARE
Status: Cerebral cavernoma s/p hemorrhage ,awaiting resection 1-22  Vitals: VSS  Neuros: A/O x4. Intact except states that she has numbness on the inside of her left face.  IV: PIV SL   Diet: Regular  Bowel status: no BM this shift.  : Voiding spontaneously   Pain: Tylenol for HA x 1 with good relief.  Activity: Up with 1 assist and GB in halls and to bathroom.   Social: Family here for part of shift.   Plan: Continue with cares as ordered.

## 2020-01-17 NOTE — PLAN OF CARE
SLP HOLD: Per discussion with RN, pts surgery delayed until next week. ST service to follow peripherally and re-initiate tx as appropriate pending post-operative course.

## 2020-01-17 NOTE — PLAN OF CARE
OT/6A - OT holding and will re-assess needs post op. Procedure scheduled for 1/22/2020, per chart review.

## 2020-01-18 LAB
GLUCOSE BLDC GLUCOMTR-MCNC: 125 MG/DL (ref 70–99)
GLUCOSE BLDC GLUCOMTR-MCNC: 137 MG/DL (ref 70–99)
GLUCOSE BLDC GLUCOMTR-MCNC: 138 MG/DL (ref 70–99)
GLUCOSE BLDC GLUCOMTR-MCNC: 148 MG/DL (ref 70–99)
GLUCOSE BLDC GLUCOMTR-MCNC: 164 MG/DL (ref 70–99)
GLUCOSE BLDC GLUCOMTR-MCNC: 168 MG/DL (ref 70–99)

## 2020-01-18 PROCEDURE — 25000132 ZZH RX MED GY IP 250 OP 250 PS 637: Mod: GY | Performed by: STUDENT IN AN ORGANIZED HEALTH CARE EDUCATION/TRAINING PROGRAM

## 2020-01-18 PROCEDURE — 12000001 ZZH R&B MED SURG/OB UMMC

## 2020-01-18 PROCEDURE — 25000131 ZZH RX MED GY IP 250 OP 636 PS 637: Mod: GY | Performed by: NURSE PRACTITIONER

## 2020-01-18 PROCEDURE — 25000132 ZZH RX MED GY IP 250 OP 250 PS 637: Mod: GY | Performed by: NURSE PRACTITIONER

## 2020-01-18 PROCEDURE — 00000146 ZZHCL STATISTIC GLUCOSE BY METER IP

## 2020-01-18 RX ADMIN — LISINOPRIL 20 MG: 20 TABLET ORAL at 07:34

## 2020-01-18 RX ADMIN — ATORVASTATIN CALCIUM 20 MG: 20 TABLET, FILM COATED ORAL at 20:28

## 2020-01-18 RX ADMIN — DEXAMETHASONE 4 MG: 4 TABLET ORAL at 13:40

## 2020-01-18 RX ADMIN — DEXAMETHASONE 4 MG: 4 TABLET ORAL at 06:34

## 2020-01-18 RX ADMIN — MELATONIN 1000 UNITS: at 07:35

## 2020-01-18 RX ADMIN — ACETAMINOPHEN 650 MG: 325 TABLET, FILM COATED ORAL at 12:47

## 2020-01-18 RX ADMIN — Medication 0.5 MG: at 20:28

## 2020-01-18 RX ADMIN — POLYETHYLENE GLYCOL 3350 17 G: 17 POWDER, FOR SOLUTION ORAL at 07:35

## 2020-01-18 RX ADMIN — DEXAMETHASONE 4 MG: 4 TABLET ORAL at 21:15

## 2020-01-18 RX ADMIN — PANTOPRAZOLE SODIUM 40 MG: 40 TABLET, DELAYED RELEASE ORAL at 06:34

## 2020-01-18 ASSESSMENT — ACTIVITIES OF DAILY LIVING (ADL)
ADLS_ACUITY_SCORE: 12

## 2020-01-18 ASSESSMENT — VISUAL ACUITY
OU: NORMAL ACUITY
OU: BLURRED VISION
OU: BLURRED VISION

## 2020-01-18 NOTE — PROGRESS NOTES
"S: no events overnight. Awaiting surgery    O:  Temp:  [97.2  F (36.2  C)-98.4  F (36.9  C)] 97.2  F (36.2  C)  Pulse:  [61-71] 61  Resp:  [14-19] 19  BP: (124-144)/(67-79) 132/79  SpO2:  [97 %-100 %] 99 %    Awake and alert.   Oriented x 4.    CN: Extraocular muscles are intact.   Left-sided facial sensation is diminished in all V1, V2 and V3 distributions. Tongue protrusion symmetric.   Shoulder shrug is symmetric.    5/5 strength in bilateral upper and lower extremities.   Finger nose finger intact   No pronator drift.   Grossly intact to light touch.     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    Dial * * [phone], enter [phone] when prompted.     "

## 2020-01-18 NOTE — PLAN OF CARE
Status: Cerebral cavernoma s/p hemorrhage ,awaiting resection 1-22  Vitals: VSS  Neuros: A/O x4. Intact except states that she has numbness on the inside of her left face. States that she feels weaker today.  IV: PIV SL   Diet: Regular  Bowel status: small BM this shift.  : Voiding spontaneously   Pain: Tylenol for neck and back pain x 1 with good relief.  Activity: Up with 1 assist and GB in halls and to bathroom.   Social: Family here for part of shift.   Plan: Continue with cares as ordered    Addendum: had formed BM at change of shift with blood present.She states that she bleeds with her internal hemorrhoids at home. No external hemorrhoids noted. bleeding appears to have stopped.

## 2020-01-18 NOTE — PLAN OF CARE
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 Numbness to left side of face at baseline   IV: PIV SL   Diet: Regular  Bowel status: no BM   : Voiding spontaneously   Skin: WDL   Pain: no c/o pain   Activity: Up with 1, GB   Plan: Continue to follow POC

## 2020-01-19 LAB
GLUCOSE BLDC GLUCOMTR-MCNC: 134 MG/DL (ref 70–99)
GLUCOSE BLDC GLUCOMTR-MCNC: 136 MG/DL (ref 70–99)
GLUCOSE BLDC GLUCOMTR-MCNC: 139 MG/DL (ref 70–99)
GLUCOSE BLDC GLUCOMTR-MCNC: 161 MG/DL (ref 70–99)
GLUCOSE BLDC GLUCOMTR-MCNC: 254 MG/DL (ref 70–99)

## 2020-01-19 PROCEDURE — 25000132 ZZH RX MED GY IP 250 OP 250 PS 637: Mod: GY | Performed by: NURSE PRACTITIONER

## 2020-01-19 PROCEDURE — 12000001 ZZH R&B MED SURG/OB UMMC

## 2020-01-19 PROCEDURE — 25000132 ZZH RX MED GY IP 250 OP 250 PS 637: Mod: GY | Performed by: STUDENT IN AN ORGANIZED HEALTH CARE EDUCATION/TRAINING PROGRAM

## 2020-01-19 PROCEDURE — 25000131 ZZH RX MED GY IP 250 OP 636 PS 637: Mod: GY | Performed by: NURSE PRACTITIONER

## 2020-01-19 PROCEDURE — 00000146 ZZHCL STATISTIC GLUCOSE BY METER IP

## 2020-01-19 RX ADMIN — DEXAMETHASONE 4 MG: 4 TABLET ORAL at 15:49

## 2020-01-19 RX ADMIN — PANTOPRAZOLE SODIUM 40 MG: 40 TABLET, DELAYED RELEASE ORAL at 08:23

## 2020-01-19 RX ADMIN — DEXAMETHASONE 4 MG: 4 TABLET ORAL at 08:23

## 2020-01-19 RX ADMIN — MELATONIN 1000 UNITS: at 08:23

## 2020-01-19 RX ADMIN — ATORVASTATIN CALCIUM 20 MG: 20 TABLET, FILM COATED ORAL at 20:35

## 2020-01-19 RX ADMIN — Medication 0.5 MG: at 08:27

## 2020-01-19 RX ADMIN — Medication 0.5 MG: at 20:35

## 2020-01-19 RX ADMIN — LISINOPRIL 20 MG: 20 TABLET ORAL at 08:23

## 2020-01-19 ASSESSMENT — ACTIVITIES OF DAILY LIVING (ADL)
ADLS_ACUITY_SCORE: 12

## 2020-01-19 ASSESSMENT — VISUAL ACUITY: OU: NORMAL ACUITY;GLASSES

## 2020-01-19 ASSESSMENT — MIFFLIN-ST. JEOR: SCORE: 1122.79

## 2020-01-19 NOTE — PLAN OF CARE
Status: Cerebral cavernoma s/p hemorrhage ,awaiting resection 1-22  Vitals: VSS  Neuros: A/O x4. Intact .Numbness on the inside of her left cheek.   IV: PIV SL   Diet: Regular  Bowel status: took citricel this AM.BM after lunch.  : Voiding spontaneously   Pain: Denied this shift..  Activity: Up with 1 assist and GB in halls and to bathroom.   Social: Family here for part of shift.   Plan: Continue with cares as ordered

## 2020-01-19 NOTE — PROGRESS NOTES
S: no events overnight. Awaiting surgery    O:  Temp:  [97.2  F (36.2  C)-97.9  F (36.6  C)] 97.4  F (36.3  C)  Pulse:  [60-71] 60  Heart Rate:  [60-71] 60  Resp:  [16-21] 18  BP: (132-147)/(75-91) 140/77  SpO2:  [93 %-100 %] 100 %    Awake and alert.   Oriented x 4.    CN: Extraocular muscles are intact.   Left-sided facial sensation is diminished in all V1, V2 and V3 distributions. Tongue protrusion symmetric.   Shoulder shrug is symmetric.    5/5 strength in bilateral upper and lower extremities.   Finger nose finger intact   No pronator drift.   Grossly intact to light touch.     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    Dial * * [phone], enter [phone] when prompted.

## 2020-01-19 NOTE — PLAN OF CARE
Status: Patient admitted on 1/13 with brainstem cavernoma.    Vitals: VSS, CCM with NSR  Neuros: A&Ox4, numbness of left cheek, unsteady gait  IV: PIV saline locked  Resp/trach: Lung sounds clear throughout  Diet: Regular  Bowel status: No BM, BS+, patient has hemorrhoids with small amount of bleeding overnight  : Voiding spontaneously  Skin: Intact ex generalized bruising  Pain: Denies  Activity: Up with A1 and gait belt  Social: Patient calm and cooperative with cares, slept throughout night  Plan: Plan for OR on 1/23, continue to monitor and follow POC

## 2020-01-19 NOTE — PLAN OF CARE
Vitals: VSS on RA-HTN within parameters  Neuros: Alert and oriented x4. Strengths 5/5 throughout. Numbness to inside of left face. Blurry vision-pt reports baseline since April. Neurosurgery updated.   IV: PIV SL'd  Resp/trach: WNL  Diet:Regular diet, good appetite.  Bowel status: Bs+x4. BM at change of shift with BRB. Internal hemorrhoids. No further bleeding. Neurosurgery aware.  : Voiding  Skin: Intact  Pain: Denied  Activity: Up with Ax1, GB- ambulated x2 in hallways this shift.  Social: Family at bedside this evening.  Plan: Plan for OR 1/22.

## 2020-01-20 ENCOUNTER — APPOINTMENT (OUTPATIENT)
Dept: PHYSICAL THERAPY | Facility: CLINIC | Age: 70
DRG: 020 | End: 2020-01-20
Attending: NEUROLOGICAL SURGERY
Payer: MEDICARE

## 2020-01-20 ENCOUNTER — PREP FOR PROCEDURE (OUTPATIENT)
Dept: NEUROSURGERY | Facility: CLINIC | Age: 70
End: 2020-01-20

## 2020-01-20 DIAGNOSIS — D18.00 CAVERNOMA: Primary | ICD-10-CM

## 2020-01-20 LAB
ALBUMIN UR-MCNC: NEGATIVE MG/DL
APPEARANCE UR: CLEAR
BILIRUB UR QL STRIP: NEGATIVE
COLOR UR AUTO: YELLOW
GLUCOSE BLDC GLUCOMTR-MCNC: 122 MG/DL (ref 70–99)
GLUCOSE BLDC GLUCOMTR-MCNC: 147 MG/DL (ref 70–99)
GLUCOSE BLDC GLUCOMTR-MCNC: 169 MG/DL (ref 70–99)
GLUCOSE BLDC GLUCOMTR-MCNC: 234 MG/DL (ref 70–99)
GLUCOSE UR STRIP-MCNC: NEGATIVE MG/DL
HGB UR QL STRIP: NEGATIVE
KETONES UR STRIP-MCNC: NEGATIVE MG/DL
LEUKOCYTE ESTERASE UR QL STRIP: NEGATIVE
NITRATE UR QL: NEGATIVE
PH UR STRIP: 6.5 PH (ref 5–7)
RBC #/AREA URNS AUTO: 0 /HPF (ref 0–2)
SOURCE: NORMAL
SP GR UR STRIP: 1.01 (ref 1–1.03)
SQUAMOUS #/AREA URNS AUTO: <1 /HPF (ref 0–1)
TRANS CELLS #/AREA URNS HPF: <1 /HPF (ref 0–1)
UROBILINOGEN UR STRIP-MCNC: NORMAL MG/DL (ref 0–2)
WBC #/AREA URNS AUTO: <1 /HPF (ref 0–5)

## 2020-01-20 PROCEDURE — 25000132 ZZH RX MED GY IP 250 OP 250 PS 637: Mod: GY | Performed by: STUDENT IN AN ORGANIZED HEALTH CARE EDUCATION/TRAINING PROGRAM

## 2020-01-20 PROCEDURE — 97530 THERAPEUTIC ACTIVITIES: CPT | Mod: GP

## 2020-01-20 PROCEDURE — 25000132 ZZH RX MED GY IP 250 OP 250 PS 637: Mod: GY | Performed by: NURSE PRACTITIONER

## 2020-01-20 PROCEDURE — 00000146 ZZHCL STATISTIC GLUCOSE BY METER IP

## 2020-01-20 PROCEDURE — 12000001 ZZH R&B MED SURG/OB UMMC

## 2020-01-20 PROCEDURE — 81001 URINALYSIS AUTO W/SCOPE: CPT | Performed by: STUDENT IN AN ORGANIZED HEALTH CARE EDUCATION/TRAINING PROGRAM

## 2020-01-20 PROCEDURE — 25000131 ZZH RX MED GY IP 250 OP 636 PS 637: Mod: GY | Performed by: NURSE PRACTITIONER

## 2020-01-20 PROCEDURE — 97116 GAIT TRAINING THERAPY: CPT | Mod: GP

## 2020-01-20 PROCEDURE — 87086 URINE CULTURE/COLONY COUNT: CPT | Performed by: STUDENT IN AN ORGANIZED HEALTH CARE EDUCATION/TRAINING PROGRAM

## 2020-01-20 RX ADMIN — Medication 0.5 MG: at 19:29

## 2020-01-20 RX ADMIN — DEXAMETHASONE 4 MG: 4 TABLET ORAL at 16:23

## 2020-01-20 RX ADMIN — MELATONIN 1000 UNITS: at 08:25

## 2020-01-20 RX ADMIN — ACETAMINOPHEN 650 MG: 325 TABLET, FILM COATED ORAL at 12:02

## 2020-01-20 RX ADMIN — LISINOPRIL 20 MG: 20 TABLET ORAL at 08:25

## 2020-01-20 RX ADMIN — DEXAMETHASONE 4 MG: 4 TABLET ORAL at 00:20

## 2020-01-20 RX ADMIN — DEXAMETHASONE 4 MG: 4 TABLET ORAL at 08:25

## 2020-01-20 RX ADMIN — PANTOPRAZOLE SODIUM 40 MG: 40 TABLET, DELAYED RELEASE ORAL at 08:25

## 2020-01-20 RX ADMIN — ATORVASTATIN CALCIUM 20 MG: 20 TABLET, FILM COATED ORAL at 19:28

## 2020-01-20 RX ADMIN — INSULIN ASPART 1 UNITS: 100 INJECTION, SOLUTION INTRAVENOUS; SUBCUTANEOUS at 17:21

## 2020-01-20 ASSESSMENT — ACTIVITIES OF DAILY LIVING (ADL)
ADLS_ACUITY_SCORE: 12

## 2020-01-20 NOTE — PLAN OF CARE
Discharge Planner PT   Patient plan for discharge: TBD, patient to potentially have surgery on Wed 1/22.  Current status: Patient Zeke for bed mobility. Patient close SBA for CGA for sit<>stand throughout session. Patient ambulated x>600' with 1HHA-no UE support throughout session. Patient mildly unstable during gait, but no LOBs and does better at faster gait speeds. To challenge balance during gait, patient engaged in horizontal and vertical head turns as well as side stepping, backward walking and alternating foot tapping on a step.   Barriers to return to prior living situation: Medical, potentially having surgery this week, decreased strength and activity tolerance, impaired balance, impaired functional mobility.  Recommendations for discharge: TBD based on patient functional mobility status post-op.  Rationale for recommendations: PT will update discharge recommendations regularly as patient mobility status changes.        Entered by: Lamar Cardona 01/20/2020 9:39 AM

## 2020-01-20 NOTE — PLAN OF CARE
Status: Cerebral cavernoma s/p hemorrhage ,awaiting resection 1-22  Vitals: VSS  Neuros: A/O x4. Intact .Numbness on the inside of her left cheek.   IV: PIV SL   Diet: Regular  Bowel status: Took citricel this AM.Has hx of IBS. Had 3 stools this shift, no bleeding this afternoon with BM.  : Voiding spontaneously   Pain: Tylenol for neck pain x 1 this shift.  Activity: Up with 1 assist and GB in halls and to bathroom, steadier on feet today.   Social: Family here for part of shift.   Plan: Continue with cares as ordered

## 2020-01-20 NOTE — PROGRESS NOTES
"S: no events overnight. Awaiting surgery    O:  Temp:  [97.4  F (36.3  C)-98.1  F (36.7  C)] 97.7  F (36.5  C)  Heart Rate:  [60-82] 65  Resp:  [14-26] 16  BP: (120-146)/(76-93) 127/76  SpO2:  [95 %-99 %] 99 %    Awake and alert.   Oriented x 4.    CN: Extraocular muscles are intact.   Left-sided facial sensation is diminished in all V1, V2 and V3 distributions. Tongue protrusion symmetric.   Shoulder shrug is symmetric.    5/5 strength in bilateral upper and lower extremities.   Finger nose finger intact   No pronator drift.   Sensation grossly intact to light touch.     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    Dial * * [phone], enter [phone] when prompted.     "

## 2020-01-20 NOTE — PLAN OF CARE
Status: Patient admitted on 1/13 with brainstem cavernoma.    Vitals: VSS, CCM with NSR  Neuros: A&Ox4, numbness of left cheek, unsteady gait  IV: PIV saline locked  Resp/trach: Lung sounds clear throughout  Diet: Regular  Bowel status: No BM, BS+  : Voiding spontaneously, patient c/o burning with urination, requested UA  Skin: Intact ex generalized bruising  Pain: Denies  Activity: Up with A1 and gait belt  Social: Patient calm and cooperative with cares, slept throughout night  Plan: Plan for OR on 1/23, continue to monitor and follow POC

## 2020-01-20 NOTE — PLAN OF CARE
Vitals: VSS on RA-HTN within parameters  Neuros: Alert and oriented x4. Strengths 5/5 throughout. Numbness to inside of left face.   IV: PIV SL'd  Resp/trach: WNL  Diet:Regular diet, good appetite.  Bowel status: Bs+x4. BM this shift.  : Voiding  Skin: Intact  Pain: Denied  Activity: Up with Ax1, GB- ambulated x2 in hallways this shift.  Social: Family at bedside this evening.  Plan: Plan for OR 1/22.

## 2020-01-21 ENCOUNTER — APPOINTMENT (OUTPATIENT)
Dept: PHYSICAL THERAPY | Facility: CLINIC | Age: 70
DRG: 020 | End: 2020-01-21
Attending: NEUROLOGICAL SURGERY
Payer: MEDICARE

## 2020-01-21 ENCOUNTER — ANESTHESIA EVENT (OUTPATIENT)
Dept: SURGERY | Facility: CLINIC | Age: 70
DRG: 020 | End: 2020-01-21
Payer: MEDICARE

## 2020-01-21 LAB
BACTERIA SPEC CULT: NORMAL
GLUCOSE BLDC GLUCOMTR-MCNC: 123 MG/DL (ref 70–99)
GLUCOSE BLDC GLUCOMTR-MCNC: 152 MG/DL (ref 70–99)
GLUCOSE BLDC GLUCOMTR-MCNC: 152 MG/DL (ref 70–99)
GLUCOSE BLDC GLUCOMTR-MCNC: 192 MG/DL (ref 70–99)
Lab: NORMAL
SPECIMEN SOURCE: NORMAL

## 2020-01-21 PROCEDURE — 25000132 ZZH RX MED GY IP 250 OP 250 PS 637: Mod: GY | Performed by: STUDENT IN AN ORGANIZED HEALTH CARE EDUCATION/TRAINING PROGRAM

## 2020-01-21 PROCEDURE — 25000132 ZZH RX MED GY IP 250 OP 250 PS 637: Mod: GY | Performed by: NURSE PRACTITIONER

## 2020-01-21 PROCEDURE — 40000275 ZZH STATISTIC RCP TIME EA 10 MIN

## 2020-01-21 PROCEDURE — 25000131 ZZH RX MED GY IP 250 OP 636 PS 637: Mod: GY | Performed by: NURSE PRACTITIONER

## 2020-01-21 PROCEDURE — 97116 GAIT TRAINING THERAPY: CPT | Mod: GP | Performed by: PHYSICAL THERAPIST

## 2020-01-21 PROCEDURE — 00000146 ZZHCL STATISTIC GLUCOSE BY METER IP

## 2020-01-21 PROCEDURE — 12000001 ZZH R&B MED SURG/OB UMMC

## 2020-01-21 RX ORDER — CEFAZOLIN SODIUM 2 G/100ML
2 INJECTION, SOLUTION INTRAVENOUS
Status: COMPLETED | OUTPATIENT
Start: 2020-01-21 | End: 2020-01-22

## 2020-01-21 RX ORDER — CEFAZOLIN SODIUM 1 G/3ML
1 INJECTION, POWDER, FOR SOLUTION INTRAMUSCULAR; INTRAVENOUS SEE ADMIN INSTRUCTIONS
Status: DISCONTINUED | OUTPATIENT
Start: 2020-01-21 | End: 2020-01-22 | Stop reason: HOSPADM

## 2020-01-21 RX ORDER — ACETAMINOPHEN 500 MG
1000 TABLET ORAL
Status: COMPLETED | OUTPATIENT
Start: 2020-01-21 | End: 2020-01-22

## 2020-01-21 RX ADMIN — DEXAMETHASONE 4 MG: 4 TABLET ORAL at 08:52

## 2020-01-21 RX ADMIN — PANTOPRAZOLE SODIUM 40 MG: 40 TABLET, DELAYED RELEASE ORAL at 08:52

## 2020-01-21 RX ADMIN — LISINOPRIL 20 MG: 20 TABLET ORAL at 08:52

## 2020-01-21 RX ADMIN — ATORVASTATIN CALCIUM 20 MG: 20 TABLET, FILM COATED ORAL at 19:47

## 2020-01-21 RX ADMIN — ACETAMINOPHEN 650 MG: 325 TABLET, FILM COATED ORAL at 08:51

## 2020-01-21 RX ADMIN — Medication 0.5 MG: at 19:47

## 2020-01-21 RX ADMIN — DEXAMETHASONE 4 MG: 4 TABLET ORAL at 00:09

## 2020-01-21 RX ADMIN — ACETAMINOPHEN 650 MG: 325 TABLET, FILM COATED ORAL at 19:51

## 2020-01-21 RX ADMIN — MELATONIN 1000 UNITS: at 08:51

## 2020-01-21 RX ADMIN — DEXAMETHASONE 4 MG: 4 TABLET ORAL at 15:56

## 2020-01-21 ASSESSMENT — ACTIVITIES OF DAILY LIVING (ADL)
ADLS_ACUITY_SCORE: 12

## 2020-01-21 NOTE — PROGRESS NOTES
"S: no events overnight. Awaiting surgery    O:  Temp:  [95.8  F (35.4  C)-98  F (36.7  C)] 96  F (35.6  C)  Pulse:  [71] 71  Heart Rate:  [65-74] 68  Resp:  [16-18] 16  BP: (113-136)/(64-74) 136/74  SpO2:  [96 %-100 %] 100 %    Awake and alert.   Oriented x 4.    CN: Extraocular muscles are intact.   Left-sided facial sensation is diminished in all V1, V2 and V3 distributions. Tongue protrusion symmetric.   Shoulder shrug is symmetric.    5/5 strength in bilateral upper and lower extremities.   Finger nose finger intact   No pronator drift.   Sensation grossly intact to light touch.     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    Dial * * [phone], enter [phone] when prompted.       I have reviewed the history. I have seen and examined the patient myself and agree with the assessment and plan above. Our team has discussed the details of surgery throughout the past week. I met with her and went over the surgical indications and plan. She is aware of the risks of death, stroke, permanent brainstem injury, cranial nerve injury, CSF leak, infection, incomplete resection, need for reoperation, and she agrees to proceed.  JULIAN Prabhakar MD    "

## 2020-01-21 NOTE — PLAN OF CARE
Status: Cerebral cavernoma s/p hemorrhage ,awaiting resection 1-22 or 12-23  Vitals: VSS  Neuros: A/O x4. Intact .Numbness on the inside of her left cheek. She states she is feeling more dizzy today because of the bathroom smell. They have been trying to remedy this this shift.  IV: PIV SL   Diet: Regular  Bowel status: Took citricel this AM.Having BM's no blood this AM.  : Voiding spontaneously   Pain: Tylenol for neck pain x 1 this shift.  Activity: Up with 1 assist and GB in halls and to bathroom, steadier on feet today.   Social: Family here for part of shift.   Plan: Continue with cares as ordered

## 2020-01-21 NOTE — PLAN OF CARE
Discharge Planner PT   Patient plan for discharge: TBD post-op; patient to potentially have surgery on Wed 1/22.  Current status: Pt ambulated without device with CGA, achieved 500+ ft, had no major imbalance or path deviation with head turns to provide balance challenges. Pt reported feeling off balance throughout and as though as if she is leaning and about to drift, but was able to remain steady with CGA. Pt CGA-SBA sit<>stand.  Barriers to return to prior living situation: Medical, potentially having surgery this week, impaired balance, impaired functional mobility.  Recommendations for discharge: TBD based on patient functional mobility status post-op.  Rationale for recommendations: PT will update discharge recommendations regularly as patient mobility status changes.        Entered by: Lorraine Frias 01/21/2020 5:13 PM

## 2020-01-21 NOTE — PLAN OF CARE
VSS, CPAP HS. Denied pain. Neuros intact except unchanged numbness to L cheek. PIV SL. On regular diet-no nausea. Voiding spontaneously. Loose BM x 1. Up with 1 and GB. Plan is for surgery 1/22 vs 1/23. Continue with POC.

## 2020-01-21 NOTE — PLAN OF CARE
Status: Cerebral cavernoma s/p hemorrhage ,awaiting resection  Vitals: VSS on RA  Neuros: A&Ox4, numbness tp inside of L cheek  IV: PIV SL  Resp/trach: WNL  Diet: Regular diet  Bowel status: Hx of IBS. Had 3 BMs today, no bleeding w/BMs  : Voiding spontaneously  Pain: Denies pain  Activity: A1 + GB  Social: Family visited  Plan: Continue to monitor and follow POC.

## 2020-01-22 ENCOUNTER — ANESTHESIA (OUTPATIENT)
Dept: SURGERY | Facility: CLINIC | Age: 70
DRG: 020 | End: 2020-01-22
Payer: MEDICARE

## 2020-01-22 ENCOUNTER — APPOINTMENT (OUTPATIENT)
Dept: CT IMAGING | Facility: CLINIC | Age: 70
DRG: 020 | End: 2020-01-22
Attending: STUDENT IN AN ORGANIZED HEALTH CARE EDUCATION/TRAINING PROGRAM
Payer: MEDICARE

## 2020-01-22 PROBLEM — Q27.30 AVM (ARTERIOVENOUS MALFORMATION): Status: ACTIVE | Noted: 2020-01-22

## 2020-01-22 LAB
ABO + RH BLD: NORMAL
ABO + RH BLD: NORMAL
BASE DEFICIT BLDA-SCNC: 2.3 MMOL/L
BASE DEFICIT BLDA-SCNC: 2.3 MMOL/L
BLD GP AB SCN SERPL QL: NORMAL
BLOOD BANK CMNT PATIENT-IMP: NORMAL
CA-I BLD-MCNC: 4.7 MG/DL (ref 4.4–5.2)
CA-I BLD-MCNC: 4.8 MG/DL (ref 4.4–5.2)
CREAT SERPL-MCNC: 0.71 MG/DL (ref 0.52–1.04)
GFR SERPL CREATININE-BSD FRML MDRD: 85 ML/MIN/{1.73_M2}
GLUCOSE BLD-MCNC: 179 MG/DL (ref 70–99)
GLUCOSE BLD-MCNC: 187 MG/DL (ref 70–99)
GLUCOSE BLDC GLUCOMTR-MCNC: 154 MG/DL (ref 70–99)
GLUCOSE BLDC GLUCOMTR-MCNC: 164 MG/DL (ref 70–99)
GLUCOSE BLDC GLUCOMTR-MCNC: 164 MG/DL (ref 70–99)
GLUCOSE BLDC GLUCOMTR-MCNC: 185 MG/DL (ref 70–99)
HCO3 BLD-SCNC: 23 MMOL/L (ref 21–28)
HCO3 BLD-SCNC: 23 MMOL/L (ref 21–28)
HGB BLD-MCNC: 12.9 G/DL (ref 11.7–15.7)
HGB BLD-MCNC: 13.3 G/DL (ref 11.7–15.7)
HGB BLD-MCNC: 14.9 G/DL (ref 11.7–15.7)
LACTATE BLD-SCNC: 2.5 MMOL/L (ref 0.7–2)
O2/TOTAL GAS SETTING VFR VENT: 42 %
O2/TOTAL GAS SETTING VFR VENT: 60 %
PCO2 BLD: 39 MM HG (ref 35–45)
PCO2 BLD: 42 MM HG (ref 35–45)
PH BLD: 7.35 PH (ref 7.35–7.45)
PH BLD: 7.38 PH (ref 7.35–7.45)
PLATELET # BLD AUTO: 266 10E9/L (ref 150–450)
PO2 BLD: 195 MM HG (ref 80–105)
PO2 BLD: 259 MM HG (ref 80–105)
POTASSIUM BLD-SCNC: 3.9 MMOL/L (ref 3.4–5.3)
POTASSIUM BLD-SCNC: 4 MMOL/L (ref 3.4–5.3)
SODIUM BLD-SCNC: 133 MMOL/L (ref 133–144)
SODIUM BLD-SCNC: 134 MMOL/L (ref 133–144)
SPECIMEN EXP DATE BLD: NORMAL

## 2020-01-22 PROCEDURE — 25000125 ZZHC RX 250: Performed by: NEUROLOGICAL SURGERY

## 2020-01-22 PROCEDURE — 27211024 ZZHC OR SUPPLY OTHER OPNP: Performed by: NEUROLOGICAL SURGERY

## 2020-01-22 PROCEDURE — 86900 BLOOD TYPING SEROLOGIC ABO: CPT | Performed by: ANESTHESIOLOGY

## 2020-01-22 PROCEDURE — 25000125 ZZHC RX 250: Performed by: NURSE ANESTHETIST, CERTIFIED REGISTERED

## 2020-01-22 PROCEDURE — 71000014 ZZH RECOVERY PHASE 1 LEVEL 2 FIRST HR: Performed by: NEUROLOGICAL SURGERY

## 2020-01-22 PROCEDURE — 40000014 ZZH STATISTIC ARTERIAL MONITORING DAILY

## 2020-01-22 PROCEDURE — 82947 ASSAY GLUCOSE BLOOD QUANT: CPT | Performed by: ANESTHESIOLOGY

## 2020-01-22 PROCEDURE — C1713 ANCHOR/SCREW BN/BN,TIS/BN: HCPCS | Performed by: NEUROLOGICAL SURGERY

## 2020-01-22 PROCEDURE — 88307 TISSUE EXAM BY PATHOLOGIST: CPT | Performed by: NEUROLOGICAL SURGERY

## 2020-01-22 PROCEDURE — 05BL0ZZ EXCISION OF INTRACRANIAL VEIN, OPEN APPROACH: ICD-10-PCS | Performed by: NEUROLOGICAL SURGERY

## 2020-01-22 PROCEDURE — 8E09XBG COMPUTER ASSISTED PROCEDURE OF HEAD AND NECK REGION, WITH COMPUTERIZED TOMOGRAPHY: ICD-10-PCS | Performed by: NEUROLOGICAL SURGERY

## 2020-01-22 PROCEDURE — 82803 BLOOD GASES ANY COMBINATION: CPT | Performed by: ANESTHESIOLOGY

## 2020-01-22 PROCEDURE — 84132 ASSAY OF SERUM POTASSIUM: CPT | Performed by: ANESTHESIOLOGY

## 2020-01-22 PROCEDURE — 27211180 CT HEAD W/O CONTRAST

## 2020-01-22 PROCEDURE — 82330 ASSAY OF CALCIUM: CPT | Performed by: ANESTHESIOLOGY

## 2020-01-22 PROCEDURE — 27211022 ZZHC OR IOM SUPPLIES OPNP: Performed by: NEUROLOGICAL SURGERY

## 2020-01-22 PROCEDURE — 36000076 ZZH SURGERY LEVEL 6 EA 15 ADDTL MIN - UMMC: Performed by: NEUROLOGICAL SURGERY

## 2020-01-22 PROCEDURE — 82565 ASSAY OF CREATININE: CPT | Performed by: STUDENT IN AN ORGANIZED HEALTH CARE EDUCATION/TRAINING PROGRAM

## 2020-01-22 PROCEDURE — 25000128 H RX IP 250 OP 636: Performed by: STUDENT IN AN ORGANIZED HEALTH CARE EDUCATION/TRAINING PROGRAM

## 2020-01-22 PROCEDURE — 36415 COLL VENOUS BLD VENIPUNCTURE: CPT | Performed by: ANESTHESIOLOGY

## 2020-01-22 PROCEDURE — C1762 CONN TISS, HUMAN(INC FASCIA): HCPCS | Performed by: NEUROLOGICAL SURGERY

## 2020-01-22 PROCEDURE — 25000128 H RX IP 250 OP 636: Performed by: NURSE ANESTHETIST, CERTIFIED REGISTERED

## 2020-01-22 PROCEDURE — 27210794 ZZH OR GENERAL SUPPLY STERILE: Performed by: NEUROLOGICAL SURGERY

## 2020-01-22 PROCEDURE — 71000015 ZZH RECOVERY PHASE 1 LEVEL 2 EA ADDTL HR: Performed by: NEUROLOGICAL SURGERY

## 2020-01-22 PROCEDURE — 25000128 H RX IP 250 OP 636: Performed by: ANESTHESIOLOGY

## 2020-01-22 PROCEDURE — 25000131 ZZH RX MED GY IP 250 OP 636 PS 637: Mod: GY | Performed by: NURSE PRACTITIONER

## 2020-01-22 PROCEDURE — 86901 BLOOD TYPING SEROLOGIC RH(D): CPT | Performed by: ANESTHESIOLOGY

## 2020-01-22 PROCEDURE — 84295 ASSAY OF SERUM SODIUM: CPT | Performed by: ANESTHESIOLOGY

## 2020-01-22 PROCEDURE — 25000128 H RX IP 250 OP 636: Performed by: NEUROLOGICAL SURGERY

## 2020-01-22 PROCEDURE — 40000170 ZZH STATISTIC PRE-PROCEDURE ASSESSMENT II: Performed by: NEUROLOGICAL SURGERY

## 2020-01-22 PROCEDURE — 25800030 ZZH RX IP 258 OP 636: Performed by: NURSE ANESTHETIST, CERTIFIED REGISTERED

## 2020-01-22 PROCEDURE — 20000004 ZZH R&B ICU UMMC

## 2020-01-22 PROCEDURE — 86850 RBC ANTIBODY SCREEN: CPT | Performed by: ANESTHESIOLOGY

## 2020-01-22 PROCEDURE — 00000146 ZZHCL STATISTIC GLUCOSE BY METER IP

## 2020-01-22 PROCEDURE — 83605 ASSAY OF LACTIC ACID: CPT | Performed by: ANESTHESIOLOGY

## 2020-01-22 PROCEDURE — 27810169 ZZH OR IMPLANT GENERAL: Performed by: NEUROLOGICAL SURGERY

## 2020-01-22 PROCEDURE — 37000009 ZZH ANESTHESIA TECHNICAL FEE, EACH ADDTL 15 MIN: Performed by: NEUROLOGICAL SURGERY

## 2020-01-22 PROCEDURE — 27210995 ZZH RX 272: Performed by: NEUROLOGICAL SURGERY

## 2020-01-22 PROCEDURE — 03BG0ZZ EXCISION OF INTRACRANIAL ARTERY, OPEN APPROACH: ICD-10-PCS | Performed by: NEUROLOGICAL SURGERY

## 2020-01-22 PROCEDURE — 25800030 ZZH RX IP 258 OP 636: Performed by: NEUROLOGICAL SURGERY

## 2020-01-22 PROCEDURE — 4A10X4G MONITORING OF CENTRAL NERVOUS ELECTRICAL ACTIVITY, INTRAOPERATIVE, EXTERNAL APPROACH: ICD-10-PCS | Performed by: NEUROLOGICAL SURGERY

## 2020-01-22 PROCEDURE — 25800030 ZZH RX IP 258 OP 636: Performed by: STUDENT IN AN ORGANIZED HEALTH CARE EDUCATION/TRAINING PROGRAM

## 2020-01-22 PROCEDURE — 25000132 ZZH RX MED GY IP 250 OP 250 PS 637: Mod: GY | Performed by: ANESTHESIOLOGY

## 2020-01-22 PROCEDURE — 36000074 ZZH SURGERY LEVEL 6 1ST 30 MIN - UMMC: Performed by: NEUROLOGICAL SURGERY

## 2020-01-22 PROCEDURE — 37000008 ZZH ANESTHESIA TECHNICAL FEE, 1ST 30 MIN: Performed by: NEUROLOGICAL SURGERY

## 2020-01-22 PROCEDURE — 85018 HEMOGLOBIN: CPT | Performed by: NEUROLOGICAL SURGERY

## 2020-01-22 PROCEDURE — 40000275 ZZH STATISTIC RCP TIME EA 10 MIN

## 2020-01-22 PROCEDURE — 25800030 ZZH RX IP 258 OP 636: Performed by: ANESTHESIOLOGY

## 2020-01-22 PROCEDURE — C9113 INJ PANTOPRAZOLE SODIUM, VIA: HCPCS | Performed by: STUDENT IN AN ORGANIZED HEALTH CARE EDUCATION/TRAINING PROGRAM

## 2020-01-22 PROCEDURE — 25000125 ZZHC RX 250: Performed by: STUDENT IN AN ORGANIZED HEALTH CARE EDUCATION/TRAINING PROGRAM

## 2020-01-22 PROCEDURE — 85049 AUTOMATED PLATELET COUNT: CPT | Performed by: STUDENT IN AN ORGANIZED HEALTH CARE EDUCATION/TRAINING PROGRAM

## 2020-01-22 PROCEDURE — 00U20KZ SUPPLEMENT DURA MATER WITH NONAUTOLOGOUS TISSUE SUBSTITUTE, OPEN APPROACH: ICD-10-PCS | Performed by: NEUROLOGICAL SURGERY

## 2020-01-22 DEVICE — IMP SCR SYN MATRIX LOW PRO 1.5X04MM SELF DRILL 04.503.104.01: Type: IMPLANTABLE DEVICE | Site: SKULL | Status: FUNCTIONAL

## 2020-01-22 DEVICE — IMP BUR HOLE COVER 24MM LOW PROFILE TI 421.528: Type: IMPLANTABLE DEVICE | Site: SKULL | Status: FUNCTIONAL

## 2020-01-22 DEVICE — IMP PLATE SYN STR DOG BONE LOW PROFILE 2H TI 421.502: Type: IMPLANTABLE DEVICE | Site: SKULL | Status: FUNCTIONAL

## 2020-01-22 DEVICE — GRAFT DUREPAIR DURA MATRIX 2X2" 62100: Type: IMPLANTABLE DEVICE | Site: BRAIN | Status: FUNCTIONAL

## 2020-01-22 RX ORDER — HYDROMORPHONE HYDROCHLORIDE 1 MG/ML
.3-.5 INJECTION, SOLUTION INTRAMUSCULAR; INTRAVENOUS; SUBCUTANEOUS EVERY 5 MIN PRN
Status: DISCONTINUED | OUTPATIENT
Start: 2020-01-22 | End: 2020-01-22 | Stop reason: HOSPADM

## 2020-01-22 RX ORDER — ACETAMINOPHEN 325 MG/1
975 TABLET ORAL EVERY 8 HOURS
Status: DISCONTINUED | OUTPATIENT
Start: 2020-01-22 | End: 2020-01-22

## 2020-01-22 RX ORDER — LABETALOL HYDROCHLORIDE 5 MG/ML
INJECTION, SOLUTION INTRAVENOUS PRN
Status: DISCONTINUED | OUTPATIENT
Start: 2020-01-22 | End: 2020-01-22

## 2020-01-22 RX ORDER — HYDROMORPHONE HCL/0.9% NACL/PF 0.2MG/0.2
0.2 SYRINGE (ML) INTRAVENOUS
Status: COMPLETED | OUTPATIENT
Start: 2020-01-22 | End: 2020-01-23

## 2020-01-22 RX ORDER — OXYCODONE HYDROCHLORIDE 5 MG/1
5-10 TABLET ORAL EVERY 4 HOURS PRN
Status: DISCONTINUED | OUTPATIENT
Start: 2020-01-22 | End: 2020-01-22

## 2020-01-22 RX ORDER — DEXAMETHASONE SODIUM PHOSPHATE 4 MG/ML
3 INJECTION, SOLUTION INTRA-ARTICULAR; INTRALESIONAL; INTRAMUSCULAR; INTRAVENOUS; SOFT TISSUE EVERY 8 HOURS
Status: COMPLETED | OUTPATIENT
Start: 2020-01-24 | End: 2020-01-26

## 2020-01-22 RX ORDER — SODIUM CHLORIDE 9 MG/ML
INJECTION, SOLUTION INTRAVENOUS CONTINUOUS
Status: DISCONTINUED | OUTPATIENT
Start: 2020-01-22 | End: 2020-01-23

## 2020-01-22 RX ORDER — PROPOFOL 10 MG/ML
INJECTION, EMULSION INTRAVENOUS CONTINUOUS PRN
Status: DISCONTINUED | OUTPATIENT
Start: 2020-01-22 | End: 2020-01-22

## 2020-01-22 RX ORDER — DEXAMETHASONE 2 MG/1
2 TABLET ORAL EVERY 12 HOURS SCHEDULED
Status: DISCONTINUED | OUTPATIENT
Start: 2020-01-25 | End: 2020-01-22

## 2020-01-22 RX ORDER — LIDOCAINE 40 MG/G
CREAM TOPICAL
Status: DISCONTINUED | OUTPATIENT
Start: 2020-01-22 | End: 2020-01-23

## 2020-01-22 RX ORDER — NALOXONE HYDROCHLORIDE 0.4 MG/ML
.1-.4 INJECTION, SOLUTION INTRAMUSCULAR; INTRAVENOUS; SUBCUTANEOUS
Status: DISCONTINUED | OUTPATIENT
Start: 2020-01-22 | End: 2020-01-22

## 2020-01-22 RX ORDER — DEXAMETHASONE 4 MG/1
4 TABLET ORAL EVERY 8 HOURS SCHEDULED
Status: DISCONTINUED | OUTPATIENT
Start: 2020-01-22 | End: 2020-01-22

## 2020-01-22 RX ORDER — POTASSIUM CHLORIDE 750 MG/1
20-40 TABLET, EXTENDED RELEASE ORAL
Status: DISCONTINUED | OUTPATIENT
Start: 2020-01-22 | End: 2020-01-22

## 2020-01-22 RX ORDER — PROCHLORPERAZINE 25 MG
12.5 SUPPOSITORY, RECTAL RECTAL EVERY 12 HOURS PRN
Status: DISCONTINUED | OUTPATIENT
Start: 2020-01-22 | End: 2020-01-28 | Stop reason: HOSPADM

## 2020-01-22 RX ORDER — SODIUM CHLORIDE, SODIUM LACTATE, POTASSIUM CHLORIDE, CALCIUM CHLORIDE 600; 310; 30; 20 MG/100ML; MG/100ML; MG/100ML; MG/100ML
INJECTION, SOLUTION INTRAVENOUS CONTINUOUS
Status: DISCONTINUED | OUTPATIENT
Start: 2020-01-22 | End: 2020-01-22 | Stop reason: HOSPADM

## 2020-01-22 RX ORDER — MAGNESIUM SULFATE HEPTAHYDRATE 40 MG/ML
4 INJECTION, SOLUTION INTRAVENOUS EVERY 4 HOURS PRN
Status: DISCONTINUED | OUTPATIENT
Start: 2020-01-22 | End: 2020-01-22

## 2020-01-22 RX ORDER — DEXAMETHASONE SODIUM PHOSPHATE 4 MG/ML
INJECTION, SOLUTION INTRA-ARTICULAR; INTRALESIONAL; INTRAMUSCULAR; INTRAVENOUS; SOFT TISSUE PRN
Status: DISCONTINUED | OUTPATIENT
Start: 2020-01-22 | End: 2020-01-22

## 2020-01-22 RX ORDER — ONDANSETRON 2 MG/ML
4 INJECTION INTRAMUSCULAR; INTRAVENOUS EVERY 30 MIN PRN
Status: DISCONTINUED | OUTPATIENT
Start: 2020-01-22 | End: 2020-01-22 | Stop reason: HOSPADM

## 2020-01-22 RX ORDER — ONDANSETRON 2 MG/ML
INJECTION INTRAMUSCULAR; INTRAVENOUS PRN
Status: DISCONTINUED | OUTPATIENT
Start: 2020-01-22 | End: 2020-01-22

## 2020-01-22 RX ORDER — DEXAMETHASONE SODIUM PHOSPHATE 4 MG/ML
2 INJECTION, SOLUTION INTRA-ARTICULAR; INTRALESIONAL; INTRAMUSCULAR; INTRAVENOUS; SOFT TISSUE EVERY 12 HOURS
Status: DISCONTINUED | OUTPATIENT
Start: 2020-01-27 | End: 2020-01-27

## 2020-01-22 RX ORDER — DEXAMETHASONE 1 MG
1 TABLET ORAL EVERY 12 HOURS SCHEDULED
Status: DISCONTINUED | OUTPATIENT
Start: 2020-01-27 | End: 2020-01-22

## 2020-01-22 RX ORDER — ACETAMINOPHEN 325 MG/1
650 TABLET ORAL EVERY 4 HOURS PRN
Status: DISCONTINUED | OUTPATIENT
Start: 2020-01-25 | End: 2020-01-22

## 2020-01-22 RX ORDER — ESMOLOL HYDROCHLORIDE 10 MG/ML
INJECTION INTRAVENOUS PRN
Status: DISCONTINUED | OUTPATIENT
Start: 2020-01-22 | End: 2020-01-22

## 2020-01-22 RX ORDER — DEXAMETHASONE SODIUM PHOSPHATE 4 MG/ML
4 INJECTION, SOLUTION INTRA-ARTICULAR; INTRALESIONAL; INTRAMUSCULAR; INTRAVENOUS; SOFT TISSUE EVERY 6 HOURS
Status: COMPLETED | OUTPATIENT
Start: 2020-01-22 | End: 2020-01-24

## 2020-01-22 RX ORDER — DEXAMETHASONE SODIUM PHOSPHATE 4 MG/ML
1 INJECTION, SOLUTION INTRA-ARTICULAR; INTRALESIONAL; INTRAMUSCULAR; INTRAVENOUS; SOFT TISSUE EVERY 24 HOURS
Status: DISCONTINUED | OUTPATIENT
Start: 2020-01-29 | End: 2020-01-27

## 2020-01-22 RX ORDER — SODIUM CHLORIDE, SODIUM LACTATE, POTASSIUM CHLORIDE, CALCIUM CHLORIDE 600; 310; 30; 20 MG/100ML; MG/100ML; MG/100ML; MG/100ML
INJECTION, SOLUTION INTRAVENOUS CONTINUOUS PRN
Status: DISCONTINUED | OUTPATIENT
Start: 2020-01-22 | End: 2020-01-22

## 2020-01-22 RX ORDER — ONDANSETRON 2 MG/ML
4-8 INJECTION INTRAMUSCULAR; INTRAVENOUS EVERY 6 HOURS PRN
Status: DISCONTINUED | OUTPATIENT
Start: 2020-01-22 | End: 2020-01-28 | Stop reason: HOSPADM

## 2020-01-22 RX ORDER — POTASSIUM CHLORIDE 1.5 G/1.58G
20-40 POWDER, FOR SOLUTION ORAL
Status: DISCONTINUED | OUTPATIENT
Start: 2020-01-22 | End: 2020-01-22

## 2020-01-22 RX ORDER — MANNITOL 20 G/100ML
INJECTION, SOLUTION INTRAVENOUS PRN
Status: DISCONTINUED | OUTPATIENT
Start: 2020-01-22 | End: 2020-01-22

## 2020-01-22 RX ORDER — NICOTINE POLACRILEX 4 MG
15-30 LOZENGE BUCCAL
Status: DISCONTINUED | OUTPATIENT
Start: 2020-01-22 | End: 2020-01-22

## 2020-01-22 RX ORDER — ONDANSETRON 4 MG/1
4 TABLET, ORALLY DISINTEGRATING ORAL EVERY 30 MIN PRN
Status: DISCONTINUED | OUTPATIENT
Start: 2020-01-22 | End: 2020-01-22 | Stop reason: HOSPADM

## 2020-01-22 RX ORDER — ONDANSETRON 4 MG/1
4-8 TABLET, ORALLY DISINTEGRATING ORAL EVERY 6 HOURS PRN
Status: DISCONTINUED | OUTPATIENT
Start: 2020-01-22 | End: 2020-01-28 | Stop reason: HOSPADM

## 2020-01-22 RX ORDER — DEXTROSE MONOHYDRATE 25 G/50ML
25-50 INJECTION, SOLUTION INTRAVENOUS
Status: DISCONTINUED | OUTPATIENT
Start: 2020-01-22 | End: 2020-01-22

## 2020-01-22 RX ORDER — PROCHLORPERAZINE MALEATE 5 MG
5 TABLET ORAL EVERY 6 HOURS PRN
Status: DISCONTINUED | OUTPATIENT
Start: 2020-01-22 | End: 2020-01-28 | Stop reason: HOSPADM

## 2020-01-22 RX ORDER — BUPIVACAINE HYDROCHLORIDE AND EPINEPHRINE 5; 5 MG/ML; UG/ML
INJECTION, SOLUTION PERINEURAL PRN
Status: DISCONTINUED | OUTPATIENT
Start: 2020-01-22 | End: 2020-01-22 | Stop reason: HOSPADM

## 2020-01-22 RX ORDER — POTASSIUM CHLORIDE 29.8 MG/ML
20 INJECTION INTRAVENOUS
Status: DISCONTINUED | OUTPATIENT
Start: 2020-01-22 | End: 2020-01-22

## 2020-01-22 RX ORDER — FENTANYL CITRATE 50 UG/ML
25-50 INJECTION, SOLUTION INTRAMUSCULAR; INTRAVENOUS EVERY 5 MIN PRN
Status: DISCONTINUED | OUTPATIENT
Start: 2020-01-22 | End: 2020-01-22 | Stop reason: HOSPADM

## 2020-01-22 RX ORDER — LIDOCAINE HYDROCHLORIDE 20 MG/ML
INJECTION, SOLUTION INFILTRATION; PERINEURAL PRN
Status: DISCONTINUED | OUTPATIENT
Start: 2020-01-22 | End: 2020-01-22

## 2020-01-22 RX ORDER — POTASSIUM CL/LIDO/0.9 % NACL 10MEQ/0.1L
10 INTRAVENOUS SOLUTION, PIGGYBACK (ML) INTRAVENOUS
Status: DISCONTINUED | OUTPATIENT
Start: 2020-01-22 | End: 2020-01-22

## 2020-01-22 RX ORDER — POTASSIUM CHLORIDE 7.45 MG/ML
10 INJECTION INTRAVENOUS
Status: DISCONTINUED | OUTPATIENT
Start: 2020-01-22 | End: 2020-01-22

## 2020-01-22 RX ORDER — FENTANYL CITRATE 50 UG/ML
INJECTION, SOLUTION INTRAMUSCULAR; INTRAVENOUS PRN
Status: DISCONTINUED | OUTPATIENT
Start: 2020-01-22 | End: 2020-01-22

## 2020-01-22 RX ORDER — MAGNESIUM HYDROXIDE 1200 MG/15ML
LIQUID ORAL PRN
Status: DISCONTINUED | OUTPATIENT
Start: 2020-01-22 | End: 2020-01-22 | Stop reason: HOSPADM

## 2020-01-22 RX ORDER — NALOXONE HYDROCHLORIDE 0.4 MG/ML
.1-.4 INJECTION, SOLUTION INTRAMUSCULAR; INTRAVENOUS; SUBCUTANEOUS
Status: DISCONTINUED | OUTPATIENT
Start: 2020-01-22 | End: 2020-01-28 | Stop reason: HOSPADM

## 2020-01-22 RX ORDER — PROPOFOL 10 MG/ML
INJECTION, EMULSION INTRAVENOUS PRN
Status: DISCONTINUED | OUTPATIENT
Start: 2020-01-22 | End: 2020-01-22

## 2020-01-22 RX ORDER — DEXAMETHASONE 4 MG/1
4 TABLET ORAL EVERY 12 HOURS SCHEDULED
Status: DISCONTINUED | OUTPATIENT
Start: 2020-01-23 | End: 2020-01-22

## 2020-01-22 RX ADMIN — MIDAZOLAM 1 MG: 1 INJECTION INTRAMUSCULAR; INTRAVENOUS at 07:54

## 2020-01-22 RX ADMIN — CEFAZOLIN SODIUM 1 G: 2 INJECTION, SOLUTION INTRAVENOUS at 13:12

## 2020-01-22 RX ADMIN — Medication 100 MG: at 07:54

## 2020-01-22 RX ADMIN — NICARDIPINE HYDROCHLORIDE 5 MG/HR: 0.2 INJECTION, SOLUTION INTRAVENOUS at 16:40

## 2020-01-22 RX ADMIN — ESMOLOL HYDROCHLORIDE 10 MG: 10 INJECTION, SOLUTION INTRAVENOUS at 13:35

## 2020-01-22 RX ADMIN — Medication 0.2 MG: at 23:52

## 2020-01-22 RX ADMIN — SODIUM CHLORIDE: 9 INJECTION, SOLUTION INTRAVENOUS at 18:17

## 2020-01-22 RX ADMIN — PHENYLEPHRINE HYDROCHLORIDE 50 MCG: 10 INJECTION INTRAVENOUS at 14:43

## 2020-01-22 RX ADMIN — FENTANYL CITRATE 25 MCG: 50 INJECTION, SOLUTION INTRAMUSCULAR; INTRAVENOUS at 17:11

## 2020-01-22 RX ADMIN — MIDAZOLAM 1 MG: 1 INJECTION INTRAMUSCULAR; INTRAVENOUS at 07:38

## 2020-01-22 RX ADMIN — PROPOFOL 125 MCG/KG/MIN: 10 INJECTION, EMULSION INTRAVENOUS at 10:47

## 2020-01-22 RX ADMIN — MANNITOL 155 ML: 20 INJECTION, SOLUTION INTRAVENOUS at 09:40

## 2020-01-22 RX ADMIN — PHENYLEPHRINE HYDROCHLORIDE 50 MCG: 10 INJECTION INTRAVENOUS at 13:02

## 2020-01-22 RX ADMIN — FENTANYL CITRATE 25 MCG: 50 INJECTION, SOLUTION INTRAMUSCULAR; INTRAVENOUS at 16:50

## 2020-01-22 RX ADMIN — PHENYLEPHRINE HYDROCHLORIDE 100 MCG: 10 INJECTION INTRAVENOUS at 09:54

## 2020-01-22 RX ADMIN — PHENYLEPHRINE HYDROCHLORIDE 100 MCG: 10 INJECTION INTRAVENOUS at 08:55

## 2020-01-22 RX ADMIN — PROPOFOL 40 MG: 10 INJECTION, EMULSION INTRAVENOUS at 08:30

## 2020-01-22 RX ADMIN — PHENYLEPHRINE HYDROCHLORIDE 50 MCG: 10 INJECTION INTRAVENOUS at 14:27

## 2020-01-22 RX ADMIN — ACETAMINOPHEN 1000 MG: 500 TABLET, FILM COATED ORAL at 05:02

## 2020-01-22 RX ADMIN — FENTANYL CITRATE 100 MCG: 50 INJECTION, SOLUTION INTRAMUSCULAR; INTRAVENOUS at 07:54

## 2020-01-22 RX ADMIN — PROPOFOL 20 MG: 10 INJECTION, EMULSION INTRAVENOUS at 09:10

## 2020-01-22 RX ADMIN — PHENYLEPHRINE HYDROCHLORIDE 100 MCG: 10 INJECTION INTRAVENOUS at 10:25

## 2020-01-22 RX ADMIN — SODIUM CHLORIDE, POTASSIUM CHLORIDE, SODIUM LACTATE AND CALCIUM CHLORIDE: 600; 310; 30; 20 INJECTION, SOLUTION INTRAVENOUS at 12:18

## 2020-01-22 RX ADMIN — LIDOCAINE HYDROCHLORIDE 100 MG: 20 INJECTION, SOLUTION INFILTRATION; PERINEURAL at 07:54

## 2020-01-22 RX ADMIN — PANTOPRAZOLE SODIUM 40 MG: 40 INJECTION, POWDER, FOR SOLUTION INTRAVENOUS at 21:11

## 2020-01-22 RX ADMIN — PHENYLEPHRINE HYDROCHLORIDE 100 MCG: 10 INJECTION INTRAVENOUS at 09:06

## 2020-01-22 RX ADMIN — NICARDIPINE HYDROCHLORIDE 5 MG/HR: 0.2 INJECTION, SOLUTION INTRAVENOUS at 21:32

## 2020-01-22 RX ADMIN — LABETALOL HYDROCHLORIDE 5 MG: 5 INJECTION INTRAVENOUS at 16:17

## 2020-01-22 RX ADMIN — ESMOLOL HYDROCHLORIDE 10 MG: 10 INJECTION, SOLUTION INTRAVENOUS at 13:41

## 2020-01-22 RX ADMIN — DEXAMETHASONE 4 MG: 4 TABLET ORAL at 00:34

## 2020-01-22 RX ADMIN — HYDROMORPHONE HYDROCHLORIDE 0.3 MG: 1 INJECTION, SOLUTION INTRAMUSCULAR; INTRAVENOUS; SUBCUTANEOUS at 17:16

## 2020-01-22 RX ADMIN — CEFAZOLIN SODIUM 2 G: 2 INJECTION, SOLUTION INTRAVENOUS at 09:12

## 2020-01-22 RX ADMIN — Medication 0.2 MG: at 20:41

## 2020-01-22 RX ADMIN — PROPOFOL 100 MCG/KG/MIN: 10 INJECTION, EMULSION INTRAVENOUS at 13:55

## 2020-01-22 RX ADMIN — HYDROMORPHONE HYDROCHLORIDE 0.5 MG: 1 INJECTION, SOLUTION INTRAMUSCULAR; INTRAVENOUS; SUBCUTANEOUS at 18:33

## 2020-01-22 RX ADMIN — PROPOFOL 100 MCG/KG/MIN: 10 INJECTION, EMULSION INTRAVENOUS at 11:22

## 2020-01-22 RX ADMIN — PROPOFOL 150 MCG/KG/MIN: 10 INJECTION, EMULSION INTRAVENOUS at 07:53

## 2020-01-22 RX ADMIN — REMIFENTANIL HYDROCHLORIDE 0.06 MCG/KG/MIN: 1 INJECTION, POWDER, LYOPHILIZED, FOR SOLUTION INTRAVENOUS at 07:54

## 2020-01-22 RX ADMIN — NICARDIPINE HYDROCHLORIDE 2.5 MG/HR: 0.2 INJECTION, SOLUTION INTRAVENOUS at 17:03

## 2020-01-22 RX ADMIN — ONDANSETRON 4 MG: 2 INJECTION INTRAMUSCULAR; INTRAVENOUS at 15:48

## 2020-01-22 RX ADMIN — HYDROMORPHONE HYDROCHLORIDE 0.5 MG: 1 INJECTION, SOLUTION INTRAMUSCULAR; INTRAVENOUS; SUBCUTANEOUS at 18:22

## 2020-01-22 RX ADMIN — PHENYLEPHRINE HYDROCHLORIDE 50 MCG: 10 INJECTION INTRAVENOUS at 13:49

## 2020-01-22 RX ADMIN — PHENYLEPHRINE HYDROCHLORIDE 100 MCG: 10 INJECTION INTRAVENOUS at 10:50

## 2020-01-22 RX ADMIN — FENTANYL CITRATE 25 MCG: 50 INJECTION, SOLUTION INTRAMUSCULAR; INTRAVENOUS at 15:45

## 2020-01-22 RX ADMIN — PHENYLEPHRINE HYDROCHLORIDE 100 MCG: 10 INJECTION INTRAVENOUS at 08:14

## 2020-01-22 RX ADMIN — LABETALOL HYDROCHLORIDE 10 MG: 5 INJECTION INTRAVENOUS at 16:19

## 2020-01-22 RX ADMIN — PHENYLEPHRINE HYDROCHLORIDE 50 MCG: 10 INJECTION INTRAVENOUS at 14:10

## 2020-01-22 RX ADMIN — PHENYLEPHRINE HYDROCHLORIDE 100 MCG: 10 INJECTION INTRAVENOUS at 09:25

## 2020-01-22 RX ADMIN — FENTANYL CITRATE 50 MCG: 50 INJECTION, SOLUTION INTRAMUSCULAR; INTRAVENOUS at 18:55

## 2020-01-22 RX ADMIN — CEFAZOLIN SODIUM 1 G: 2 INJECTION, SOLUTION INTRAVENOUS at 15:08

## 2020-01-22 RX ADMIN — PROPOFOL 100 MG: 10 INJECTION, EMULSION INTRAVENOUS at 07:53

## 2020-01-22 RX ADMIN — LABETALOL HYDROCHLORIDE 10 MG: 5 INJECTION INTRAVENOUS at 16:34

## 2020-01-22 RX ADMIN — SODIUM CHLORIDE, POTASSIUM CHLORIDE, SODIUM LACTATE AND CALCIUM CHLORIDE: 600; 310; 30; 20 INJECTION, SOLUTION INTRAVENOUS at 09:00

## 2020-01-22 RX ADMIN — PHENYLEPHRINE HYDROCHLORIDE 50 MCG: 10 INJECTION INTRAVENOUS at 13:57

## 2020-01-22 RX ADMIN — PHENYLEPHRINE HYDROCHLORIDE 50 MCG: 10 INJECTION INTRAVENOUS at 14:57

## 2020-01-22 RX ADMIN — DEXAMETHASONE SODIUM PHOSPHATE 10 MG: 4 INJECTION, SOLUTION INTRA-ARTICULAR; INTRALESIONAL; INTRAMUSCULAR; INTRAVENOUS; SOFT TISSUE at 08:23

## 2020-01-22 RX ADMIN — FENTANYL CITRATE 50 MCG: 50 INJECTION, SOLUTION INTRAMUSCULAR; INTRAVENOUS at 17:00

## 2020-01-22 RX ADMIN — PHENYLEPHRINE HYDROCHLORIDE 100 MCG: 10 INJECTION INTRAVENOUS at 08:44

## 2020-01-22 RX ADMIN — HYDROMORPHONE HYDROCHLORIDE 0.5 MG: 1 INJECTION, SOLUTION INTRAMUSCULAR; INTRAVENOUS; SUBCUTANEOUS at 18:03

## 2020-01-22 RX ADMIN — SODIUM CHLORIDE, POTASSIUM CHLORIDE, SODIUM LACTATE AND CALCIUM CHLORIDE: 600; 310; 30; 20 INJECTION, SOLUTION INTRAVENOUS at 07:38

## 2020-01-22 RX ADMIN — PHENYLEPHRINE HYDROCHLORIDE 100 MCG: 10 INJECTION INTRAVENOUS at 10:11

## 2020-01-22 RX ADMIN — FENTANYL CITRATE 25 MCG: 50 INJECTION, SOLUTION INTRAMUSCULAR; INTRAVENOUS at 15:46

## 2020-01-22 RX ADMIN — PROPOFOL 30 MG: 10 INJECTION, EMULSION INTRAVENOUS at 08:10

## 2020-01-22 RX ADMIN — CEFAZOLIN SODIUM 1 G: 2 INJECTION, SOLUTION INTRAVENOUS at 11:12

## 2020-01-22 RX ADMIN — DEXAMETHASONE SODIUM PHOSPHATE 4 MG: 4 INJECTION, SOLUTION INTRAMUSCULAR; INTRAVENOUS at 21:07

## 2020-01-22 RX ADMIN — HYDROMORPHONE HYDROCHLORIDE 0.2 MG: 1 INJECTION, SOLUTION INTRAMUSCULAR; INTRAVENOUS; SUBCUTANEOUS at 17:34

## 2020-01-22 ASSESSMENT — ACTIVITIES OF DAILY LIVING (ADL)
ADLS_ACUITY_SCORE: 11
ADLS_ACUITY_SCORE: 12
ADLS_ACUITY_SCORE: 11

## 2020-01-22 NOTE — PLAN OF CARE
VSS, CPAP HS. Denied pain. Neuros intact except unchanged numbness to L cheek. PIV SL. NPO since MN.  Voiding spontaneously. Having small loose BM's with every void. Up with 1 and GB, dizzy with ambulation. Pre-op scrub and checklist completed.  Report given to 3C Ashok MULLEN.  Pt sent to head CT and then to 3C.       .

## 2020-01-22 NOTE — PROGRESS NOTES
"S: ready for surgery     O:  Temp:  [96.9  F (36.1  C)-97.3  F (36.3  C)] 97.3  F (36.3  C)  Heart Rate:  [63-72] 63  Resp:  [16] 16  BP: (120-139)/(65-73) 139/73  SpO2:  [97 %-100 %] 97 %    Awake and alert.   Oriented x 4.    CN: Extraocular muscles are intact.   Left-sided facial sensation is diminished in all V1, V2 and V3 distributions. Tongue protrusion symmetric.   Shoulder shrug is symmetric.    5/5 strength in bilateral upper and lower extremities.   Finger nose finger intact   No pronator drift.   Sensation grossly intact to light touch.     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    Dial * * [phone], enter [phone] when prompted.     I have reviewed the history. I have seen and examined the patient myself and agree with the assessment and plan above.  JULIAN Prabhakar MD    "

## 2020-01-22 NOTE — BRIEF OP NOTE
[facility], [place]    Brief Operative Note    Pre-operative diagnosis: Cavernoma [D18.00]  Post-operative diagnosis Same as pre-operative diagnosis    Procedure: Procedure(s):  STEALTH GUIDED SUBOCCIPITAL CRANIOTOMY FOR RESECTION OF CAVERNOUS MALFORMATION WITH NEUROMONITORING, POSSIBLE LUMBAR DRAIN, POSSIBLE EXTERNAL VENTRICULAR DRAIN  Surgeon: Surgeon(s) and Role:     * Catarina Prabhakar MD - Primary     * Kemi Gage MD - Resident - Assisting  Anesthesia: General   Estimated blood loss: Less than 50 ml  Drains: None  Specimens:   ID Type Source Tests Collected by Time Destination   A : Brain Stem Lesion Tissue Brain SURGICAL PATHOLOGY EXAM Catarina Prabhakar MD 1/22/2020  1:27 PM      Findings:   None.  Complications: None.  Implants:   Implant Name Type Inv. Item Serial No.  Lot No. LRB No. Used   GRAFT DUREPAIR DURA MATRIX 2X2&quot; [id number] Bone/Tissue/Biologic GRAFT DUREPAIR DURA MATRIX 2X2&quot; [id number] [employer]-NEURO [id number] N/A 1   IMP SCR SYN MATRIX LOW PRO 1.5X04MM SELF DRILL [id number] [employer] IMP SCR SYN MATRIX LOW PRO 1.5X04MM SELF DRILL [id number] NONE [employer] NONE N/A 8   IMP PLATE SYN STR DOG BONE LOW PROFILE 2H .502 [employer] IMP PLATE SYN STR DOG BONE LOW PROFILE 2H .502 NONE [employer] NONE N/A 2   IMP BUR HOLE COVER 24MM LOW PROFILE .528 [employer] IMP BUR HOLE COVER 24MM LOW PROFILE .528 NONE [employer] NONE N/A 1

## 2020-01-22 NOTE — PROGRESS NOTES
"SPIRITUAL HEALTH SERVICES  SPIRITUAL ASSESSMENT Progress Note  [facility] ([place]) 6A     Per charge nurse, pt on schedule for first case surgery on Wednesday. Pt said she hadn't heard yet from surgeon regarding potential for surgery on Wednesday. I let pt know I would be gone Wednesday, but  support always available on-call. Pt welcomed prayer today in anticipation of potential surgery, and she shared a song with me that one of her pastors had shared with her. Pt shared regarding her tod that God is with her and is her source of hope for healing. She said she feels secure in God's embrace \"whether I survive or whether I go to be with God.\"     PLAN:  support available on-call on Wednesday. I will follow-up with pt on Thursday.    Jose De Jesus Lay) Angie Fisher M.Div., [facility]  Staff   Pager [phone]      "

## 2020-01-22 NOTE — ANESTHESIA CARE TRANSFER NOTE
Patient: Sheila Barraza    Procedure(s):  STEALTH GUIDED SUBOCCIPITAL CRANIOTOMY FOR RESECTION OF CAVERNOUS MALFORMATION WITH NEUROMONITORING    Diagnosis: Cavernoma [D18.00]  Diagnosis Additional Information: No value filed.    Anesthesia Type:   General     Note:  Airway :Face Mask  Patient transferred to:PACU  Comments: Patient arousable and following commands. Face mask in place. Cardene drip initiated to keep SBP <140.Report to RN. Handoff Report: Identifed the Patient, Identified the Reponsible Provider, Reviewed the pertinent medical history, Discussed the surgical course, Reviewed Intra-OP anesthesia mangement and issues during anesthesia, Set expectations for post-procedure period and Allowed opportunity for questions and acknowledgement of understanding      Vitals: (Last set prior to Anesthesia Care Transfer)    CRNA VITALS  1/22/2020 1601 - 1/22/2020 1648      1/22/2020             Pulse:  76    ART BP:  140/54    SpO2:  97 %                Electronically Signed By: ANABELLE Hernandez CRNA  January 22, 2020  4:48 PM

## 2020-01-22 NOTE — ANESTHESIA PROCEDURE NOTES
Arterial Line Procedure Note  Staff:     Anesthesiologist:  Jack Rodríguez MD  Location: In OR After Induction  Procedure Start/Stop Times:     patient identified, IV checked, site marked, risks and benefits discussed, informed consent, monitors and equipment checked, pre-op evaluation and at physician/surgeon's request      Correct Patient: Yes      Correct Position: Yes      Correct Site: Yes      Correct Procedure: Yes      Correct Laterality:  Yes    Site Marked:  Yes  Line Placement:     Procedure:  Arterial Line    Insertion Site:  Dorsalis Pedis    Insertion laterality:  Right    Skin Prep: Chloraprep      Patient Prep: patient draped, mask, sterile gloves, hat and hand hygiene      Local skin infiltration:  None    Ultrasound Guided?: Yes      Artery evaluated via ultrasound confirming patency.   Using realtime imaging, the artery was punctured and the needle was observed entering the artery.      A permanent image is NOT entered into the patient's record.      Catheter size:  20 gauge, Quick cath    Cath secured with: suture      Dressing:  Tegaderm    Complications:  None obvious    Arterial waveform: Yes      IBP within 10% of NIBP: Yes

## 2020-01-22 NOTE — PROGRESS NOTES
"CLINICAL NUTRITION SERVICES - ASSESSMENT NOTE     Nutrition Prescription    RECOMMENDATIONS FOR MDs/PROVIDERS TO ORDER:  Recommend ordering BMP, Mg and PO4 for review    Malnutrition Status:    Unable to determine at this time    Recommendations already ordered by Registered Dietitian (RD):  None at this time    Future/Additional Recommendations:  Monitor for diet adv post op, po progress and need for oral nutritional supplements to ensure adequate intakes for postop healing and recovery      REASON FOR ASSESSMENT  Sheila Barraza is a/an 69 year old female assessed by the dietitian for LOS    CURRENT NUTRITION ORDERS  Diet: NPO since MN. Previously on Regular diet as of 1/17  Intake/Tolerance: Per RN/flowsheets, pt with good po intakes when allowed to eat.  - SLP consult for swallow study on 1/14. Recommendation made to initiate regular textures and thin liquids. Further recommendation made for obtaining video swallow study to objectively assess aspiration risk, in the setting of pts brainstem cavernoma and new onset dysphagia.    LABS  Last BMP obtained on 1/14.     MEDICATIONS  Medications reviewed    ANTHROPOMETRICS  Height: 165.1 cm (5' 5\")  Most Recent Weight: 60.7 kg (133 lb 12.8 oz) - 1/19 (last recorded wt), Admit wt = 62.2 kg (137 lbs) on 1/13.   IBW: 57 kg  BMI: Normal BMI (based on last available wt on 1/19)  Weight History: Wt loss of 1.5 kg (2.4% loss) x 6 days. However, no new wt x past 3 days.     Dosing Weight: 61 kg (based on last available of 60.7 kg on 1/13)    ASSESSED NUTRITION NEEDS  Estimated Energy Needs: 4481-3357 kcals/day (25 - 30 kcals/kg)  Justification: Maintenance  Estimated Protein Needs: 75-90 grams protein/day (1.2 - 1.5 grams of pro/kg)  Justification: Post-op  Estimated Fluid Needs: (1 mL/kcal)   Justification: Maintenance    PHYSICAL FINDINGS  See malnutrition section below.  Unable to assess at this time secondary to pt in OR  Numbness to inside of left face reported per chart " review    MALNUTRITION  % Intake: Unable to assess  % Weight Loss: > 2% in 1 week (severe)  Subcutaneous Fat Loss: Unable to assess  Muscle Loss: Unable to assess  Fluid Accumulation/Edema: Unable to assess  Malnutrition Diagnosis: Unable to determine due to unable to obtain diet hx and unable to complete nutritional physical assessment    NUTRITION DIAGNOSIS  Predicted inadequate nutrient intake (protein-calorie) related to previously tolerating a Regular diet with good intakes, but now NPO for surgery and wt had been trending downward, however, no new wt obtained x past 3 days.     INTERVENTIONS  Implementation  Nutrition Education: No education needs assessed at this time     Goals  1. Diet adv within 24 to 48 hrs.  2. Patient to consume % of nutritionally adequate meal trays TID, or the equivalent with supplements/snacks.     Monitoring/Evaluation  Progress toward goals will be monitored and evaluated per protocol.  Teresita Loyd RD,LD  Unit pager [phone]

## 2020-01-22 NOTE — ANESTHESIA PREPROCEDURE EVALUATION
Anesthesia Pre-Procedure Evaluation    Patient: Sheila Barraza   MRN:     [id number] Gender:   female   Age:    69 year old :      1950        Preoperative Diagnosis: Cavernoma [D18.00]   Procedure(s):  STEALTH GUIDED SUBOCCIPITAL CRANIOTOMY FOR RESECTION OF CAVERNOUS MALFORMATION WITH NEUROMONITORING, POSSIBLE LUMBAR DRAIN, POSSIBLE EXTERNAL VENTRICULAR DRAIN     Past Medical History:   Diagnosis Date     Dyslipidemia      Hypertension       Past Surgical History:   Procedure Laterality Date     HERNIA REPAIR            Anesthesia Evaluation     .             ROS/MED HX    ENT/Pulmonary:     (+)sleep apnea, uses CPAP , . .    Neurologic: Comment: Brainstem cavernous malformation s/p ICH   R trigeminal neuralgia       Cardiovascular:     (+) Dyslipidemia, hypertension----. : . . . :. .       METS/Exercise Tolerance:     Hematologic:  - neg hematologic  ROS       Musculoskeletal:  - neg musculoskeletal ROS       GI/Hepatic:  - neg GI/hepatic ROS       Renal/Genitourinary:         Endo:  - neg endo ROS       Psychiatric:         Infectious Disease:  - neg infectious disease ROS       Malignancy:         Other:                         PHYSICAL EXAM:   Mental Status/Neuro: A/A/O   Airway: Facies: Feasible  Mallampati: II  Mouth/Opening: Full  TM distance: > 6 cm  Neck ROM: Full   Respiratory: Auscultation: CTAB     Resp. Rate: Normal     Resp. Effort: Normal      CV: Rhythm: Regular  Heart: Normal Sounds  Edema: None   Comments:      Dental: Details                  LABS:  CBC:   Lab Results   Component Value Date    WBC 6.4 2020    WBC 6.4 2020    HGB 12.8 2020    HGB 13.2 2020    HCT 36.0 2020    HCT 38.1 2020     2020     2020     BMP:   Lab Results   Component Value Date     2020     2020    POTASSIUM 3.6 2020    POTASSIUM 3.9 2020    CHLORIDE 109 2020    CHLORIDE 108 2020    CO2 26 2020     "CO2 26 01/14/2020    BUN 12 01/14/2020    BUN 12 01/14/2020    CR 0.78 01/14/2020    CR 0.85 01/14/2020    GLC 97 01/14/2020     (H) 01/14/2020     COAGS:   Lab Results   Component Value Date    PTT 32 01/14/2020    INR 1.00 01/14/2020    FIBR 478 (H) 01/14/2020     POC:   Lab Results   Component Value Date     (H) 01/21/2020     OTHER:   Lab Results   Component Value Date    A1C 6.0 (H) 01/15/2020    MICHAEL 9.2 01/14/2020    PHOS 3.7 01/14/2020    MAG 1.9 01/14/2020    ALBUMIN 3.3 (L) 01/14/2020    PROTTOTAL 6.6 (L) 01/14/2020    ALT 25 01/14/2020    AST 12 01/14/2020    ALKPHOS 60 01/14/2020    BILITOTAL 0.4 01/14/2020        Preop Vitals    BP Readings from Last 3 Encounters:   01/21/20 128/65    Pulse Readings from Last 3 Encounters:   01/20/20 71      Resp Readings from Last 3 Encounters:   01/21/20 16    SpO2 Readings from Last 3 Encounters:   01/21/20 97%      Temp Readings from Last 1 Encounters:   01/21/20 36.1  C (96.9  F) (Oral)    Ht Readings from Last 1 Encounters:   01/13/20 1.651 m (5' 5\")      Wt Readings from Last 1 Encounters:   01/19/20 60.7 kg (133 lb 12.8 oz)    Estimated body mass index is 22.27 kg/m  as calculated from the following:    Height as of this encounter: 1.651 m (5' 5\").    Weight as of this encounter: 60.7 kg (133 lb 12.8 oz).     LDA:  Peripheral IV 01/14/20 Left;Anterior Lower forearm (Active)   Site Assessment WDL 1/21/2020  4:00 PM   Line Status Saline locked 1/21/2020  4:00 PM   Phlebitis Scale 0-->no symptoms 1/21/2020  4:00 PM   Infiltration Scale 0 1/21/2020  4:00 PM   Infiltration Site Treatment Method  None 1/21/2020  4:00 PM   Extravasation? No 1/21/2020  4:00 PM   Number of days: 7        Assessment:   ASA SCORE: 3    H&P: History and physical reviewed and following examination; no interval change.   Smoking Status:  Non-Smoker/Unknown        Plan:   Anes. Type:  General   Pre-Medication: None   Induction:  IV (Standard)   Airway: ETT; Oral "   Access/Monitoring: PIV; A-Line; 2nd PIV   Maintenance: TIVA     Postop Plan:   Postop Pain: Opioids  Postop Sedation/Airway: Not planned  Disposition: ICU     PONV Management:   Adult Risk Factors: Female, Non-Smoker, Postop Opioids   Prevention: Ondansetron, Propofol, No Volatiles     CONSENT: Direct conversation   Plan and risks discussed with: Patient   Blood Products: Consented (ALL Blood Products)       Comments for Plan/Consent:  Patient with ICH for crani. Will plan for GETA with NIMS tube with bite blocks, TIVA for monitoring, A-line                 Jack Rodríguez MD

## 2020-01-22 NOTE — ANESTHESIA PROCEDURE NOTES
Arterial Line Procedure Note  Staff:     Anesthesiologist:  Jack Rodríguez MD  Location: In OR After Induction  Procedure Start/Stop Times:     patient identified, IV checked, site marked, risks and benefits discussed, informed consent, monitors and equipment checked, pre-op evaluation and at physician/surgeon's request      Correct Patient: Yes      Correct Position: Yes      Correct Site: Yes      Correct Procedure: Yes      Correct Laterality:  Yes    Site Marked:  Yes  Line Placement:     Procedure:  Arterial Line    Insertion Site:  Radial    Insertion laterality:  Left    Skin Prep: Chloraprep      Patient Prep: patient draped, mask, sterile gloves, hat and hand hygiene      Local skin infiltration:  None    Ultrasound Guided?: Yes      Artery evaluated via ultrasound confirming patency.   Using realtime imaging, the artery was punctured and the needle was observed entering the artery.      A permanent image is NOT entered into the patient's record.      Catheter size:  20 gauge, Quick cath    Cath secured with: suture      Dressing:  Tegaderm    Complications:  None obvious    Arterial waveform: Yes      IBP within 10% of NIBP: Yes

## 2020-01-22 NOTE — PLAN OF CARE
Status: Cerebral cavernoma s/p hemorrhage ,awaiting resection tomorrow.  Vitals: VSS on RA  Neuros: Unchanged, A&Ox4, numbness to inside of L cheek. Occasional int. Numbness to R fingertips. Felt dizzy and had a HA today d/t smell in bathroom.  IV: PIV SL  Resp/trach: WNL  Diet: NPO, surgery tomorrow morning.  Bowel status: Hx of IBS.   : Voiding spontaneously to BR  Pain: Tylenol x1 for neck pain  Activity: A1 + GB  Social: Family visited this uft.  Plan: Surgery tomorrow morning at 0600. Please call CT at 5am to get fiducial's placed prior.

## 2020-01-23 ENCOUNTER — APPOINTMENT (OUTPATIENT)
Dept: PHYSICAL THERAPY | Facility: CLINIC | Age: 70
DRG: 020 | End: 2020-01-23
Attending: STUDENT IN AN ORGANIZED HEALTH CARE EDUCATION/TRAINING PROGRAM
Payer: MEDICARE

## 2020-01-23 ENCOUNTER — APPOINTMENT (OUTPATIENT)
Dept: SPEECH THERAPY | Facility: CLINIC | Age: 70
DRG: 020 | End: 2020-01-23
Attending: NEUROLOGICAL SURGERY
Payer: MEDICARE

## 2020-01-23 ENCOUNTER — APPOINTMENT (OUTPATIENT)
Dept: OCCUPATIONAL THERAPY | Facility: CLINIC | Age: 70
DRG: 020 | End: 2020-01-23
Attending: STUDENT IN AN ORGANIZED HEALTH CARE EDUCATION/TRAINING PROGRAM
Payer: MEDICARE

## 2020-01-23 ENCOUNTER — APPOINTMENT (OUTPATIENT)
Dept: CT IMAGING | Facility: CLINIC | Age: 70
DRG: 020 | End: 2020-01-23
Attending: STUDENT IN AN ORGANIZED HEALTH CARE EDUCATION/TRAINING PROGRAM
Payer: MEDICARE

## 2020-01-23 ENCOUNTER — APPOINTMENT (OUTPATIENT)
Dept: CT IMAGING | Facility: CLINIC | Age: 70
DRG: 020 | End: 2020-01-23
Attending: NURSE PRACTITIONER
Payer: MEDICARE

## 2020-01-23 LAB
ANION GAP SERPL CALCULATED.3IONS-SCNC: 8 MMOL/L (ref 3–14)
BUN SERPL-MCNC: 22 MG/DL (ref 7–30)
CALCIUM SERPL-MCNC: 8 MG/DL (ref 8.5–10.1)
CHLORIDE SERPL-SCNC: 107 MMOL/L (ref 94–109)
CO2 SERPL-SCNC: 22 MMOL/L (ref 20–32)
CREAT SERPL-MCNC: 0.63 MG/DL (ref 0.52–1.04)
ERYTHROCYTE [DISTWIDTH] IN BLOOD BY AUTOMATED COUNT: 11.8 % (ref 10–15)
GFR SERPL CREATININE-BSD FRML MDRD: 90 ML/MIN/{1.73_M2}
GLUCOSE BLDC GLUCOMTR-MCNC: 165 MG/DL (ref 70–99)
GLUCOSE BLDC GLUCOMTR-MCNC: 170 MG/DL (ref 70–99)
GLUCOSE BLDC GLUCOMTR-MCNC: 176 MG/DL (ref 70–99)
GLUCOSE BLDC GLUCOMTR-MCNC: 206 MG/DL (ref 70–99)
GLUCOSE SERPL-MCNC: 176 MG/DL (ref 70–99)
HCT VFR BLD AUTO: 35.5 % (ref 35–47)
HGB BLD-MCNC: 12.9 G/DL (ref 11.7–15.7)
MAGNESIUM SERPL-MCNC: 1.8 MG/DL (ref 1.6–2.3)
MCH RBC QN AUTO: 33.2 PG (ref 26.5–33)
MCHC RBC AUTO-ENTMCNC: 36.3 G/DL (ref 31.5–36.5)
MCV RBC AUTO: 92 FL (ref 78–100)
PHOSPHATE SERPL-MCNC: 2.5 MG/DL (ref 2.5–4.5)
PLATELET # BLD AUTO: 185 10E9/L (ref 150–450)
POTASSIUM SERPL-SCNC: 3.8 MMOL/L (ref 3.4–5.3)
RBC # BLD AUTO: 3.88 10E12/L (ref 3.8–5.2)
SODIUM SERPL-SCNC: 137 MMOL/L (ref 133–144)
WBC # BLD AUTO: 26.5 10E9/L (ref 4–11)

## 2020-01-23 PROCEDURE — 25000132 ZZH RX MED GY IP 250 OP 250 PS 637: Mod: GY | Performed by: STUDENT IN AN ORGANIZED HEALTH CARE EDUCATION/TRAINING PROGRAM

## 2020-01-23 PROCEDURE — 00000146 ZZHCL STATISTIC GLUCOSE BY METER IP

## 2020-01-23 PROCEDURE — 84100 ASSAY OF PHOSPHORUS: CPT | Performed by: STUDENT IN AN ORGANIZED HEALTH CARE EDUCATION/TRAINING PROGRAM

## 2020-01-23 PROCEDURE — C9113 INJ PANTOPRAZOLE SODIUM, VIA: HCPCS | Performed by: STUDENT IN AN ORGANIZED HEALTH CARE EDUCATION/TRAINING PROGRAM

## 2020-01-23 PROCEDURE — 25800030 ZZH RX IP 258 OP 636: Performed by: STUDENT IN AN ORGANIZED HEALTH CARE EDUCATION/TRAINING PROGRAM

## 2020-01-23 PROCEDURE — 25000132 ZZH RX MED GY IP 250 OP 250 PS 637: Mod: GY | Performed by: NURSE PRACTITIONER

## 2020-01-23 PROCEDURE — 25000128 H RX IP 250 OP 636: Performed by: STUDENT IN AN ORGANIZED HEALTH CARE EDUCATION/TRAINING PROGRAM

## 2020-01-23 PROCEDURE — 97162 PT EVAL MOD COMPLEX 30 MIN: CPT | Mod: GP

## 2020-01-23 PROCEDURE — 25000125 ZZHC RX 250: Performed by: STUDENT IN AN ORGANIZED HEALTH CARE EDUCATION/TRAINING PROGRAM

## 2020-01-23 PROCEDURE — 80048 BASIC METABOLIC PNL TOTAL CA: CPT | Performed by: STUDENT IN AN ORGANIZED HEALTH CARE EDUCATION/TRAINING PROGRAM

## 2020-01-23 PROCEDURE — 20000004 ZZH R&B ICU UMMC

## 2020-01-23 PROCEDURE — 97530 THERAPEUTIC ACTIVITIES: CPT | Mod: GP

## 2020-01-23 PROCEDURE — 83735 ASSAY OF MAGNESIUM: CPT | Performed by: STUDENT IN AN ORGANIZED HEALTH CARE EDUCATION/TRAINING PROGRAM

## 2020-01-23 PROCEDURE — 92610 EVALUATE SWALLOWING FUNCTION: CPT | Mod: GN

## 2020-01-23 PROCEDURE — 70450 CT HEAD/BRAIN W/O DYE: CPT | Mod: 77

## 2020-01-23 PROCEDURE — 72125 CT NECK SPINE W/O DYE: CPT

## 2020-01-23 PROCEDURE — 85027 COMPLETE CBC AUTOMATED: CPT | Performed by: STUDENT IN AN ORGANIZED HEALTH CARE EDUCATION/TRAINING PROGRAM

## 2020-01-23 PROCEDURE — 97166 OT EVAL MOD COMPLEX 45 MIN: CPT | Mod: GO | Performed by: OCCUPATIONAL THERAPIST

## 2020-01-23 PROCEDURE — 97535 SELF CARE MNGMENT TRAINING: CPT | Mod: GO | Performed by: OCCUPATIONAL THERAPIST

## 2020-01-23 PROCEDURE — 92526 ORAL FUNCTION THERAPY: CPT | Mod: GN

## 2020-01-23 PROCEDURE — 97116 GAIT TRAINING THERAPY: CPT | Mod: GP

## 2020-01-23 PROCEDURE — 97530 THERAPEUTIC ACTIVITIES: CPT | Mod: GO | Performed by: OCCUPATIONAL THERAPIST

## 2020-01-23 PROCEDURE — 70450 CT HEAD/BRAIN W/O DYE: CPT

## 2020-01-23 RX ORDER — LISINOPRIL 10 MG/1
10 TABLET ORAL DAILY
Status: DISCONTINUED | OUTPATIENT
Start: 2020-01-23 | End: 2020-01-25

## 2020-01-23 RX ORDER — ATORVASTATIN CALCIUM 10 MG/1
10 TABLET, FILM COATED ORAL DAILY
Status: DISCONTINUED | OUTPATIENT
Start: 2020-01-24 | End: 2020-01-23

## 2020-01-23 RX ORDER — LISINOPRIL 10 MG/1
10 TABLET ORAL DAILY
Status: DISCONTINUED | OUTPATIENT
Start: 2020-01-24 | End: 2020-01-23

## 2020-01-23 RX ADMIN — Medication 10 MEQ: at 07:08

## 2020-01-23 RX ADMIN — Medication 2 G: at 06:05

## 2020-01-23 RX ADMIN — ACETAMINOPHEN 650 MG: 325 TABLET, FILM COATED ORAL at 16:34

## 2020-01-23 RX ADMIN — METHYLCELLULOSE 500 MG: 500 TABLET ORAL at 12:46

## 2020-01-23 RX ADMIN — ACETAMINOPHEN 650 MG: 325 TABLET, FILM COATED ORAL at 20:15

## 2020-01-23 RX ADMIN — PANTOPRAZOLE SODIUM 40 MG: 40 INJECTION, POWDER, FOR SOLUTION INTRAVENOUS at 07:57

## 2020-01-23 RX ADMIN — MELATONIN 1000 UNITS: at 08:03

## 2020-01-23 RX ADMIN — ATORVASTATIN CALCIUM 20 MG: 20 TABLET, FILM COATED ORAL at 20:14

## 2020-01-23 RX ADMIN — SODIUM PHOSPHATE, MONOBASIC, MONOHYDRATE AND SODIUM PHOSPHATE, DIBASIC, ANHYDROUS 10 MMOL: 276; 142 INJECTION, SOLUTION INTRAVENOUS at 10:03

## 2020-01-23 RX ADMIN — LISINOPRIL 10 MG: 10 TABLET ORAL at 22:06

## 2020-01-23 RX ADMIN — LABETALOL 20 MG/4 ML (5 MG/ML) INTRAVENOUS SYRINGE 10 MG: at 06:13

## 2020-01-23 RX ADMIN — DEXAMETHASONE SODIUM PHOSPHATE 4 MG: 4 INJECTION, SOLUTION INTRAMUSCULAR; INTRAVENOUS at 14:59

## 2020-01-23 RX ADMIN — DEXAMETHASONE SODIUM PHOSPHATE 4 MG: 4 INJECTION, SOLUTION INTRAMUSCULAR; INTRAVENOUS at 08:02

## 2020-01-23 RX ADMIN — LABETALOL 20 MG/4 ML (5 MG/ML) INTRAVENOUS SYRINGE 10 MG: at 03:45

## 2020-01-23 RX ADMIN — ACETAMINOPHEN 650 MG: 325 TABLET, FILM COATED ORAL at 12:46

## 2020-01-23 RX ADMIN — ACETAMINOPHEN 650 MG: 325 TABLET, FILM COATED ORAL at 08:24

## 2020-01-23 RX ADMIN — DEXAMETHASONE SODIUM PHOSPHATE 4 MG: 4 INJECTION, SOLUTION INTRAMUSCULAR; INTRAVENOUS at 03:17

## 2020-01-23 RX ADMIN — Medication 0.2 MG: at 02:52

## 2020-01-23 RX ADMIN — SODIUM CHLORIDE: 9 INJECTION, SOLUTION INTRAVENOUS at 03:41

## 2020-01-23 RX ADMIN — DEXAMETHASONE SODIUM PHOSPHATE 4 MG: 4 INJECTION, SOLUTION INTRAMUSCULAR; INTRAVENOUS at 21:46

## 2020-01-23 RX ADMIN — Medication 10 MEQ: at 08:03

## 2020-01-23 ASSESSMENT — ACTIVITIES OF DAILY LIVING (ADL)
ADLS_ACUITY_SCORE: 14
ADLS_ACUITY_SCORE: 14
ADLS_ACUITY_SCORE: 13
ADLS_ACUITY_SCORE: 14
PREVIOUS_RESPONSIBILITIES: MEAL PREP;HOUSEKEEPING;LAUNDRY;SHOPPING;MEDICATION MANAGEMENT;FINANCES;DRIVING

## 2020-01-23 ASSESSMENT — VISUAL ACUITY
OU: BASELINE;GLASSES

## 2020-01-23 NOTE — ANESTHESIA POSTPROCEDURE EVALUATION
Anesthesia POST Procedure Evaluation    Patient: Sheila Barraza   MRN:     [id number] Gender:   female   Age:    69 year old :      1950        Preoperative Diagnosis: Cavernoma [D18.00]   Procedure(s):  STEALTH GUIDED SUBOCCIPITAL CRANIOTOMY FOR RESECTION OF CAVERNOUS MALFORMATION WITH NEUROMONITORING   Postop Comments: No value filed.       Anesthesia Type:  Not documented  General    Reportable Event: NO     PAIN: Uncomplicated   Sign Out status: Comfortable, Well controlled pain     PONV: No PONV   Sign Out status:  No Nausea or Vomiting     Neuro/Psych: Uneventful perioperative course   Sign Out Status: Preoperative baseline; Age appropriate mentation     Airway/Resp.: Uneventful perioperative course   Sign Out Status: Non labored breathing, age appropriate RR; Resp. Status within EXPECTED Parameters     CV: Uneventful perioperative course   Sign Out status: Appropriate BP and perfusion indices; Appropriate HR/Rhythm     Disposition:   Sign Out in:  PACU  Disposition:  Floor  Recovery Course: Uneventful  Follow-Up: Not required           Last Anesthesia Record Vitals:  CRNA VITALS  2020 1601 - 2020 1701      2020             Pulse:  78    ART BP:  133/53    SpO2:  98 %          Last PACU Vitals:  Vitals Value Taken Time   /62 2020  6:40 PM   Temp 36.7  C (98.1  F) 2020  5:00 PM   Pulse 80 2020  6:40 PM   Resp 20 2020  6:30 PM   SpO2 95 % 2020  6:42 PM   Temp src     NIBP 149/84 2020  4:39 PM   Pulse 78 2020  4:50 PM   SpO2 98 % 2020  4:50 PM   Resp     Temp     Ht Rate     Temp 2     Vitals shown include unvalidated device data.      Electronically Signed By: Russ Sharp MD, 2020, 6:43 PM

## 2020-01-23 NOTE — OR NURSING
Pt transported to 4A with RN and NST on cardiac monitor.   Report given to Domonique HARRIS and bedside neuro exam done with Beatrice MULLEN on 4A.

## 2020-01-23 NOTE — PROGRESS NOTES
01/23/20 0804   General Information   Onset Date 01/13/20   Start of Care Date 01/23/20  (re-eval)   Referring Physician Mandeep Fernandes MD   Patient Profile Review/OT: Additional Occupational Profile Info See Profile for full history and prior level of function   Patient/Family Goals Statement Pt did not state   Swallowing Evaluation Bedside swallow evaluation   Behaviorial Observations WFL (within functional limits)   Mode of current nutrition NPO   Respiratory Status Room air   Comments Brainstem cavernoma status post hemorrhage now resected via suboccipital craniotomy on 1/22/20. Patient with expected post-surgical course. At this time, patient is pending repeat PT/OT/SLP evaluations, and pending ongoing neurologic recovery. Pt known to ST caseload with previous recommendations for regular diet and thin liquids pre-operatively. Pt with new dysarthria and R sided facial weakness. Re-eval completed per MD orders to further assess oropharyngeal swallow functino.    Clinical Swallow Evaluation   Oral Musculature   (R sided facial weakness )   Structural Abnormalities none present   Dentition present and adequate   Mucosal Quality dry   Mandibular Strength and Mobility impaired   Oral Labial Strength and Mobility impaired pursing;impaired retraction   Lingual Strength and Mobility impaired anterior elevation   Velar Elevation intact   Buccal Strength and Mobility impaired   Laryngeal Function Cough;Throat clear;Swallow;Voicing initiated   Oral Musculature Comments R-sided facial weakness, dysarthria present    Additional Documentation Yes   Clinical Swallow Eval: Thin Liquid Texture Trial   Mode of Presentation, Thin Liquids cup;spoon;self-fed   Volume of Liquid or Food Presented x2 ice chips, thin h20 via tsp   Oral Phase of Swallow Premature pharyngeal entry   Pharyngeal Phase of Swallow impaired;coughing/choking   Diagnostic Statement Pt tolerated ice chips with no overt s/sx of aspiration. Thin  liquids via spoon resulted in overt s/sx of aspiration marked by coughing   Clinical Swallow Eval: Nectar Thick Liquid Texture Trial   Mode of Presentation, Nectar spoon;self-fed;cup   Volume of Nectar Presented 4 oz   Oral Phase, Ruma WFL   Pharyngeal Phase, Ruma intact   Diagnostic Statement Pt tolerated nectar thick liquids via spoon and cup with no overt s/sx of aspiration    Clinical Swallow Eval: Puree Solid Texture Trial   Mode of Presentation, Puree spoon;self-fed   Volume of Puree Presented 6 tbsp   Oral Phase, Puree Poor AP movement;Residue in oral cavity   Oral Residue, Puree right lip drooling   Pharyngeal Phase, Puree intact   Diagnostic Statement No overt s/sx of aspiration. Pt required prolonged time for AP transit with minimal oral residuals remaining. Pt was able to clear residuals with cues to use tongue sweep and utilize liquid rinse. Pt with minimal R anterior loss x1   VFSS Evaluation   VFSS Additional Documentation No   FEES Evaluation   Additional Documentation No   Swallow Compensations   Swallow Compensations Alternate viscosity of consistencies;Effortful swallow;Pacing;Multiple swallow;Reduce amounts   Results Oral difficulties only;Suspect aspiration   General Therapy Interventions   Planned Therapy Interventions Dysphagia Treatment   Dysphagia treatment Modified diet education;Instruction of safe swallow strategies;Compensatory strategies for swallowing;Oropharyngeal exercise training   Swallow Eval: Clinical Impressions   Skilled Criteria for Therapy Intervention Skilled criteria met.  Treatment indicated.   Functional Assessment Scale (FAS) 3   Treatment Diagnosis moderate oropharyngeal dysphagia    Diet texture recommendations Full liquid;Nectar thick liquids   Recommended Feeding/Eating Techniques alternate between small bites and sips of food/liquid;check mouth frequently for oral residue/pocketing;hard swallow w/ each bite or sip;maintain upright posture during/after eating for  30 mins;small sips/bites   Demonstrates Need for Referral to Another Service respiratory therapy;physical therapy;occupational therapy;dietitian   Therapy Frequency Daily   Predicted Duration of Therapy Intervention (days/wks) 2 weeks   Anticipated Discharge Disposition inpatient rehabilitation facility   Risks and Benefits of Treatment have been explained. Yes   Patient, family and/or staff in agreement with Plan of Care Yes   Clinical Impression Comments SLP: Re-evaluation completed per MD orders. Pt presents with moderate oropharyngeal dysphagia s/p brainstem cavernoma resection. Pt with R sided facial weakness, R lip swelling, and dysarthria present. Pt tolerated ice chip trials, nectar thick liquids, and pureed textures with no overt s/sx of aspiration. Thin liquids via tsp resulted in overt s/sx of aspiration marked by coughing. Pt required prolonged time for AP transit on pureed textures and resulted in mild oral residuals. Pt reported poor oral control and required tongue sweep and liquid rinse to clear residuals. Recommend pt initiate a nectar thick full liquid diet. Pt should be fully upright and alert for all PO, take small sips/bites, alternate between consistencies, and monitor for oral residuals. Caregivers to encourage independent completion of oropharyngeal exercises. ST to continue to follow. Anticipate pt will require ongoing ST upon discharge.    Total Evaluation Time   Total Evaluation Time (Minutes) 10

## 2020-01-23 NOTE — PROGRESS NOTES
"S: status post surgery. Patient reporting difficulty with articulating speech. No acute events overnight. Patient able to wean from nicardipine overnight. Reporting tapping on the left ear.     O:  Temp:  [97.9  F (36.6  C)-98.8  F (37.1  C)] 97.9  F (36.6  C)  Pulse:  [60-88] 64  Heart Rate:  [59-98] 64  Resp:  [8-26] 16  BP: (100-154)/(42-86) 124/68  MAP:  [70 mmHg-259 mmHg] 94 mmHg  Arterial Line BP: ()/() 99/86  SpO2:  [91 %-99 %] 96 %    Awake and alert.   Oriented x 4.    Extraocular muscles intact  Dysarthria present  Tongue protrusion is weak but able to move side to side; appears to deviate to the left   Baseline absent facial sensation in V1, V2, and V3 on left.   Smile and brow lift symmetric   Shoulder shrug symmetric  Hearing intact to conversation   Palate elevation with minor uvula deviation to the left   5/5 in bilateral upper and lower extremities  Finger nose finger with ataxia en route to target but patient is able to meet target bilaterally   Heel shin intact bilaterally   No pronator drift    Sensation grossly intact to light touch.   Gait testing deferred     Assessment:   Brainstem cavernoma status post hemorrhage now resected via suboccipital craniotomy on 1/22/20. Patient with expected post-surgical course. At this time, patient is pending repeat PT/OT/SLP evaluations, and pending ongoing neurologic recovery.    Plan:   - decadron 1 week taper for cerebral edema  - ENT consult for ear tapping  - NPO; strict; pending SLP evaluation   - if patient fails; consult for [place] placement   - sliding scale insulin while on decadron  - protonix while on decadron   - continue home cpap   - SBP goal < 140  - PT/OT/SLP  Dispo: 6A  DVT prophylaxis: SCDs; hold chemoprophylaxis     Oakes \"John\" MD [facility]   Neurosurgery, PGY-3    Please contact neurosurgery resident on call with questions.    Dial * * *[phone], enter [phone] when prompted.     I have reviewed the history. I have seen and examined the patient " myself and agree with the assessment and plan above.  JULIAN Prabhakar MD

## 2020-01-23 NOTE — PROGRESS NOTES
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 Overnight baseline tingling or L face and R fingertips with new tingling or R hand and arm. Speech dysarthric but improved over the course of the night. This morning alert and oriented. Complained of discomfort in left ear, present in past.    Assessment:  Sheila Barraza is a 68 yo woman with PMH of trigeminal neuralgia and hypertension who presented with stroke-like symptoms, found to have pontomedullary ICH 2/2 brainstem cavernoma now s/p resection 1/22/2020.    Plan:  Neuro:  #Pontomeduallry ICH, ICH Score = 1, NIHSS = 0  # Cavernoma s/p resection vis suboccipital craniotomy 1/22/20  - Decadron, taper over one week   - SBP Goal < 140  - Neuro checks q2H    - PT/OT/Speech eval     #Trigeminal Neuralgia  Prior balloon rhizotomies at the [facility], on the left side. Left sensory deficit     # Left ear discomfort  - Exam ear, possible ENT consult     Cardiovascular:  # Hypertension  - SBP goals of < 140   - Nicardipine infusion weaned off, PRN labetalol      Respiratory:  # No acute issues  -Pulse Oximetry  - Continue home CPAP     Gastrointestinal:  # No acute issues    # IBS   - PRN bowel regimen    # Nutrition  - Speech involved  - Full liquid diet, nectar thick    Renal:  # No acute issues   IVF: Discontinued now that cleared for diet     Endocrine:  # Glucose management  - Sliding scale insulin with Decadron     Hematological  # Leukocytosis - presumed secondary to surgery and steroids, afebrile    ID  No acute issues  - Pan-culture if febrile     DVT ppx: SCD, PPI while on Decadron  Lines: PIV  Dispo: Likely transfer  to 6A once bed available    Code: Full    Patient seen and discussed with Dr. Scout Jim MD PhD  Pulmonary Critical Care Fellow  [phone]    Prior to Admission Medications   Prior to Admission Medications   Prescriptions Last Dose Informant Patient Reported? Taking?   amLODIPine (NORVASC) 5 MG tablet 1/13/2020 at Unknown time  Yes Yes   Sig: Take 5 mg by mouth 2 times daily   atorvastatin (LIPITOR) 10 MG tablet 1/13/2020 at Unknown time  Yes Yes   Sig: Take 10 mg by mouth daily   clonazePAM (KLONOPIN) 0.5 MG tablet 1/13/2020 at Unknown time  Yes Yes   Sig: Take 0.5 mg by mouth 2 times daily as needed for anxiety   lisinopril (PRINIVIL/ZESTRIL) 10 MG tablet 1/13/2020 at Unknown time  Yes Yes   Sig: Take 10 mg by mouth daily   methylcellulose (CITRUCEL) powder 1/14/2020 at Unknown time  Yes Yes   Sig: Take 0.5 teaspoonful by mouth daily      Facility-Administered Medications: None     Medications     niCARdipine 40 mg in 200 mL 0.9% NaCl Stopped (01/22/20 2135)     sodium chloride 100 mL/hr at 01/23/20 0341       atorvastatin  20 mg Oral or Feeding Tube QPM     dexamethasone  4 mg Intravenous Q6H    Followed by     [START ON 1/24/2020] dexamethasone  3 mg Intravenous Q8H    Followed by     [START ON 1/26/2020] dexamethasone  2 mg Intravenous Q12H    Followed by     [START ON 1/28/2020] dexamethasone  1 mg Intravenous Q24H     insulin aspart  1-7 Units Subcutaneous TID AC     insulin aspart  1-5 Units Subcutaneous At Bedtime     pantoprazole (PROTONIX) IV  40 mg Intravenous Daily with breakfast     polyethylene glycol  17 g Oral BID     sodium chloride (PF)  3 mL Intracatheter Q8H     cholecalciferol  1,000 Units Oral Daily     Allergies   Allergies   Allergen Reactions     Beta Adrenergic Blockers      Lasix [Furosemide]      Nuts      Phenol Other (See Comments)     Sulfa Drugs      Tegretol [Carbamazepine]      Physical Exam   Objective:  Temp:  [97.9  F (36.6  C)-98.8  F (37.1  C)] 98.6  F (37   C)  Pulse:  [60-88] 64  Heart Rate:  [59-98] 64  Resp:  [8-26] 16  BP: (100-154)/(42-86) 124/68  MAP:  [70 mmHg-259 mmHg] 94 mmHg  Arterial Line BP: ()/() 99/86  SpO2:  [91 %-99 %] 96 %  No data recorded    General: Alert, well-appearing, sitting up in bed, in no acute distress.   HEENT: Right lower lip swelling   Respiratory: Non-labored breathing and speech  Cardiovascular: Regular rate and rhythm.   Gastrointestinal: Abdomen soft, non-distended, non-tender to palpation.   Extremities: Moving all four extremities. No limb deformities. No pedal edema.   Skin: As noted above. No rashes or lesions appreciated.    Neurologic Examination:  Mental Status: Awake and interactive. Oriented to person, place, time  Language: Speaks in full sentences at , following all commands  Speech: Dysarthric  Cranial Nerves: Pupils equal and reactive, Extraocular movements are intact, tongue with slight deviation to left  Motor: Moves all extremities spontaneously.   Coordination: Deferred    Labs:  ABG  Recent Labs   Lab 01/22/20 1212 01/22/20  1100   PH 7.38 7.35   PCO2 39 42   PO2 195* 259*   HCO3 23 23       CBC  Recent Labs   Lab 01/23/20 0412 01/22/20 2036 01/22/20 1212 01/22/20  1100 01/22/20  0548   WBC 26.5*  --   --   --   --    HGB 12.9  --  12.9 13.3 14.9    266  --   --   --      COAGS  No results for input(s): INR, PTT, AXA, FIBR in the last 168 hours.  INR:  Recent Labs   Lab Test 01/14/20 0226   INR 1.00      BMP  Recent Labs   Lab 01/23/20 0412 01/22/20 2036 01/22/20 1212 01/22/20  1100     --  134 133   POTASSIUM 3.8  --  3.9 4.0   CHLORIDE 107  --   --   --    CO2 22  --   --   --    BUN 22  --   --   --    CR 0.63 0.71  --   --    MICHAEL 8.0*  --   --   --      Liver panel:  Recent Labs   Lab Test 01/14/20  0226   PROTTOTAL 6.6*   ALBUMIN 3.3*   BILITOTAL 0.4   ALKPHOS 60   AST 12   ALT 25     Imaging  CT HEAD W/O CONTRAST 1/14/2020 4:46 AM     Provided History: eval change in cavernoma  hemorrhage.     Comparison: Outside study (in exceptions) 1/13/2020. Outside MRI  9/12/2012, 8/1/2012, outside CT 1/21/2011.     Technique: Using multidetector thin collimation helical acquisition  technique, axial, coronal and sagittal CT images from the skull base  to the vertex were obtained without intravenous contrast.      Findings:    Hyperdensity within the brainstem at the level of the left inferior  cerebellar peduncle with some rightward extension into the medulla,  there is mild surrounding edema, most predominant on the right, lesion  measures 14 x 11 x 15 mm, this is minimally enlarged compared to  outside study dated 1/13/2020, at which time measured 13 x 9 x 15 mm.  Fourth ventricle is patent. There is calcified/hyperdense material  along the left petrous portion of the temporal bone. Notably, this  lesion is not defined on the CT scan outside from 1/21/2011, where  there is minimal dark signal on the MRI on T2-weighted images from  8/31/2012 with large venous angioma in that location.    Brain MRI without intravenous contrast  Head MRA without and with intravenous contrast     History:  evaluate cavernoma.  Comparison:  Head CT earlier same day and outside MR 8/1/2012    Findings:  Brain MRI:  There is a 1.0 x 1.1 cm heterogeneous T2 centrally hyperintense,  peripherally hypointense lesion within the left side of medulla  oblongata. T1 hyperintense internal blood products. Lesions. Compared  to 8/1/2012 dated outside MR, internal T1 hyperintensities are new and  lesion slightly larger, suggesting recent hemorrhage. No significant  change in large DVA in the left cerebellar hemisphere.     There is no mass effect or midline shift. Axial diffusion weighted  images demonstrate no definite acute infarction. The ventricles do not  appear enlarged out of proportion to the cerebral sulci. The major  intracranial vascular flow-voids do appear patent. Mild leukoaraiosis.     Head MRA:  Patent major  intracranial arteries. 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 The left posterior communicating artery  is patent. The right posterior communicating artery is not seen.                                                                       Impression:  1. Interval increase in the size of left medulla oblongata cavernous  malformation with new internal T1 hyperintensities compared to 2012,  suggesting recent hemorrhage.  2. Stable DVA within the left cerebellar hemisphere.  3. No aneurysm or arteriovenous malformation.    CT HEAD W/O CONTRAST 1/23/2020 9:15 AM     Provided History: s/p cavernoma resection     Comparison: CT 1/22/2020.     Technique: Using multidetector thin collimation helical acquisition  technique, axial, coronal and sagittal CT images from the skull base  to the vertex were obtained without intravenous contrast.      Findings:       Interval postsurgical changes of suboccipital craniotomy, with  resection of underlying cavernoma. Thin extra-axial collection  containing air and fluid underlying the craniotomy site. Scattered  foci of pneumocephalus, with layering pneumocephalus over the left  frontal convexity. No evidence of acute intracranial hemorrhage. There  is no midline shift. Pneumocephalus results in mild localized mass  effect upon the sulci of the left frontal convexity. The gray to white  matter differentiation of the cerebral hemispheres is preserved. The  basilar cisterns are patent. No hydrocephalus.     The visualized paranasal sinuses and mastoid air cells are clear. The  orbits are grossly unremarkable.                                                                      Impression: Postsurgical changes of suboccipital craniotomy for  resection of underlying cavernoma. Thin air and fluid containing  extra-axial collection underlying the craniotomy. Scattered  pneumocephalus throughout the cranium, with minimal mass effect, most  pronounced overlying the left  frontal convexity. No midline shift or  hydrocephalus. No acute intracranial hemorrhage..     No mass effect, or midline shift. The ventricles are proportionate to  the cerebral sulci. The gray to white matter differentiation of the  cerebral hemispheres is preserved. Minimal periventricular and deep  white matter hypoattenuation which is nonspecific though given  patient's age likely small vessel ischemic disease. Mild cerebral  atrophy. The basal cisterns are patent.     The visualized paranasal sinuses are clear. The mastoid air cells are  clear.                                                                    Impression:   Redemonstration of intraparenchymal hemorrhage from a presumed  cavernoma, of indeterminate age, within the left inferior brainstem  (inferior cerebellar peduncle) with mild surrounding edema as compared  to recent outside CT, as a venous angioma was present in this location  on an MRI from August 2012. This may be minimally increased in size,  but this could relate to technique, and a MRI would better evaluate  this and to evaluate for new components of hemorrhage.     [Result: Slight increase in size of brain stem hemorrhage. ]

## 2020-01-23 NOTE — PROGRESS NOTES
"SPIRITUAL HEALTH SERVICES  SPIRITUAL ASSESSMENT Progress Note  [facility] 4A     I will continue to follow pt/family during this admission. I visited pt and  this morning, one day post-surgery. Pt, said \"things went well, but it's a little hard to talk still because of swelling...\" Pt welcomed prayer in gratitude for successful surgery.    PLAN: continue to follow, will check in on pt again this week.    Jose De Jesus Fisher M.Div. (Bill), [facility]  Staff   Pager [phone]      "

## 2020-01-23 NOTE — PLAN OF CARE
Discharge Planner SLP   Patient plan for discharge: none stated   Current status: SLP: Re-evaluation completed per MD orders. Pt presents with moderate oropharyngeal dysphagia s/p brainstem cavernoma resection. Pt with R sided facial weakness, R lip swelling, and dysarthria present. Pt tolerated ice chip trials, nectar thick liquids, and pureed textures with no overt s/sx of aspiration. Thin liquids via tsp resulted in overt s/sx of aspiration marked by coughing. Pt required prolonged time for AP transit on pureed textures and resulted in mild oral residuals. Pt reported poor oral control and required tongue sweep and liquid rinse to clear residuals. Recommend pt initiate a nectar thick full liquid diet. Pt should be fully upright and alert for all PO, take small sips/bites, alternate between consistencies, and monitor for oral residuals. Caregivers to encourage independent completion of oropharyngeal exercises. ST to continue to follow. Anticipate pt will require ongoing ST upon discharge.  Barriers to return to prior living situation: dysphagia, aspiration risk, dysarthria   Recommendations for discharge: inpatient rehab  Rationale for recommendations: pt would benefit from continued ST to safely return to baseline diet level        Entered by: Nicki Mitchell 01/23/2020 8:28 AM

## 2020-01-23 NOTE — PROGRESS NOTES
01/23/20 1500   Quick Adds   Type of Visit Initial Occupational Therapy Evaluation   Living Environment   Lives With spouse   Living Arrangements condominium   Home Accessibility no concerns   Transportation Anticipated car, drives self   Self-Care   Usual Activity Tolerance excellent   Current Activity Tolerance fair   Regular Exercise Yes   Activity/Exercise Type walking   Exercise Amount/Frequency daily   Equipment Currently Used at Home none   Activity/Exercise/Self-Care Comment IND prior, walked 4 miles a day. 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    Functional Level   Ambulation 0-->independent   Transferring 0-->independent   Toileting 0-->independent   Bathing 0-->independent   Dressing 0-->independent   Eating 0-->independent   Communication 0-->understands/communicates without difficulty   Swallowing 0-->swallows foods/liquids without difficulty   Cognition 0 - no cognition issues reported   Fall history within last six months no   Which of the above functional risks had a recent onset or change? ambulation;transferring;toileting;bathing;dressing;cognition   Prior Functional Level Comment IND prior   General Information   Onset of Illness/Injury or Date of Surgery - Date 01/22/20   Referring Physician Dr Fernandes   Patient/Family Goals Statement get back to IND   Additional Occupational Profile Info/Pertinent History of Current Problem 70 yo woman with PMH of trigeminal neuralgia and hypertension who presented with stroke-like symptoms, found to have pontomedullary ICH 2/2 brainstem cavernoma now s/p resection 1/22/2020.   Precautions/Limitations other (see comments)  (crani)   General Observations Pt fatigued, family present. Pleasant and agreeable.    General Info Comments activity up w A   Cognitive Status Examination   Orientation orientation to person, place and time   Level of Consciousness alert   Memory impaired   Attention Distractible during evaluation   Cognitive Comment mild deficits noted    Visual Perception   Visual Perception Wears glasses   Visual Perception Comments blurry vision   Sensory Examination   Sensory Quick Adds Other (describe)   Sensory Comments fingers and toes new numbness   Pain Assessment   Patient Currently in Pain Yes, see Vital Sign flowsheet   Integumentary/Edema   Integumentary/Edema Comments swollen lips and face   Posture   Posture Comments intact holding walker   Range of Motion (ROM)   ROM Comment WFL   Strength   Strength Comments below baseline   Hand Strength   Hand Strength Comments intact   Coordination   Gross Motor Coordination deficits noted   Fine Motor Coordination deficits noted   Mobility   Bed Mobility Comments SBA   Transfer Skills   Transfer Comments Min A   Transfer Skill: Bed to Chair/Chair to Bed   Level of [place]: Bed to Chair minimum assist (75% patients effort)   Transfer Skill: Sit to Stand   Level of [place]: Sit/Stand minimum assist (75% patients effort)   Toilet Transfer   Toilet Transfer Comments min A using walker and grab bar   Transfer Skill: Toilet Transfer   Level of [place]: Toilet minimum assist (75% patients effort)   Balance   Balance Comments below baseline, cues for walker use   Toileting   Level of [place]: Toilet independent   Grooming   Level of [place]: Grooming stand-by assist   Physical Assist/Nonphysical Assist: Grooming supervision   Eating/Self Feeding   Level of [place]: Eating independent   Instrumental Activities of Daily Living (IADL)   Previous Responsibilities meal prep;housekeeping;laundry;shopping;medication management;finances;driving   IADL Comments caregiver for  with ca   Activities of Daily Living Analysis   Impairments Contributing to Impaired Activities of Daily Living balance impaired;cognition impaired;coordination impaired;pain;post surgical precautions;sensory feedback impaired;sensation decreased   General Therapy Interventions   Planned Therapy Interventions ADL  "retraining;IADL retraining;balance training;bed mobility training;fine motor coordination training;cognition;ROM;strengthening;transfer training;visual perception;home program guidelines   Clinical Impression   Criteria for Skilled Therapeutic Interventions Met yes, treatment indicated   OT Diagnosis crani for tumor resection   Influenced by the following impairments post op precautions pain, weakness, cognition, sensation   Assessment of Occupational Performance 5 or more Performance Deficits   Identified Performance Deficits all ADLs and IADLs are affected   Clinical Decision Making (Complexity) Moderate complexity   Therapy Frequency Daily   Predicted Duration of Therapy Intervention (days/wks) 2 weeks   Anticipated Discharge Disposition Acute Rehabilitation Facility   Risks and Benefits of Treatment have been explained. Yes   Patient, Family & other staff in agreement with plan of care Yes   [facility] TM \"6 Clicks\"   2016, Trustees of [facility], under license to [employer].  All rights reserved.   6 Clicks Short Forms Daily Activity Inpatient Short Form   [facility]  \"6 Clicks\" Daily Activity Inpatient Short Form   1. Putting on and taking off regular lower body clothing? 3 - A Little   2. Bathing (including washing, rinsing, drying)? 3 - A Little   3. Toileting, which includes using toilet, bedpan or urinal? 3 - A Little   4. Putting on and taking off regular upper body clothing? 3 - A Little   5. Taking care of personal grooming such as brushing teeth? 4 - None   6. Eating meals? 4 - None   Daily Activity Raw Score (Score out of 24.Lower scores equate to lower levels of function) 20   Total Evaluation Time   Total Evaluation Time (Minutes) 5     "

## 2020-01-23 NOTE — PLAN OF CARE
Discharge Planner PT   Patient plan for discharge: not discussed today     Current status: Pt performed bed mobility and supine<>sit with min A for trunk management. STS with min A. Ambulated ~80' with FWW and CGA - dec gait speed and intermittent staggering. Dysarthria present. She completed toilet transfer with use of grab bars and min A for hip guidance. A for set up for self cares. Educated on crani precautions.     Barriers to return to prior living situation: medical needs, current mobility, level of A    Recommendations for discharge: pending ongoing progress, ARU     Rationale for recommendations: Pt is well below baseline, she would benefit from ongoing therapy to continue to safely progress functional mobility and maximize independence. She has supportive family and anticipate she could tolerate extended therapy sessions. This was post-op day 1 and pt could very well progress away from ARU recommendation during IP stay. Will update as appropriate.        Entered by: Miladys Foy 01/23/2020 12:10 PM

## 2020-01-23 NOTE — PROGRESS NOTES
01/23/20 1200   Quick Adds   Type of Visit PT Re-evaluation  (Miladys Foy, PT, DPT )       Present no   Living Environment   Lives With spouse   Living Arrangements condominium   Home Accessibility no concerns   Transportation Anticipated car, drives self   Living Environment Comment At baseline, pt cares for SO (dressing changes, drives). SO safe to be home alone for periods of time. Pt was very active prior to onset of sx. Walking 2-4 mi/day. Lives in single floor condo with SO.    Self-Care   Usual Activity Tolerance excellent   Current Activity Tolerance fair   Regular Exercise Yes   Activity/Exercise Type walking   Exercise Amount/Frequency other (see comments)  (2-4 mi a day )   Equipment Currently Used at Home none   Activity/Exercise/Self-Care Comment Pt was indep in ADLs and also cared for SO at baseline    Functional Level Prior   Ambulation 0-->independent   Transferring 0-->independent   Toileting 0-->independent   Bathing 0-->independent   Communication 0-->understands/communicates without difficulty   Swallowing 0-->swallows foods/liquids without difficulty   Cognition 0 - no cognition issues reported   Fall history within last six months no   Which of the above functional risks had a recent onset or change? 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 Patient with expected post-surgical course. At this time, patient is pending repeat PT/OT/SLP evaluations, and pending ongoing neurologic recovery.   Precautions/Limitations fall precautions;other (see  comments)  (crani precautions )   General Info Comments activity: up with A    Cognitive Status Examination   Orientation orientation to person, place and time   Level of Consciousness alert   Follows Commands and Answers Questions 100% of the time   Personal Safety and Judgment intact   Memory intact   Pain Assessment   Patient Currently in Pain Yes, see Vital Sign flowsheet   Integumentary/Edema   Integumentary/Edema Comments bottom lip on R is slightly swollen, no other deficits identified    Posture    Posture Forward head position;Protracted shoulders   Range of Motion (ROM)   ROM Comment WFL throughout   Strength   Strength Comments grossly deconditioned, 4/5 clarence UE and LEs    Bed Mobility   Bed Mobility Comments HOB elevated, SBA, walks LE to EOB, utilizes bedrail to push up to sitting.    Transfer Skills   Transfer Comments She perform sSTS with use of momentum and CGA/HHA. also with FWW   Gait   Gait Comments ambulated with and without FWW, does much better with UE support. dec gait speed and step length. rigid.    Balance   Balance Comments not formally assessed, pt is high falls risk dt weakness and pain    Sensory Examination   Sensory Perception Comments endorses some N/T through R digits 2-4   Coordination   Coordination no deficits were identified   Muscle Tone   Muscle Tone no deficits were identified   General Therapy Interventions   Planned Therapy Interventions ADL retraining;balance training;bed mobility training;gait training;strengthening;transfer training;progressive activity/exercise;home program guidelines;risk factor education   Clinical Impression   Criteria for Skilled Therapeutic Intervention yes, treatment indicated   PT Diagnosis impaired functional mobility   Influenced by the following impairments pain, deconditioning, fatigue    Functional limitations due to impairments bed mobility, transfers, gait, activity tolerance, balance    Clinical Presentation Evolving/Changing   Clinical  "Presentation Rationale PMH, clinical judgement, current mobility, level of A   Clinical Decision Making (Complexity) Moderate complexity   Therapy Frequency 5x/week   Predicted Duration of Therapy Intervention (days/wks) 1 wk    Anticipated Equipment Needs at Discharge   (TBD)   Anticipated Discharge Disposition Other (see comments);Home with Assist;Acute Rehabilitation Facility  (pending ongoin gprogress)   Risk & Benefits of therapy have been explained Yes   Patient, Family & other staff in agreement with plan of care Yes   Clinical Impression Comments PT dianne completed, tx indicated    [facility] AM-PAC  \"6 Clicks\" V.2 Basic Mobility Inpatient Short Form   1. Turning from your back to your side while in a flat bed without using bedrails? 4 - None   2. Moving from lying on your back to sitting on the side of a flat bed without using bedrails? 2 - A Lot   3. Moving to and from a bed to a chair (including a wheelchair)? 3 - A Little   4. Standing up from a chair using your arms (e.g., wheelchair, or bedside chair)? 3 - A Little   5. To walk in hospital room? 3 - A Little   6. Climbing 3-5 steps with a railing? 2 - A Lot   Basic Mobility Raw Score (Score out of 24.Lower scores equate to lower levels of function) 17   Total Evaluation Time   Total Evaluation Time (Minutes) 7     "

## 2020-01-23 NOTE — PROGRESS NOTES
Major Shift Events:  Arrived last evening ~ 2130 from [place] after undergoing surgery to treat cavernoma that had hemorraged.  GALAVIZ. AAO and able to follow commands. Tingling persisted in left side of face and right hand. Now she believes tingling in right UE is going up her arm. Speech is garbled still but appears to be getting clearer. Was able to wean off nicardipine and gave prn labetolol x2 to keep sbp 140 or less. Remains npo. Has good urine output.  Plan: Will have speech consult today to assess swallow.   For vital signs and complete assessments, please see documentation flowsheets.

## 2020-01-23 NOTE — OR NURSING
Neurosurgery at bedside at 1800. Made aware of LUE weakness >than RUE, slurred speech and elevated BP on arterial line. .   Cuff /49 and A-line 152/55    Per Dena QUINTANA neurologically this is expected and to titrate Nicardipine from A-line pressure.

## 2020-01-23 NOTE — PLAN OF CARE
Discharge Planner OT   4A REYNOLD Patient plan for discharge: ARU  Current status: Pt below her baseline with deficits noted in vision, cognition, balance and sensation.  Pt needing cues for safe transfers and mobility using a walker and min A.  Good activity tolerance although well below her baseline.   Barriers to return to prior living situation: fall risk, cognition, vision, sensory, new precautions and apin  Recommendations for discharge: ARU  Rationale for recommendations: Pt presents with new deficits including balance, blurry vision, cognition, sensory changes, fatigue and weakness leading to decreased function and safety. Pt unable to . Needs to achieve  Mod IND with walker to return home where she is the primary caregiver for her . Per family the plan is for her to dc to the home of a family member when dc.  Pt will benefit from intensive, interdisciplinary ARU to address these deficits and to provide caregiver training to facilitate a safe dc to home.  Anticipate at discharge pt can and will be able to tolerate 3 hours of therapy.          Entered by: Nicki Siddiqi 01/23/2020 4:50 PM

## 2020-01-24 ENCOUNTER — APPOINTMENT (OUTPATIENT)
Dept: OCCUPATIONAL THERAPY | Facility: CLINIC | Age: 70
DRG: 020 | End: 2020-01-24
Attending: NEUROLOGICAL SURGERY
Payer: MEDICARE

## 2020-01-24 ENCOUNTER — APPOINTMENT (OUTPATIENT)
Dept: PHYSICAL THERAPY | Facility: CLINIC | Age: 70
DRG: 020 | End: 2020-01-24
Attending: NEUROLOGICAL SURGERY
Payer: MEDICARE

## 2020-01-24 ENCOUNTER — APPOINTMENT (OUTPATIENT)
Dept: SPEECH THERAPY | Facility: CLINIC | Age: 70
DRG: 020 | End: 2020-01-24
Attending: NEUROLOGICAL SURGERY
Payer: MEDICARE

## 2020-01-24 LAB
ANION GAP SERPL CALCULATED.3IONS-SCNC: 6 MMOL/L (ref 3–14)
BUN SERPL-MCNC: 17 MG/DL (ref 7–30)
CALCIUM SERPL-MCNC: 8.9 MG/DL (ref 8.5–10.1)
CHLORIDE SERPL-SCNC: 104 MMOL/L (ref 94–109)
CO2 SERPL-SCNC: 26 MMOL/L (ref 20–32)
COPATH REPORT: NORMAL
CREAT SERPL-MCNC: 0.63 MG/DL (ref 0.52–1.04)
ERYTHROCYTE [DISTWIDTH] IN BLOOD BY AUTOMATED COUNT: 12 % (ref 10–15)
GFR SERPL CREATININE-BSD FRML MDRD: 90 ML/MIN/{1.73_M2}
GLUCOSE BLDC GLUCOMTR-MCNC: 148 MG/DL (ref 70–99)
GLUCOSE BLDC GLUCOMTR-MCNC: 191 MG/DL (ref 70–99)
GLUCOSE BLDC GLUCOMTR-MCNC: 210 MG/DL (ref 70–99)
GLUCOSE BLDC GLUCOMTR-MCNC: 216 MG/DL (ref 70–99)
GLUCOSE SERPL-MCNC: 172 MG/DL (ref 70–99)
HCT VFR BLD AUTO: 34.9 % (ref 35–47)
HGB BLD-MCNC: 12.6 G/DL (ref 11.7–15.7)
MAGNESIUM SERPL-MCNC: 2.4 MG/DL (ref 1.6–2.3)
MCH RBC QN AUTO: 33 PG (ref 26.5–33)
MCHC RBC AUTO-ENTMCNC: 36.1 G/DL (ref 31.5–36.5)
MCV RBC AUTO: 91 FL (ref 78–100)
PHOSPHATE SERPL-MCNC: 2.6 MG/DL (ref 2.5–4.5)
PLATELET # BLD AUTO: 174 10E9/L (ref 150–450)
POTASSIUM SERPL-SCNC: 3.9 MMOL/L (ref 3.4–5.3)
RBC # BLD AUTO: 3.82 10E12/L (ref 3.8–5.2)
SODIUM SERPL-SCNC: 136 MMOL/L (ref 133–144)
WBC # BLD AUTO: 19.5 10E9/L (ref 4–11)

## 2020-01-24 PROCEDURE — 84100 ASSAY OF PHOSPHORUS: CPT | Performed by: STUDENT IN AN ORGANIZED HEALTH CARE EDUCATION/TRAINING PROGRAM

## 2020-01-24 PROCEDURE — 97110 THERAPEUTIC EXERCISES: CPT | Mod: GP

## 2020-01-24 PROCEDURE — 25000132 ZZH RX MED GY IP 250 OP 250 PS 637: Mod: GY | Performed by: STUDENT IN AN ORGANIZED HEALTH CARE EDUCATION/TRAINING PROGRAM

## 2020-01-24 PROCEDURE — 97530 THERAPEUTIC ACTIVITIES: CPT | Mod: GO | Performed by: OCCUPATIONAL THERAPIST

## 2020-01-24 PROCEDURE — 25800030 ZZH RX IP 258 OP 636: Performed by: STUDENT IN AN ORGANIZED HEALTH CARE EDUCATION/TRAINING PROGRAM

## 2020-01-24 PROCEDURE — 12000001 ZZH R&B MED SURG/OB UMMC

## 2020-01-24 PROCEDURE — 00000146 ZZHCL STATISTIC GLUCOSE BY METER IP

## 2020-01-24 PROCEDURE — 85027 COMPLETE CBC AUTOMATED: CPT | Performed by: STUDENT IN AN ORGANIZED HEALTH CARE EDUCATION/TRAINING PROGRAM

## 2020-01-24 PROCEDURE — 25000125 ZZHC RX 250: Performed by: STUDENT IN AN ORGANIZED HEALTH CARE EDUCATION/TRAINING PROGRAM

## 2020-01-24 PROCEDURE — 25000128 H RX IP 250 OP 636: Performed by: NURSE PRACTITIONER

## 2020-01-24 PROCEDURE — 36415 COLL VENOUS BLD VENIPUNCTURE: CPT | Performed by: STUDENT IN AN ORGANIZED HEALTH CARE EDUCATION/TRAINING PROGRAM

## 2020-01-24 PROCEDURE — 25000128 H RX IP 250 OP 636: Performed by: STUDENT IN AN ORGANIZED HEALTH CARE EDUCATION/TRAINING PROGRAM

## 2020-01-24 PROCEDURE — 97535 SELF CARE MNGMENT TRAINING: CPT | Mod: GO | Performed by: OCCUPATIONAL THERAPIST

## 2020-01-24 PROCEDURE — C9113 INJ PANTOPRAZOLE SODIUM, VIA: HCPCS | Performed by: STUDENT IN AN ORGANIZED HEALTH CARE EDUCATION/TRAINING PROGRAM

## 2020-01-24 PROCEDURE — 80048 BASIC METABOLIC PNL TOTAL CA: CPT | Performed by: STUDENT IN AN ORGANIZED HEALTH CARE EDUCATION/TRAINING PROGRAM

## 2020-01-24 PROCEDURE — 25000131 ZZH RX MED GY IP 250 OP 636 PS 637: Mod: GY | Performed by: STUDENT IN AN ORGANIZED HEALTH CARE EDUCATION/TRAINING PROGRAM

## 2020-01-24 PROCEDURE — 97530 THERAPEUTIC ACTIVITIES: CPT | Mod: GP

## 2020-01-24 PROCEDURE — 97116 GAIT TRAINING THERAPY: CPT | Mod: GP

## 2020-01-24 PROCEDURE — 25000132 ZZH RX MED GY IP 250 OP 250 PS 637: Mod: GY | Performed by: NURSE PRACTITIONER

## 2020-01-24 PROCEDURE — 83735 ASSAY OF MAGNESIUM: CPT | Performed by: STUDENT IN AN ORGANIZED HEALTH CARE EDUCATION/TRAINING PROGRAM

## 2020-01-24 PROCEDURE — 92526 ORAL FUNCTION THERAPY: CPT | Mod: GN

## 2020-01-24 RX ORDER — PANTOPRAZOLE SODIUM 40 MG/1
40 TABLET, DELAYED RELEASE ORAL
Status: DISCONTINUED | OUTPATIENT
Start: 2020-01-25 | End: 2020-01-28 | Stop reason: HOSPADM

## 2020-01-24 RX ADMIN — DEXAMETHASONE SODIUM PHOSPHATE 4 MG: 4 INJECTION, SOLUTION INTRAMUSCULAR; INTRAVENOUS at 08:14

## 2020-01-24 RX ADMIN — ACETAMINOPHEN 650 MG: 325 TABLET, FILM COATED ORAL at 14:40

## 2020-01-24 RX ADMIN — DEXAMETHASONE SODIUM PHOSPHATE 4 MG: 4 INJECTION, SOLUTION INTRAMUSCULAR; INTRAVENOUS at 03:03

## 2020-01-24 RX ADMIN — ACETAMINOPHEN 650 MG: 325 TABLET, FILM COATED ORAL at 18:44

## 2020-01-24 RX ADMIN — PANTOPRAZOLE SODIUM 40 MG: 40 INJECTION, POWDER, FOR SOLUTION INTRAVENOUS at 07:56

## 2020-01-24 RX ADMIN — DEXAMETHASONE SODIUM PHOSPHATE 3 MG: 4 INJECTION, SOLUTION INTRAMUSCULAR; INTRAVENOUS at 23:53

## 2020-01-24 RX ADMIN — LISINOPRIL 10 MG: 10 TABLET ORAL at 07:56

## 2020-01-24 RX ADMIN — SODIUM PHOSPHATE, MONOBASIC, MONOHYDRATE AND SODIUM PHOSPHATE, DIBASIC, ANHYDROUS 10 MMOL: 276; 142 INJECTION, SOLUTION INTRAVENOUS at 06:56

## 2020-01-24 RX ADMIN — INSULIN ASPART 1 UNITS: 100 INJECTION, SOLUTION INTRAVENOUS; SUBCUTANEOUS at 22:55

## 2020-01-24 RX ADMIN — MELATONIN 1000 UNITS: at 07:56

## 2020-01-24 RX ADMIN — ACETAMINOPHEN 650 MG: 325 TABLET, FILM COATED ORAL at 07:56

## 2020-01-24 RX ADMIN — ATORVASTATIN CALCIUM 20 MG: 20 TABLET, FILM COATED ORAL at 20:44

## 2020-01-24 RX ADMIN — POTASSIUM CHLORIDE 20 MEQ: 750 TABLET, EXTENDED RELEASE ORAL at 05:51

## 2020-01-24 RX ADMIN — DEXAMETHASONE SODIUM PHOSPHATE 4 MG: 4 INJECTION, SOLUTION INTRAMUSCULAR; INTRAVENOUS at 16:46

## 2020-01-24 RX ADMIN — LABETALOL 20 MG/4 ML (5 MG/ML) INTRAVENOUS SYRINGE 10 MG: at 05:06

## 2020-01-24 RX ADMIN — METHYLCELLULOSE 500 MG: 500 TABLET ORAL at 07:59

## 2020-01-24 ASSESSMENT — MIFFLIN-ST. JEOR: SCORE: 1093.88

## 2020-01-24 ASSESSMENT — ACTIVITIES OF DAILY LIVING (ADL)
ADLS_ACUITY_SCORE: 13
ADLS_ACUITY_SCORE: 12
ADLS_ACUITY_SCORE: 13
ADLS_ACUITY_SCORE: 14
ADLS_ACUITY_SCORE: 13
ADLS_ACUITY_SCORE: 14

## 2020-01-24 NOTE — PLAN OF CARE
Discharge Planner SLP   Patient plan for discharge: none stated   Current status: SLP: Pt with persistent s/sx of aspiration on thin liquids. Pt completed oropharyngeal exercises with clinician cues. Recommend pt advance to dysphagia diet 2 and continue nectar thick liquids. OK to initiate free water protocol (pt may have small amounts of ice chips/thin H20 between meals after thorough oral cares. Pt should have thickened liquids with all PO intake/pill administration). Caregivers to encourage independent completion of oropharyngeal exercises. ST to continue to follow.   Barriers to return to prior living situation: dysphagia, dysarthria, weakness   Recommendations for discharge: ARU  Rationale for recommendations: pt would benefit from continued ST to safely return to baseline diet level. Pt motivated to participate in ST sessions and will tolerate 3 hours of therapy per day        Entered by: Nicki Mitchell 01/24/2020 8:45 AM

## 2020-01-24 NOTE — PLAN OF CARE
Pt voiding, pain tolerable with tylenol, seen by speech and okay for nectar thick full liquids.  Pt doesn't have appetite.  Up with 1 and walker and gait belt.  Head CT obtained this am.    P: Continue current cares.  Transfer to  when bed available.

## 2020-01-24 NOTE — PROGRESS NOTES
"S: patient sustained a fall last night initially with headache. Headache improved. Patient underwent radiologic scan of CT head and neck without significant findings     O:  Temp:  [97.5  F (36.4  C)-97.9  F (36.6  C)] 97.7  F (36.5  C)  Pulse:  [57-75] 57  Heart Rate:  [60-78] 60  Resp:  [12-18] 16  BP: (134-189)/(72-91) 148/77  SpO2:  [95 %-98 %] 95 %    Awake and alert.   Oriented x 4.    Extraocular muscles intact  Dysarthria continues   Tongue protrusion deviating to the left   Baseline absent facial sensation in V1, V2, and V3 on left.   Brow lift symmetric   Tongue is edematous making it difficult to assess smile symmetry  Shoulder shrug symmetric  Hearing intact to conversation   5/5 in bilateral upper and lower extremities  Finger nose finger with ataxia en route to target but patient is able to meet target bilaterally   No pronator drift    Sensation grossly intact to light touch.     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 Fall sustained on 1/23 without additional new injuries.     Patient with expected post-surgical course. At this time, patient is pending repeat PT/OT/SLP evaluations and likely need for placement to facility.     Plan:   - decadron 1 week taper for cerebral edema  - nectar thick full liquid diet per SLP  - sliding scale insulin while on decadron  - protonix while on decadron   - continue home cpap   - SBP goal < 140  - PT/OT/SLP  - PMR consult   Dispo: 6A  DVT prophylaxis: SCDs; hold chemoprophylaxis     Oakes \"John\" MD [facility]   Neurosurgery, PGY-3    Please contact neurosurgery resident on call with questions.    Dial * * [phone], enter [phone] when prompted.     I have reviewed the history. I have seen and examined the patient myself and agree with the assessment and plan above.  JULIAN Prabhakar MD    "

## 2020-01-24 NOTE — PLAN OF CARE
Discharge Planner PT   Patient plan for discharge: not discussed today     Current status: Pt performs bed mobility and supine>sit with HOB elevated and SBA. Donns socks with supervision within precautions. Pt ambulated ~300' with FWW and CGA, slight R sided lean. Dec gait speed. Tolerated well. Performed seated therex to promtoe LE strengthening. Cont to co of N/T ankles to knees.     Barriers to return to prior living situation: medical needs, current mobility, level of A     Recommendations for discharge: ARU    Rationale for recommendations: Pt is well below baseline, she has interdisciplinary needs, has been making progress and would benefit from ongoing therapy to continue to progress strength and activity tolerance.        Entered by: Miladys Foy 01/24/2020 12:33 PM

## 2020-01-24 NOTE — PROGRESS NOTES
Social Work Services Progress Note    Hospital Day: 11  Date of Initial Social Work Evaluation:  1/14/2020  Collaborated with:  NeurosurgerySondra ([facility] Admissions), Yamileth ( ARU), Patient, Calos (Spouse) and Nica (Daughter)    Data:  URBANO spoke with neurosurgery, pt may be ready to discharge over the weekend or Monday.    Intervention:  Discharge Planning-  URBANO met with pt, Calos (Spouse) and Nica (Daughter) to discuss discharge plans.  URBANO educated them on potential discharge this weekend or Monday and on the recommendation of Acute Rehab.  Pt/family was given the Medicare Compare list for ARU to assist with choice for referrals/discharge planning Yes.      URBANO inquired about ARU preferences.  Pt stated that she is agreeable to going to [facility] in [place], [place].  SW inquired about back-up preferences.  If pt is unable to go to this facility, she stated that she is not interested in any other ARUs and would then prefer to discharge home.  URBANO then discussed transportation and private pay expense of w/c.  Family is planning to transport pt at discharge, they would prefer to leave by 9am at the latest on the day of discharge as it is a 10-12 hour drive.    [facility] in [place]-  DECLINED  -URBANO spoke with Sondra in admissions (P:  [phone];  F:  [phone]), they anticipate openings this weekend/ Monday.  They do accept weekend admissions however, they prefer to take out of state patients Monday through Friday.  URBANO faxed over referral information for her to review.  URBNAO heard back from Sondra, they have declined him for admission as they are not taking any short length of stay patients and they do not anticipate her LOS to be great than 7-10 days.    URBANO spoke with Nica (Daughter) via the telephone and updated her on the above information.  She stated that after discussing as a family, they would like to look into [facility] (pt is agreeable) as they are concerned about the transport home.  She also  inquired if [facility] in [facility] had an ARU, SW confirmed that they do not.    [facility]-  REFERRAL PENDING  SW spoke with Yamileth ( ARU Liaison) and initiated a referral.  SW heard back that pt currently clinically meets criteria however, they do not have any openings until Monday (they cannot confirm on Monday as they have other pending referrals).  SW to f/u with her on Monday.    SW spoke with Nica (Daughter) and educated her on the above information.  SW educated her on the other acute rehab locations in the [facility].  She will consider these options, but would like to wait until Monday and f/u with [facility] first.  SW will f/u on Monday.      Assessment:  Pt was alert, oriented and engaged in discharge planning discussion.    Plan:    Anticipated Disposition:  ARU    Barriers to d/c plan:  Medical Stability and ARU Placement (no placement until Monday at the earliest)    Follow Up:   will continue to follow to provide support and continue discharge plans as identified.    ARTIS Arredondo, [facility]  ICU   [facility]  Phone:  [phone]  Pager:  [phone]

## 2020-01-24 NOTE — PROGRESS NOTES
Transferred to 6A in wheelchair with belongings. Report given to RN on 6A.  Diet advanced by speech, prn tylenol for pain, up with gait belt and walker.

## 2020-01-24 NOTE — PROGRESS NOTES
CLINICAL NUTRITION SERVICES - BRIEF NOTE    Nutrition Prescription    RECOMMENDATIONS FOR MDs/PROVIDERS TO ORDER:  - Continue to ADAT per SLP     Recommendations already ordered by Registered Dietitian (RD):  - Ordered oral nutrition supplements with meals TID to encourage kcal/PRO intakes    Future/Additional Recommendations:  - SLP/diet; PO/supp adequacy      Nutrition Progress Note - f/u for progress towards previous nutrition POC (see previous reassessment for details)     Jenni Gray RD, LD, [facility]  Neuro ICU  Pager: [phone]

## 2020-01-24 NOTE — PLAN OF CARE
Status: Pt is on 6A for craniotomy on 1/22  Vitals: HTN w/in parameters  Neuros: A&O x4, strengths 5/5 w/ L slightly weaker than R, complained of N/T in both of her hands and BLE from knees down  IV: PIV is SL  Resp/trach: Lung sounds are clear bilaterally  Diet: DD2, tolerating well  Bowel status: Bowel sounds are active, no BM this shift  : Voiding spontaneously  Skin: Head incision is WDL and NIELS  Pain: Complained of a slight HA with some relief w/ PRN tylenol  Activity: SBA - Assist x1 w/ GB and walker  Social: Daughter and  have been present and have been supportive in her cares  Plan: Will continue to monitor and follow POC, probable discharge on Monday

## 2020-01-24 NOTE — PROGRESS NOTES
"Neurosurgery brief note:    Patient fell after attempting to wipe herself while on toilet. Patient did have head strike. Patient had immediate headache that by time of my evaluation had resolved. Stat head CT did not reveal new hemorrhage or skull fracture nor did her cervical spine scan show evidence of fracture. The patient was clinically unchanged on evaluation with continued dysarthria and asymmetric tongue protrusion but otherwise no changes in her upper cranial nerves or strength. No changes to plan or orders at this time.    Champ \"John\" MD [facility]   Neurosurgery, PGY-3    Please contact neurosurgery resident on call with questions.    Dial * * [phone], enter [phone] when prompted.     "

## 2020-01-24 NOTE — PROGRESS NOTES
SPIRITUAL HEALTH SERVICES  SPIRITUAL ASSESSMENT Progress Note  [facility] ([place]) 6A     Visited with pt and family, while PT was seeing pt. Pt, family, and therapy discussed potential discharge plans. Pt shared regarding her fall last night on 4A; pt and family grateful that she did not have any injury from fall. Pt's  requested my business card, expressed appreciation for  support throughout his wife hospital stay.     PLAN: no further needs indicated at this time.    Jose De Jesus Fisher M.Div (Bill)., [facility]  Staff   Pager [phone]

## 2020-01-25 ENCOUNTER — APPOINTMENT (OUTPATIENT)
Dept: PHYSICAL THERAPY | Facility: CLINIC | Age: 70
DRG: 020 | End: 2020-01-25
Attending: NEUROLOGICAL SURGERY
Payer: MEDICARE

## 2020-01-25 ENCOUNTER — APPOINTMENT (OUTPATIENT)
Dept: OCCUPATIONAL THERAPY | Facility: CLINIC | Age: 70
DRG: 020 | End: 2020-01-25
Attending: NEUROLOGICAL SURGERY
Payer: MEDICARE

## 2020-01-25 ENCOUNTER — APPOINTMENT (OUTPATIENT)
Dept: SPEECH THERAPY | Facility: CLINIC | Age: 70
DRG: 020 | End: 2020-01-25
Attending: NEUROLOGICAL SURGERY
Payer: MEDICARE

## 2020-01-25 LAB
ANION GAP SERPL CALCULATED.3IONS-SCNC: 6 MMOL/L (ref 3–14)
BUN SERPL-MCNC: 21 MG/DL (ref 7–30)
CALCIUM SERPL-MCNC: 8.8 MG/DL (ref 8.5–10.1)
CHLORIDE SERPL-SCNC: 104 MMOL/L (ref 94–109)
CO2 SERPL-SCNC: 25 MMOL/L (ref 20–32)
CREAT SERPL-MCNC: 0.64 MG/DL (ref 0.52–1.04)
ERYTHROCYTE [DISTWIDTH] IN BLOOD BY AUTOMATED COUNT: 11.9 % (ref 10–15)
GFR SERPL CREATININE-BSD FRML MDRD: 89 ML/MIN/{1.73_M2}
GLUCOSE BLDC GLUCOMTR-MCNC: 141 MG/DL (ref 70–99)
GLUCOSE BLDC GLUCOMTR-MCNC: 144 MG/DL (ref 70–99)
GLUCOSE BLDC GLUCOMTR-MCNC: 162 MG/DL (ref 70–99)
GLUCOSE BLDC GLUCOMTR-MCNC: 166 MG/DL (ref 70–99)
GLUCOSE BLDC GLUCOMTR-MCNC: 230 MG/DL (ref 70–99)
GLUCOSE SERPL-MCNC: 158 MG/DL (ref 70–99)
HCT VFR BLD AUTO: 35.8 % (ref 35–47)
HGB BLD-MCNC: 12.5 G/DL (ref 11.7–15.7)
LACTATE BLD-SCNC: 1.8 MMOL/L (ref 0.7–2)
MAGNESIUM SERPL-MCNC: 2.2 MG/DL (ref 1.6–2.3)
MCH RBC QN AUTO: 33.2 PG (ref 26.5–33)
MCHC RBC AUTO-ENTMCNC: 34.9 G/DL (ref 31.5–36.5)
MCV RBC AUTO: 95 FL (ref 78–100)
PHOSPHATE SERPL-MCNC: 2.9 MG/DL (ref 2.5–4.5)
PLATELET # BLD AUTO: 209 10E9/L (ref 150–450)
POTASSIUM SERPL-SCNC: 3.9 MMOL/L (ref 3.4–5.3)
RBC # BLD AUTO: 3.77 10E12/L (ref 3.8–5.2)
SODIUM SERPL-SCNC: 135 MMOL/L (ref 133–144)
WBC # BLD AUTO: 16 10E9/L (ref 4–11)

## 2020-01-25 PROCEDURE — 25000128 H RX IP 250 OP 636: Performed by: STUDENT IN AN ORGANIZED HEALTH CARE EDUCATION/TRAINING PROGRAM

## 2020-01-25 PROCEDURE — 92526 ORAL FUNCTION THERAPY: CPT | Mod: GN

## 2020-01-25 PROCEDURE — 97750 PHYSICAL PERFORMANCE TEST: CPT | Mod: GP

## 2020-01-25 PROCEDURE — 85027 COMPLETE CBC AUTOMATED: CPT | Performed by: NEUROLOGICAL SURGERY

## 2020-01-25 PROCEDURE — 97535 SELF CARE MNGMENT TRAINING: CPT | Mod: GO | Performed by: OCCUPATIONAL THERAPIST

## 2020-01-25 PROCEDURE — 80048 BASIC METABOLIC PNL TOTAL CA: CPT | Performed by: NEUROLOGICAL SURGERY

## 2020-01-25 PROCEDURE — 97116 GAIT TRAINING THERAPY: CPT | Mod: GP

## 2020-01-25 PROCEDURE — 84100 ASSAY OF PHOSPHORUS: CPT | Performed by: NEUROLOGICAL SURGERY

## 2020-01-25 PROCEDURE — 25000132 ZZH RX MED GY IP 250 OP 250 PS 637: Mod: GY | Performed by: NEUROLOGICAL SURGERY

## 2020-01-25 PROCEDURE — 83735 ASSAY OF MAGNESIUM: CPT | Performed by: NEUROLOGICAL SURGERY

## 2020-01-25 PROCEDURE — 36415 COLL VENOUS BLD VENIPUNCTURE: CPT | Performed by: NEUROLOGICAL SURGERY

## 2020-01-25 PROCEDURE — 00000146 ZZHCL STATISTIC GLUCOSE BY METER IP

## 2020-01-25 PROCEDURE — 25000132 ZZH RX MED GY IP 250 OP 250 PS 637: Mod: GY | Performed by: STUDENT IN AN ORGANIZED HEALTH CARE EDUCATION/TRAINING PROGRAM

## 2020-01-25 PROCEDURE — 83605 ASSAY OF LACTIC ACID: CPT | Performed by: NEUROLOGICAL SURGERY

## 2020-01-25 PROCEDURE — 12000001 ZZH R&B MED SURG/OB UMMC

## 2020-01-25 PROCEDURE — 97530 THERAPEUTIC ACTIVITIES: CPT | Mod: GP

## 2020-01-25 RX ORDER — LISINOPRIL 20 MG/1
20 TABLET ORAL DAILY
Status: DISCONTINUED | OUTPATIENT
Start: 2020-01-26 | End: 2020-01-28 | Stop reason: HOSPADM

## 2020-01-25 RX ADMIN — LISINOPRIL 10 MG: 10 TABLET ORAL at 08:52

## 2020-01-25 RX ADMIN — ACETAMINOPHEN 650 MG: 325 TABLET, FILM COATED ORAL at 18:14

## 2020-01-25 RX ADMIN — ACETAMINOPHEN 650 MG: 325 TABLET, FILM COATED ORAL at 14:03

## 2020-01-25 RX ADMIN — PANTOPRAZOLE SODIUM 40 MG: 40 TABLET, DELAYED RELEASE ORAL at 08:51

## 2020-01-25 RX ADMIN — ACETAMINOPHEN 650 MG: 325 TABLET, FILM COATED ORAL at 03:46

## 2020-01-25 RX ADMIN — DEXAMETHASONE SODIUM PHOSPHATE 3 MG: 4 INJECTION, SOLUTION INTRAMUSCULAR; INTRAVENOUS at 16:17

## 2020-01-25 RX ADMIN — DEXAMETHASONE SODIUM PHOSPHATE 3 MG: 4 INJECTION, SOLUTION INTRAMUSCULAR; INTRAVENOUS at 08:52

## 2020-01-25 RX ADMIN — ATORVASTATIN CALCIUM 20 MG: 20 TABLET, FILM COATED ORAL at 20:47

## 2020-01-25 RX ADMIN — MELATONIN 1000 UNITS: at 08:51

## 2020-01-25 RX ADMIN — ACETAMINOPHEN 650 MG: 325 TABLET, FILM COATED ORAL at 22:26

## 2020-01-25 ASSESSMENT — ACTIVITIES OF DAILY LIVING (ADL)
ADLS_ACUITY_SCORE: 14

## 2020-01-25 NOTE — PLAN OF CARE
Status: s/p craniotomy on 1/22  Vitals: stable except HTN but w/n MD parameters  Neuros: A&O x4. Strengths 5/5, L side slightly weaker than R. Numbness & tingling to both hands and BLE from knees down. At times reports feeling off-balance  IV: PIV SL  Resp/trach: WNL  Diet: DD2 w/ nectar thickened liquids  Bowel status: Small BM this shift, +BS  : Voiding spontaneously  Skin: Posterior head incision with sutures, some edema/swelling at the top, NIELS  Pain: Denies pain  Activity: Up with A1, GB, & walker. Pt had a recent fall when on 4A  Social: No visitor/family overnight  Plan: Awaiting ARU placement. Cont to monitor

## 2020-01-25 NOTE — PLAN OF CARE
6A PT  Discharge Planner PT   Patient plan for discharge: Rehab  Current status: Pt continues to present with balance deficits that increase fall risk. DGI performed, see note below. SBA for bed mobility, Malachi for transfers with FWW. Pt impulsive with transfers, demonstrates posterior trunk lean in standing. Pt ambulates 150ft with FWW and Malachi + 200ft without AD and mod-Malachi. Significant posterior trunk lean and varying step lengths, intermittent lateral path deviation. Several LOB with dynamic balance challenges requiring modA to recover balance.   Barriers to return to prior living situation: Medical status, impaired balance, fall risk, weakness, decreased activity tolerance, impaired cognition  Recommendations for discharge: ARC  Rationale for recommendations: Pt is well below baseline in regards to mobility and motivated to return to PLOF. Pt with deficits in balance, strength, and cognition and would benefit from continued skilled intensive interdisciplinary rehab services to address above deficits and progress safety and independence with functional mobility. Pt would tolerate 3hrs of therapy per day.       Entered by: Tamiko Antunez 01/25/2020 12:44 PM

## 2020-01-25 NOTE — PROGRESS NOTES
01/25/20 1239   Signing Clinician's Name / Credentials   Signing clinician's name / credentials Tamiko Antunez, PT, DPT   Dynamic Gait Index (Chan and Richter Kelly, 1995)   Gait Level Surface 2   Change in Gait Speed 2   Gait and Horizontal Head Turns 0   Gait with Vertical Head Turns 1   Gait and Pivot Turns 0   Step Over Obstacle 0   Step Around Obstacles 1   Steps 1   Total Dynamic Gait Index Score  (A score of 19 or less has been correlated to an increased risk of falls in community dwelling older adults, patients with vestibular disorders, and patients with MS.)   Total Score (out of 24) 7     Dynamic Gait Index (DGI):The DGI is a measure of balance during gait that is reliable and valid for the elderly and individuals with Parkinson's disease, MS, vestibular disorders, or s/p stroke. Gait assistive device used: None    Patient score: 7/24  Scores ?19/24 indicate an increased risk for falls according to [place] et al 2000  Minimal Detectable Change = 2.9 in community dwelling elderly according to Rehan et al 2011    Assessment (rationale for performing, application to patient s function & care plan): DGI performed to assess dynamic balance and gait safety. Pt's score and performance indicates pt is at increased risk for falls and should be utilizing an assistive device and have assist and a gait belt on at all times when transferring or ambulating.   Minutes billed as physical performance test: 9

## 2020-01-25 NOTE — PLAN OF CARE
Status: Pt is on 6A for craniotomy on 1/22  Vitals: HTN within parameters  Neuros: A&O x4, strengths 5/5 with L side slightly weaker than R, c/o NT in both hands and BLE from knees down, at times reports feeling off-balance  IV: PIV SL  Resp/trach: WNL  Diet: DD2, tolerating well  Bowel status: Active bowel sounds, no BM this shift  : Voiding spontaneously  Skin: Head incision WDL and NIELS  Pain: Denies pain  Activity: SBA Assist of 1 with GB and walker  Social: Family present this evening, supportive  Plan: Will continue to monitor and follow POC    Adilene Juarez, RN on 1/24/2020 at 11:25 PM

## 2020-01-25 NOTE — PROGRESS NOTES
"S: diet advanced yesterday. Transferred to floor yesterday. No events overnight.     O:  Temp:  [96.1  F (35.6  C)-97.4  F (36.3  C)] 96.1  F (35.6  C)  Pulse:  [61-80] 61  Heart Rate:  [59-76] 59  Resp:  [16-20] 16  BP: (127-159)/(64-79) 150/76  SpO2:  [94 %-98 %] 97 %    Awake and alert.   Oriented x 4.    Extraocular muscles intact  Dysarthria continues   Tongue protrusion deviating to the left   Baseline absent facial sensation in V1, V2, and V3 on left.   Brow lift symmetric   Tongue edematous   Shoulder shrug symmetric  Hearing intact to conversation   5/5 in bilateral upper and lower extremities  Finger nose finger with ataxia en route to target but patient is able to meet target bilaterally   No pronator drift    Sensation grossly intact to light touch.     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 In hospital fall sustained on 1/23 without additional new injuries.     Patient with expected post-surgical course. At this time, patient is pending ongoing PT/OT/SLP evaluations to facilitate ARU placement.     Plan:   - decadron 1 week taper for cerebral edema  - dysphagia diet 2 with nectar thick liquids   - sliding scale insulin while on decadron  - home lisinopril   - protonix while on decadron   - continue home cpap   - SBP goal < 140  - PT/OT/SLP  - PMR consult pending   Dispo: 6A  DVT prophylaxis: SCDs; hold chemoprophylaxis     Oakes \"John\" MD [facility]   Neurosurgery, PGY-3    Please contact neurosurgery resident on call with questions.    Dial * * [phone], enter [phone] when prompted.     "

## 2020-01-25 NOTE — PLAN OF CARE
Discharge Planner OT   6A   Patient plan for discharge: rehab  Current status: Pt continues to be limited by deficits in balance, memory and insight.  Feels her memory is intact but demonstrated errors in carryover from prior sessions.  Min A in room tranfers and hallway household distance mobility with walker.  Pt often off balance needing stabilization and cues to maintain balance.   Barriers to return to prior living situation: high fall risk, cognition, new precautions, balance  Recommendations for discharge: ARU  Rationale for recommendations: Pt presents with new deficits including balance, blurry vision, cognition, sensory changes, fatigue and weakness leading to decreased function and safety. Pt unable to . Needs to achieve  Mod IND with walker to return home where she is the primary caregiver for her . Per family the plan is for her to dc to the home of a family member when dc.  Pt will benefit from intensive, interdisciplinary ARU to address these deficits and to provide caregiver training to facilitate a safe dc to home.  Anticipate at discharge pt can and will be able to tolerate 3 hours of therapy.        Entered by: Nicki Siddiqi 01/24/2020 9:00 PM

## 2020-01-25 NOTE — PLAN OF CARE
Status: s/p crani on 1/22  Vitals: VSS on RA  Neuros: A&Ox4. Blurry vision, 5/5 strengths, L side slightly weaker then R, N/T to BUE hands and BLE knees down, reports sometimes feeling off balance  IV: PIV SL  Resp/trach: WNL  Diet: Dd2/nectar  Bowel status: Frequent small BMs  : Voiding without difficulty  Skin: Posterior heads incision w/sutures NIELS w/ some swelling/edema  Pain: Denies pain  Activity: A1+GB+walker, had fall on 4a  Social: Family visited this shift  Plan: Continue to monitor and follow POC, awaiting ARU placement.

## 2020-01-25 NOTE — PLAN OF CARE
"Discharge Planner SLP   Patient plan for discharge: none stated   Current status: SLP: Pt with persisting s/sx of aspiration on thin liquids, however pt continues to deny any trouble swallowing and attributes positive s/sx of aspiration to her \"swollen lip\" despite extensive education re: oropharyngeal swallowing mechanism and aspiration risk. Recommend pt continue dysphagia diet 2 and continue nectar thick liquids. OK to initiate free water protocol (pt may have small amounts of ice chips/thin H20 between meals after thorough oral cares. Pt should have thickened liquids with all PO intake/pill administration). Caregivers to encourage independent completion of oropharyngeal exercises. Pt would benefit from completion of instrumental swallow study for education/biofeedback of current swallowing deficits, however pt is currently declining this recommendation. ST to continue to follow.   Barriers to return to prior living situation: dysphagia, dysarthria, cognition   Recommendations for discharge: ARU  Rationale for recommendations: pt would benefit from continued ST to safely return to baseline diet level. Pt likely to tolerate 3 hours of therapy per day        Entered by: Nicki Mitchell 01/25/2020 12:37 PM       "

## 2020-01-25 NOTE — PLAN OF CARE
Discharge Planner OT   Patient plan for discharge: rehab  Current status: Pt asking many questions about different levels of rehab and safety with her dc to home. Educated pt and family on results of cognitive and PT balance screens along with fall risk post op.  Pt scored BNL on [place]-Blind (pt is unable to see clearly enough for full screen).  Pt reports blurry vision but not double, not improved with unilateral vision.    [place] Cognitive Assessment (MOCA-blind). Pt scored 9/22 indicating  impairment (<18 indicates impairment).      Memory Index Score (MIS) is a subset score of the MoCa. This scale provides additional information about the type of memory deficit: Deficits due to retrieval failures, performance can be improved with a cue. Deficits due to encoding failures, performance does not improve with a cue.   Pt scored 7/15 indicating deficits due to retrieval > encoding although deficits likely with both.        Barriers to return to prior living situation: high fall risk, cognition, new precautions, balance, low vision/new deficits  Recommendations for discharge: ARU  Rationale for recommendations: Pt presents with new deficits including balance, blurry vision, cognition, sensory changes, fatigue and weakness leading to decreased function and safety. Pt unable to . Needs to achieve  Mod IND with walker to return home where she is the primary caregiver for her . Per family the plan is for her to dc to the home of a family member when dc.  Pt will benefit from intensive, interdisciplinary ARU to address these deficits and to provide caregiver training to facilitate a safe dc to home.  Anticipate at discharge pt can and will be able to tolerate 3 hours of therapy.      Entered by: Nicki Siddiqi 01/25/2020 5:06 PM

## 2020-01-26 ENCOUNTER — APPOINTMENT (OUTPATIENT)
Dept: PHYSICAL THERAPY | Facility: CLINIC | Age: 70
DRG: 020 | End: 2020-01-26
Attending: NEUROLOGICAL SURGERY
Payer: MEDICARE

## 2020-01-26 ENCOUNTER — APPOINTMENT (OUTPATIENT)
Dept: SPEECH THERAPY | Facility: CLINIC | Age: 70
DRG: 020 | End: 2020-01-26
Attending: NEUROLOGICAL SURGERY
Payer: MEDICARE

## 2020-01-26 ENCOUNTER — APPOINTMENT (OUTPATIENT)
Dept: OCCUPATIONAL THERAPY | Facility: CLINIC | Age: 70
DRG: 020 | End: 2020-01-26
Attending: NEUROLOGICAL SURGERY
Payer: MEDICARE

## 2020-01-26 LAB
ABO + RH BLD: NORMAL
ABO + RH BLD: NORMAL
ALBUMIN UR-MCNC: 10 MG/DL
APPEARANCE UR: CLEAR
BILIRUB UR QL STRIP: NEGATIVE
BLD GP AB SCN SERPL QL: NORMAL
BLOOD BANK CMNT PATIENT-IMP: NORMAL
COLOR UR AUTO: YELLOW
GLUCOSE BLDC GLUCOMTR-MCNC: 132 MG/DL (ref 70–99)
GLUCOSE BLDC GLUCOMTR-MCNC: 135 MG/DL (ref 70–99)
GLUCOSE BLDC GLUCOMTR-MCNC: 159 MG/DL (ref 70–99)
GLUCOSE BLDC GLUCOMTR-MCNC: 160 MG/DL (ref 70–99)
GLUCOSE BLDC GLUCOMTR-MCNC: 205 MG/DL (ref 70–99)
GLUCOSE BLDC GLUCOMTR-MCNC: 222 MG/DL (ref 70–99)
GLUCOSE BLDC GLUCOMTR-MCNC: 261 MG/DL (ref 70–99)
GLUCOSE UR STRIP-MCNC: NEGATIVE MG/DL
HGB UR QL STRIP: NEGATIVE
KETONES UR STRIP-MCNC: NEGATIVE MG/DL
LEUKOCYTE ESTERASE UR QL STRIP: ABNORMAL
MUCOUS THREADS #/AREA URNS LPF: PRESENT /LPF
NITRATE UR QL: NEGATIVE
PH UR STRIP: 6.5 PH (ref 5–7)
RBC #/AREA URNS AUTO: 3 /HPF (ref 0–2)
SOURCE: ABNORMAL
SP GR UR STRIP: 1.03 (ref 1–1.03)
SPECIMEN EXP DATE BLD: NORMAL
SQUAMOUS #/AREA URNS AUTO: <1 /HPF (ref 0–1)
TRANS CELLS #/AREA URNS HPF: <1 /HPF (ref 0–1)
UROBILINOGEN UR STRIP-MCNC: 2 MG/DL (ref 0–2)
WBC #/AREA URNS AUTO: 2 /HPF (ref 0–5)

## 2020-01-26 PROCEDURE — 25000132 ZZH RX MED GY IP 250 OP 250 PS 637: Mod: GY | Performed by: STUDENT IN AN ORGANIZED HEALTH CARE EDUCATION/TRAINING PROGRAM

## 2020-01-26 PROCEDURE — 81001 URINALYSIS AUTO W/SCOPE: CPT | Performed by: STUDENT IN AN ORGANIZED HEALTH CARE EDUCATION/TRAINING PROGRAM

## 2020-01-26 PROCEDURE — 25000128 H RX IP 250 OP 636: Performed by: STUDENT IN AN ORGANIZED HEALTH CARE EDUCATION/TRAINING PROGRAM

## 2020-01-26 PROCEDURE — 97535 SELF CARE MNGMENT TRAINING: CPT | Mod: GO

## 2020-01-26 PROCEDURE — 00000146 ZZHCL STATISTIC GLUCOSE BY METER IP

## 2020-01-26 PROCEDURE — 92526 ORAL FUNCTION THERAPY: CPT | Mod: GN

## 2020-01-26 PROCEDURE — 97530 THERAPEUTIC ACTIVITIES: CPT | Mod: GP | Performed by: PHYSICAL THERAPIST

## 2020-01-26 PROCEDURE — 25000132 ZZH RX MED GY IP 250 OP 250 PS 637: Mod: GY | Performed by: NEUROLOGICAL SURGERY

## 2020-01-26 PROCEDURE — 86850 RBC ANTIBODY SCREEN: CPT | Performed by: NEUROLOGICAL SURGERY

## 2020-01-26 PROCEDURE — 86901 BLOOD TYPING SEROLOGIC RH(D): CPT | Performed by: NEUROLOGICAL SURGERY

## 2020-01-26 PROCEDURE — 97116 GAIT TRAINING THERAPY: CPT | Mod: GP | Performed by: PHYSICAL THERAPIST

## 2020-01-26 PROCEDURE — 86900 BLOOD TYPING SEROLOGIC ABO: CPT | Performed by: NEUROLOGICAL SURGERY

## 2020-01-26 PROCEDURE — 97530 THERAPEUTIC ACTIVITIES: CPT | Mod: GO

## 2020-01-26 PROCEDURE — 12000001 ZZH R&B MED SURG/OB UMMC

## 2020-01-26 PROCEDURE — 36415 COLL VENOUS BLD VENIPUNCTURE: CPT | Performed by: NEUROLOGICAL SURGERY

## 2020-01-26 RX ORDER — MULTIVIT-MIN/IRON/FOLIC ACID/K 18-600-40
1000 CAPSULE ORAL DAILY
COMMUNITY

## 2020-01-26 RX ORDER — LISINOPRIL 20 MG/1
20 TABLET ORAL DAILY
Status: ON HOLD | COMMUNITY
End: 2020-01-27

## 2020-01-26 RX ORDER — CEFTRIAXONE 1 G/1
1 INJECTION, POWDER, FOR SOLUTION INTRAMUSCULAR; INTRAVENOUS EVERY 24 HOURS
Status: DISCONTINUED | OUTPATIENT
Start: 2020-01-26 | End: 2020-01-27

## 2020-01-26 RX ADMIN — ACETAMINOPHEN 650 MG: 325 TABLET, FILM COATED ORAL at 15:15

## 2020-01-26 RX ADMIN — ATORVASTATIN CALCIUM 20 MG: 20 TABLET, FILM COATED ORAL at 19:42

## 2020-01-26 RX ADMIN — CEFTRIAXONE 1 G: 1 INJECTION, POWDER, FOR SOLUTION INTRAMUSCULAR; INTRAVENOUS at 14:18

## 2020-01-26 RX ADMIN — INSULIN ASPART 1 UNITS: 100 INJECTION, SOLUTION INTRAVENOUS; SUBCUTANEOUS at 22:50

## 2020-01-26 RX ADMIN — DEXAMETHASONE SODIUM PHOSPHATE 3 MG: 4 INJECTION, SOLUTION INTRAMUSCULAR; INTRAVENOUS at 15:15

## 2020-01-26 RX ADMIN — DEXAMETHASONE SODIUM PHOSPHATE 3 MG: 4 INJECTION, SOLUTION INTRAMUSCULAR; INTRAVENOUS at 08:24

## 2020-01-26 RX ADMIN — ACETAMINOPHEN 650 MG: 325 TABLET, FILM COATED ORAL at 08:23

## 2020-01-26 RX ADMIN — ACETAMINOPHEN 650 MG: 325 TABLET, FILM COATED ORAL at 02:46

## 2020-01-26 RX ADMIN — ACETAMINOPHEN 650 MG: 325 TABLET, FILM COATED ORAL at 11:35

## 2020-01-26 RX ADMIN — DEXAMETHASONE SODIUM PHOSPHATE 3 MG: 4 INJECTION, SOLUTION INTRAMUSCULAR; INTRAVENOUS at 00:14

## 2020-01-26 RX ADMIN — LISINOPRIL 20 MG: 20 TABLET ORAL at 08:23

## 2020-01-26 RX ADMIN — ACETAMINOPHEN 650 MG: 325 TABLET, FILM COATED ORAL at 19:42

## 2020-01-26 RX ADMIN — PANTOPRAZOLE SODIUM 40 MG: 40 TABLET, DELAYED RELEASE ORAL at 08:23

## 2020-01-26 RX ADMIN — MELATONIN 1000 UNITS: at 08:23

## 2020-01-26 ASSESSMENT — ACTIVITIES OF DAILY LIVING (ADL)
ADLS_ACUITY_SCORE: 14

## 2020-01-26 NOTE — PROGRESS NOTES
S: UA sent for burning urination    O:  Temp:  [95.7  F (35.4  C)-97.7  F (36.5  C)] 95.7  F (35.4  C)  Pulse:  [61-75] 63  Heart Rate:  [59-64] 62  Resp:  [16-24] 18  BP: (123-166)/(63-80) 134/72  SpO2:  [95 %-98 %] 97 %    Awake and alert.   Oriented x 4.    Extraocular muscles intact  Dysarthria continues   Tongue protrusion deviating to the left   Baseline absent facial sensation in V1, V2, and V3 on left.   Brow lift symmetric   Tongue edematous   Shoulder shrug symmetric  Hearing intact to conversation   5/5 in bilateral upper and lower extremities  Finger nose finger with ataxia en route to target but patient is able to meet target bilaterally   No pronator drift    Sensation grossly intact to light touch.     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 In hospital fall sustained on 1/23 without additional new injuries.     Patient with expected post-surgical course. At this time, patient is pending ongoing PT/OT/SLP evaluations to facilitate ARU placement.     Plan:   - decadron 1 week taper for cerebral edema  - dysphagia diet 2 with nectar thick liquids   - sliding scale insulin while on decadron  - home lisinopril   - protonix while on decadron   - continue home cpap   - SBP goal < 140  - PT/OT/SLP  Dispo: 6A, ARU when available  DVT prophylaxis: SCDs; hold chemoprophylaxis     Mandeep Fernandes MD  Neurosurgery Resident, PGY-1    Please contact neurosurgery resident on call with questions.    Dial * * [phone], enter [phone] when prompted.

## 2020-01-26 NOTE — PLAN OF CARE
Status: s/p craniotomy on 1/22  Vitals: stable on room air with CPAP overnight  Neuros: A&O x4. Slurred speech. Strengths 5/5, L side slightly weaker than R. Numbness & tingling to both hands and BLE from knees down. Pt reports feeling off-balance & some dizziness at times when ambulating  IV: PIV SL  Resp/trach: WNL  Diet: DD2 w/ nectar thickened liquids  Bowel status: Last BM 01/25, +BS  : c/o of some pain with urination & frequency, MD made aware. Need urine sample for UA  Skin: Posterior head incision with sutures, NIELS WDL  Pain: PRN Tylenol x1 for generalized discomfort  Activity: Up with A1, GB, & walker. Pt had a recent fall when on 4A  Social: No visitor/family overnight  Plan: Taper decadron. Awaiting ARU placement. Cont to monitor

## 2020-01-26 NOTE — PLAN OF CARE
Discharge Planner OT   Patient plan for discharge: ARU  Current status: repeated directions throughout session 2/2 to pt unable to remember session plan/next steps, SBA bed mobility, standing g/h with CGA, toilet transfer min A and IND alejo cares, amb ~250ft with CGA and 2WW with LOB x 1 corrected with min A, pt able to complete path finding activity with VC during amb with difficulty problem solving, money management task with pt struggling with more complex money tasks like giving change for an item  Barriers to return to prior living situation: cognition, vision, balance, pt main caregiver for spouse, generalized weakness  Recommendations for discharge: ARU - prefers location by [place]  Rationale for recommendations: pt is far below baseline and would benefit from intensive rehab to increase IND with ADL/IADLs, vision, cognition, and functional mobility. Pt also has multidisciplinary needs. Anticipate pt will be able to tolerate x3 hours of therapy a day.        Entered by: Evelyn Obrien 01/26/2020 9:43 AM

## 2020-01-26 NOTE — PLAN OF CARE
6A PT  Discharge Planner PT   Patient plan for discharge: Rehab  Current status: Pt presented with frequent urinary urgency, reduced safety and increased dizziness. While bathroom walks are beneficial for pt, pt at times rushing to get there, unsafe. May benefit from commode on the interim if dizziness persists today. DAVID required for transfers. Pt ambulated 2 x 50 ft with FWW, CGA and declined further activity due to her severe dizziness. Noted pts seated SBP 130s (therapist did not get a chance to jot down) and standing BP was 112/58, advised RN to re-assess orthostatic BPs further. Also ordered commode for short term use as pt at increased fall risk with present symptoms.   Barriers to return to prior living situation: Medical status, impaired balance, fall risk, weakness, decreased activity tolerance, impaired cognition  Recommendations for discharge: ARU  Rationale for recommendations: Pt is well below baseline in regards to mobility and motivated to return to PLOF. Pt with deficits in balance, strength, and cognition and would benefit from continued skilled intensive interdisciplinary rehab services to address above deficits and progress safety and independence with functional mobility. Pt overall has showed good tolerance for PT, only dizziness improved (very dizzy today) anticipate pt would tolerate 3hrs of therapy per day.       Entered by: Lorraine Frias 01/26/2020 3:09 PM

## 2020-01-26 NOTE — PLAN OF CARE
Discharge Planner SLP   Patient plan for discharge: none stated   Current status: SLP: Pt with improved PO tolerance today. Recommend pt advance to dysphagia diet 3 and thin liquids. Pt should be fully upright and alert for all PO, take small sips/bites, slow pacing, and alternate between consistencies. Caregivers to encourage independent completion of oropharyngeal exercises. ST to continue to follow.   Barriers to return to prior living situation: dysphagia, cognition, medical readiness   Recommendations for discharge: ARU  Rationale for recommendations: pt would benefit from continued ST to safely return to baseline diet level. Anticipate pt will tolerate 3 hours of therapy per day        Entered by: Nicki Mitchell 01/26/2020 10:52 AM

## 2020-01-26 NOTE — PHARMACY-ADMISSION MEDICATION HISTORY
Admission medication history interview status for the 1/13/2020 admission is complete. See Epic admission navigator for allergy information, pharmacy, prior to admission medications and immunization status.     Medication history interview sources:  Ambulatory Summary from [facility] Encounter on 11/14/2019 (via [facility])    Changes made to PTA medication list (reason)  Added: Cinnamon, Lecithin, Vitamin D  Deleted: Amlodipine, Clonazepam (previously filled 2 day supply early in this month)  Changed: Lisinopril dose changed from 10 mg daily to 20 mg daily    Additional medication history information (including reliability of information, actions taken by pharmacist): None      Prior to Admission medications    Medication Sig Last Dose Taking? Auth Provider   atorvastatin (LIPITOR) 10 MG tablet Take 10 mg by mouth daily 1/13/2020 at Unknown time Yes Reported, Patient   cinnamon 500 MG TABS Take 1,000 mg by mouth daily Unknown at Unknown Yes Unknown, Entered By History   LECITHIN PO Take 2 tablets by mouth daily Unknown at Unknown Yes Unknown, Entered By History   lisinopril (PRINIVIL/ZESTRIL) 20 MG tablet Take 20 mg by mouth daily 1/13/2020 at Unknown time Yes Unknown, Entered By History   methylcellulose (CITRUCEL) powder Take 0.5 teaspoonful by mouth daily 1/14/2020 at Unknown time Yes Reported, Patient   Vitamin D, Cholecalciferol, 25 MCG (1000 UT) TABS Take 1,000 Units by mouth daily Unknown at Unknown Yes Unknown, Entered By History         Medication history completed by:     Emile Raza, PharmD, BCPS  January 26, 2020

## 2020-01-26 NOTE — PLAN OF CARE
Status: Pt is on 6A for craniotomy on 1/22  Vitals: VSS, HTN within parameters  Neuros: A&O x4, strengths 5/5 with L side slightly weaker than R, c/o NT in both hands and BLE from knees down, at times reports feeling off-balance  IV: PIV SL  Resp/trach: On RA, stable  Diet: DD2, nectar thick liquids  Bowel status: No BM this shift  : Voiding spontaneously, patient is concerned about urgency   Skin: Head incision with some swelling and NIELS  Pain: Reports HA, PRN Tylenol given, patient reports effective  Activity: SBA Assist of 1 with GB and walker  Social:  present this evening  Plan: Will continue to monitor and follow POC.    Adilene Juarez RN on 1/25/2020 at 10:37 PM

## 2020-01-27 ENCOUNTER — APPOINTMENT (OUTPATIENT)
Dept: SPEECH THERAPY | Facility: CLINIC | Age: 70
DRG: 020 | End: 2020-01-27
Attending: NEUROLOGICAL SURGERY
Payer: MEDICARE

## 2020-01-27 ENCOUNTER — APPOINTMENT (OUTPATIENT)
Dept: PHYSICAL THERAPY | Facility: CLINIC | Age: 70
DRG: 020 | End: 2020-01-27
Attending: NEUROLOGICAL SURGERY
Payer: MEDICARE

## 2020-01-27 ENCOUNTER — DOCUMENTATION ONLY (OUTPATIENT)
Dept: CARE COORDINATION | Facility: CLINIC | Age: 70
End: 2020-01-27

## 2020-01-27 LAB
ANION GAP SERPL CALCULATED.3IONS-SCNC: 6 MMOL/L (ref 3–14)
BASOPHILS # BLD AUTO: 0 10E9/L (ref 0–0.2)
BASOPHILS NFR BLD AUTO: 0.2 %
BUN SERPL-MCNC: 23 MG/DL (ref 7–30)
CALCIUM SERPL-MCNC: 9.1 MG/DL (ref 8.5–10.1)
CHLORIDE SERPL-SCNC: 102 MMOL/L (ref 94–109)
CO2 SERPL-SCNC: 26 MMOL/L (ref 20–32)
CREAT SERPL-MCNC: 0.72 MG/DL (ref 0.52–1.04)
DIFFERENTIAL METHOD BLD: ABNORMAL
EOSINOPHIL # BLD AUTO: 0 10E9/L (ref 0–0.7)
EOSINOPHIL NFR BLD AUTO: 0 %
ERYTHROCYTE [DISTWIDTH] IN BLOOD BY AUTOMATED COUNT: 11.9 % (ref 10–15)
GFR SERPL CREATININE-BSD FRML MDRD: 84 ML/MIN/{1.73_M2}
GLUCOSE BLDC GLUCOMTR-MCNC: 104 MG/DL (ref 70–99)
GLUCOSE BLDC GLUCOMTR-MCNC: 121 MG/DL (ref 70–99)
GLUCOSE BLDC GLUCOMTR-MCNC: 124 MG/DL (ref 70–99)
GLUCOSE BLDC GLUCOMTR-MCNC: 139 MG/DL (ref 70–99)
GLUCOSE BLDC GLUCOMTR-MCNC: 146 MG/DL (ref 70–99)
GLUCOSE SERPL-MCNC: 142 MG/DL (ref 70–99)
HCT VFR BLD AUTO: 37 % (ref 35–47)
HGB BLD-MCNC: 13.3 G/DL (ref 11.7–15.7)
IMM GRANULOCYTES # BLD: 0.6 10E9/L (ref 0–0.4)
IMM GRANULOCYTES NFR BLD: 2.8 %
LYMPHOCYTES # BLD AUTO: 1.3 10E9/L (ref 0.8–5.3)
LYMPHOCYTES NFR BLD AUTO: 6.8 %
MCH RBC QN AUTO: 33.6 PG (ref 26.5–33)
MCHC RBC AUTO-ENTMCNC: 35.9 G/DL (ref 31.5–36.5)
MCV RBC AUTO: 93 FL (ref 78–100)
MONOCYTES # BLD AUTO: 0.8 10E9/L (ref 0–1.3)
MONOCYTES NFR BLD AUTO: 4.1 %
NEUTROPHILS # BLD AUTO: 16.8 10E9/L (ref 1.6–8.3)
NEUTROPHILS NFR BLD AUTO: 86.1 %
NRBC # BLD AUTO: 0 10*3/UL
NRBC BLD AUTO-RTO: 0 /100
PLATELET # BLD AUTO: 245 10E9/L (ref 150–450)
POTASSIUM SERPL-SCNC: 3.8 MMOL/L (ref 3.4–5.3)
RBC # BLD AUTO: 3.96 10E12/L (ref 3.8–5.2)
SODIUM SERPL-SCNC: 135 MMOL/L (ref 133–144)
WBC # BLD AUTO: 19.5 10E9/L (ref 4–11)

## 2020-01-27 PROCEDURE — 85025 COMPLETE CBC W/AUTO DIFF WBC: CPT | Performed by: NEUROLOGICAL SURGERY

## 2020-01-27 PROCEDURE — 25000132 ZZH RX MED GY IP 250 OP 250 PS 637: Mod: GY | Performed by: STUDENT IN AN ORGANIZED HEALTH CARE EDUCATION/TRAINING PROGRAM

## 2020-01-27 PROCEDURE — 92526 ORAL FUNCTION THERAPY: CPT | Mod: GN | Performed by: SPEECH-LANGUAGE PATHOLOGIST

## 2020-01-27 PROCEDURE — 25000132 ZZH RX MED GY IP 250 OP 250 PS 637: Mod: GY | Performed by: NURSE PRACTITIONER

## 2020-01-27 PROCEDURE — 00000146 ZZHCL STATISTIC GLUCOSE BY METER IP

## 2020-01-27 PROCEDURE — 25000131 ZZH RX MED GY IP 250 OP 636 PS 637: Mod: GY | Performed by: NURSE PRACTITIONER

## 2020-01-27 PROCEDURE — 80048 BASIC METABOLIC PNL TOTAL CA: CPT | Performed by: NEUROLOGICAL SURGERY

## 2020-01-27 PROCEDURE — 12000001 ZZH R&B MED SURG/OB UMMC

## 2020-01-27 PROCEDURE — 97116 GAIT TRAINING THERAPY: CPT | Mod: GP | Performed by: PHYSICAL THERAPIST

## 2020-01-27 PROCEDURE — 25000132 ZZH RX MED GY IP 250 OP 250 PS 637: Mod: GY | Performed by: NEUROLOGICAL SURGERY

## 2020-01-27 PROCEDURE — 25000128 H RX IP 250 OP 636: Performed by: STUDENT IN AN ORGANIZED HEALTH CARE EDUCATION/TRAINING PROGRAM

## 2020-01-27 PROCEDURE — 40000275 ZZH STATISTIC RCP TIME EA 10 MIN

## 2020-01-27 PROCEDURE — 36415 COLL VENOUS BLD VENIPUNCTURE: CPT | Performed by: NEUROLOGICAL SURGERY

## 2020-01-27 PROCEDURE — 97530 THERAPEUTIC ACTIVITIES: CPT | Mod: GP | Performed by: PHYSICAL THERAPIST

## 2020-01-27 RX ORDER — DEXAMETHASONE 2 MG/1
2 TABLET ORAL EVERY 12 HOURS SCHEDULED
Status: COMPLETED | OUTPATIENT
Start: 2020-01-27 | End: 2020-01-27

## 2020-01-27 RX ORDER — DEXTROSE MONOHYDRATE 25 G/50ML
25-50 INJECTION, SOLUTION INTRAVENOUS
Status: DISCONTINUED | OUTPATIENT
Start: 2020-01-27 | End: 2020-01-28 | Stop reason: HOSPADM

## 2020-01-27 RX ORDER — CLONAZEPAM 0.5 MG/1
0.5 TABLET ORAL 2 TIMES DAILY PRN
Status: ON HOLD | COMMUNITY
Start: 2020-01-27 | End: 2020-02-04

## 2020-01-27 RX ORDER — DEXAMETHASONE 1 MG
1 TABLET ORAL DAILY
Status: COMPLETED | OUTPATIENT
Start: 2020-01-28 | End: 2020-01-28

## 2020-01-27 RX ORDER — DIPHENHYDRAMINE HYDROCHLORIDE AND LIDOCAINE HYDROCHLORIDE AND ALUMINUM HYDROXIDE AND MAGNESIUM HYDRO
10 KIT EVERY 6 HOURS PRN
Status: DISCONTINUED | OUTPATIENT
Start: 2020-01-27 | End: 2020-01-28 | Stop reason: HOSPADM

## 2020-01-27 RX ORDER — HEPARIN SODIUM 5000 [USP'U]/.5ML
5000 INJECTION, SOLUTION INTRAVENOUS; SUBCUTANEOUS EVERY 8 HOURS
Status: DISCONTINUED | OUTPATIENT
Start: 2020-01-27 | End: 2020-01-27

## 2020-01-27 RX ORDER — NITROFURANTOIN 25; 75 MG/1; MG/1
100 CAPSULE ORAL EVERY 12 HOURS SCHEDULED
Status: DISCONTINUED | OUTPATIENT
Start: 2020-01-27 | End: 2020-01-27

## 2020-01-27 RX ORDER — DEXAMETHASONE 1 MG
1 TABLET ORAL DAILY
Status: ON HOLD | DISCHARGE
Start: 2020-01-28 | End: 2020-02-04

## 2020-01-27 RX ORDER — MECLIZINE HCL 12.5 MG 12.5 MG/1
12.5 TABLET ORAL 3 TIMES DAILY
Status: DISCONTINUED | OUTPATIENT
Start: 2020-01-27 | End: 2020-01-28 | Stop reason: HOSPADM

## 2020-01-27 RX ORDER — MECLIZINE HCL 12.5 MG 12.5 MG/1
12.5 TABLET ORAL 3 TIMES DAILY PRN
Status: ON HOLD | DISCHARGE
Start: 2020-01-27 | End: 2020-02-04

## 2020-01-27 RX ORDER — PANTOPRAZOLE SODIUM 40 MG/1
40 TABLET, DELAYED RELEASE ORAL
Status: ON HOLD | DISCHARGE
Start: 2020-01-28 | End: 2020-02-04

## 2020-01-27 RX ORDER — DEXAMETHASONE 2 MG/1
2 TABLET ORAL EVERY 12 HOURS
Status: ON HOLD | DISCHARGE
Start: 2020-01-27 | End: 2020-02-04

## 2020-01-27 RX ORDER — NICOTINE POLACRILEX 4 MG
15-30 LOZENGE BUCCAL
Status: DISCONTINUED | OUTPATIENT
Start: 2020-01-27 | End: 2020-01-28 | Stop reason: HOSPADM

## 2020-01-27 RX ORDER — CALCIUM CARBONATE 500 MG/1
2 TABLET, CHEWABLE ORAL 3 TIMES DAILY PRN
DISCHARGE
Start: 2020-01-27

## 2020-01-27 RX ORDER — ATORVASTATIN CALCIUM 20 MG/1
20 TABLET, FILM COATED ORAL EVERY EVENING
Status: ON HOLD | COMMUNITY
Start: 2020-01-27 | End: 2020-02-04

## 2020-01-27 RX ORDER — CALCIUM CARBONATE 500 MG/1
1000 TABLET, CHEWABLE ORAL 3 TIMES DAILY PRN
Status: DISCONTINUED | OUTPATIENT
Start: 2020-01-27 | End: 2020-01-28 | Stop reason: HOSPADM

## 2020-01-27 RX ORDER — LISINOPRIL 20 MG/1
20 TABLET ORAL DAILY
Status: ON HOLD | COMMUNITY
Start: 2020-01-27 | End: 2020-02-04

## 2020-01-27 RX ORDER — ACETAMINOPHEN 325 MG/1
325-650 TABLET ORAL EVERY 4 HOURS PRN
DISCHARGE
Start: 2020-01-27

## 2020-01-27 RX ADMIN — ACETAMINOPHEN 650 MG: 325 TABLET, FILM COATED ORAL at 17:32

## 2020-01-27 RX ADMIN — ACETAMINOPHEN 650 MG: 325 TABLET, FILM COATED ORAL at 09:08

## 2020-01-27 RX ADMIN — ACETAMINOPHEN 650 MG: 325 TABLET, FILM COATED ORAL at 13:13

## 2020-01-27 RX ADMIN — LISINOPRIL 20 MG: 20 TABLET ORAL at 09:08

## 2020-01-27 RX ADMIN — DEXAMETHASONE 2 MG: 2 TABLET ORAL at 21:14

## 2020-01-27 RX ADMIN — MECLIZINE 12.5 MG: 12.5 TABLET ORAL at 21:13

## 2020-01-27 RX ADMIN — MECLIZINE 12.5 MG: 12.5 TABLET ORAL at 10:48

## 2020-01-27 RX ADMIN — CALCIUM CARBONATE (ANTACID) CHEW TAB 500 MG 1000 MG: 500 CHEW TAB at 19:13

## 2020-01-27 RX ADMIN — MELATONIN 1000 UNITS: at 09:08

## 2020-01-27 RX ADMIN — ATORVASTATIN CALCIUM 20 MG: 20 TABLET, FILM COATED ORAL at 21:13

## 2020-01-27 RX ADMIN — MECLIZINE 12.5 MG: 12.5 TABLET ORAL at 14:15

## 2020-01-27 RX ADMIN — ACETAMINOPHEN 650 MG: 325 TABLET, FILM COATED ORAL at 00:16

## 2020-01-27 RX ADMIN — ACETAMINOPHEN 650 MG: 325 TABLET, FILM COATED ORAL at 04:33

## 2020-01-27 RX ADMIN — PANTOPRAZOLE SODIUM 40 MG: 40 TABLET, DELAYED RELEASE ORAL at 09:08

## 2020-01-27 RX ADMIN — ACETAMINOPHEN 650 MG: 325 TABLET, FILM COATED ORAL at 21:25

## 2020-01-27 RX ADMIN — DIPHENHYDRAMINE HYDROCHLORIDE AND LIDOCAINE HYDROCHLORIDE AND ALUMINUM HYDROXIDE AND MAGNESIUM HYDRO 10 ML: KIT at 18:16

## 2020-01-27 RX ADMIN — DEXAMETHASONE SODIUM PHOSPHATE 2 MG: 4 INJECTION, SOLUTION INTRAMUSCULAR; INTRAVENOUS at 00:15

## 2020-01-27 ASSESSMENT — ACTIVITIES OF DAILY LIVING (ADL)
ADLS_ACUITY_SCORE: 14

## 2020-01-27 NOTE — DISCHARGE SUMMARY
[facility] Discharge Summary and Instructions    Sheila Barraaz MRN# [id number]   Age: 69 year old YOB: 1950     Date of Admission:  1/13/2020  Date of Discharge::  1/28/2020  Admitting Physician:  Catarina Prabhakar MD  Discharge Physician:  Catarina Prabhakar MD          Admission Diagnoses:   Brain Stem Bleed  Cerebral cavernoma  AVM (arteriovenous malformation)          Discharge Diagnosis:     Brain Stem Bleed  Cerebral cavernoma  AVM (arteriovenous malformation)          Procedures:   STEALTH GUIDED SUBOCCIPITAL CRANIOTOMY FOR RESECTION OF CAVERNOUS MALFORMATION WITH NEUROMONITORING           Brief History of Illness:   Izabel Barraza is a 69-year-old female with a history of trigeminal neuralgia who presented to an outside hospital with concern of stroke-like symptoms in the setting of a known brainstem cavernoma.       The patient had reported right-sided weakness and inability to stand at the outside hospital beginning at 0900 on 1/13/20  The patient underwent a Head CT that revealed concern of hyperdensity in the pontomedullary junction.  Of note, the patient had actually presented to her local hospital on 12/23/2019, with similar symptoms.  The patient also had findings of a hyperdense lesion at that time at the pontomedullary junction.  She had intended to followup at the [facility] with her neurosurgeon regarding this lesion and for management of her trigeminal neuralgia.  Due to concern for recurrent symptoms, the patient was transferred down to the [facility] for further evaluation and treatment of a concern of a cerebral brainstem cavernoma.       The patient states that her cavernoma is known to have existed since at least 2015.  However, there is a report that states that there is concern for a developmental vascular anomaly at the pontomedullary junction back in 2012, based on an MRI. Of note, the patient has undergone 2 balloon rhizotomies at  the [facility], on the left side.  She states that at this time, her deficits include imbalance with ambulation and dizziness.  She also reported numbness in the left side of the face, though this is baseline since her rhizotomies.  She had been having difficulty swallowing and difficulty singing.  She denied blurry vision.  She further thinks that her strength is without deficit.  The patient previously had numbness and tingling in both arms; however, this has subsided.             Hospital Course:   The patient arrived to Unit 4A, Neuro-Surgical ICU on 1/14/20 for close Neurological Monitorig. At that time, an A-line was attempted to be placed, but was unsuccessful. Additionally, an MRI Brain was obtained that showed hemorrhage of the cavernoma and a stable developmental venous anomaly. Treatment optiosn were discussed with the patient, including surgical resection of the cavernoma. Patient elected to undergo the above mentioned procedure, however due to scheduling difficulties, the operation was not performed until 1/22/20. The operation was uncomplicated and she was admitted to the surgical ICU for routine post operative cares. Post-operative CT head scan on 1/23/20 demonstrated stable post-surgical changes. Postoperatively, the patient exhibited dysarthria and dysphagia, and was followed closely by Speech Language Pathology. She was initially placed on a full liquid diet, but advanced to dysphagia diet 1 (pureed) with liquids by the end of her hospitalization. She will need ongoing Speech Therapy to evaluate for advancement of her diet. On post operative day 1, she was doing well and transferred to the floor, however that evening, the patient fell and hit her head while attempted to clean herself on the toilet. A Head CT and C-Spine CT were obtained and were negative for acute pathology. The patient remained at neurologic post operative baseline. On post operative day 4, the patient reported urinary urgency. A  UA was obtained and findings were not consistent with a UTI.  On post operative day 5, the patient was ambulating, voiding without a maldonado, and post-operative pain was well controlled with oral analgesics. She was discharged to the ARU on post-operative day #6 per physical therapy and occupational therapy recommendations due to new deficits including balance, blurry vision, cognition (MOCA 9/22), sensory changes, fatigue and weakness leading to decreased function and safety.    Exam at discharge:  Awake and alert.   Oriented x 4.    Extraocular muscles intact  Dysarthria slightly lessened but persists   Tongue protrusion midline   Baseline absent facial sensation in V1, V2, and V3 on left.   Brow lift symmetric   Tongue edema present   Shoulder shrug symmetric  Hearing intact to conversation   5/5 in bilateral upper and lower extremities  Finger nose finger with ataxia en route to target but patient is able to meet target bilaterally   No pronator drift    Sensation grossly intact to light touch         Discharge Medications:     Current Discharge Medication List      START taking these medications    Details   acetaminophen (TYLENOL) 325 MG tablet 1-2 tablets (325-650 mg) by Oral or Feeding Tube route every 4 hours as needed for mild pain, fever or headaches    Comments: Indication: Mild Pain; Headache; Fever  Associated Diagnoses: Cavernoma      calcium carbonate (TUMS) 500 MG chewable tablet Take 2 tablets (1,000 mg) by mouth 3 times daily as needed for heartburn    Comments: Indication: Heartburn  Associated Diagnoses: Cavernoma      !! dexamethasone (DECADRON) 1 MG tablet Take 1 tablet (1 mg) by mouth daily for 1 dose    Comments: Indication: Cerebral Edema  Associated Diagnoses: Cavernoma      !! dexamethasone (DECADRON) 2 MG tablet Take 1 tablet (2 mg) by mouth every 12 hours for 1 dose    Comments: Indication: Cerebral Edema  Associated Diagnoses: Cavernoma      meclizine (ANTIVERT) 12.5 MG tablet Take 1  tablet (12.5 mg) by mouth 3 times daily as needed for dizziness    Comments: Indication: Dizziness; Vertigo  Associated Diagnoses: Cavernoma      methylcellulose (CITRUCEL) 500 MG TABS tablet Take 1 tablet (500 mg) by mouth daily    Comments: Indication: Prevention of Constipation      pantoprazole (PROTONIX) 40 MG EC tablet Take 1 tablet (40 mg) by mouth every morning (before breakfast)    Comments: Indication: GI Protectant with Concurrent use of Dexamethasone  Note: May discontinue when Dexamethasone taper is complete.  Associated Diagnoses: Cavernoma       !! - Potential duplicate medications found. Please discuss with provider.      CONTINUE these medications which have CHANGED    Details   atorvastatin (LIPITOR) 20 MG tablet Take 1 tablet (20 mg) by mouth every evening    Comments: Indication: Hyperlipidemia      clonazePAM (KLONOPIN) 0.5 MG tablet Take 1 tablet (0.5 mg) by mouth 2 times daily as needed for anxiety    Comments: Indication: Anxiety      lisinopril (PRINIVIL/ZESTRIL) 20 MG tablet Take 1 tablet (20 mg) by mouth daily    Comments: Indication: Hypertension         CONTINUE these medications which have NOT CHANGED    Details   cinnamon 500 MG TABS Take 1,000 mg by mouth daily      Vitamin D, Cholecalciferol, 25 MCG (1000 UT) TABS Take 1,000 Units by mouth daily         STOP taking these medications       LECITHIN PO Comments:   Reason for Stopping:         methylcellulose (CITRUCEL) powder Comments:   Reason for Stopping:                       Discharge Instructions and Follow-Up:     Discharge diet: Dysphagia Diet 1 (Puree) with Thin Liquids; No Straws; 1:1 Supervision with Meals     Continue Speech Therapy to Advance Diet   Discharge activity: You may advance activity as tolerated. No strenuous exercise or heay lifting greater than 10 lbs for 4 weeks or until seen and cleared in clinic.     Discharge follow-up: Please follow-up with Dr. Mitchell Prabhakar prior once discharged from rehab but prior to  returning to [place].  Please page Anna Holloway, ARASELI @ [phone] once discharge is anticipated for coordination of appointment scheduling.     Please schedule an appointment with Dr. Mitchell Prabhakar in the Neurosurgery Clinic in about 3 months with a repeat MRI Brain with and without Contrast. Please call [phone] to schedule your follow-up appointment.    Please schedule a follow-up appointment with your Primary Care Provider for re-evaluation of your Blood Sugars.      Wound care: Ok to shower,however no scrubbing of the wound and no soaking of the wound, meaning no bathtubs or swimming pools. Pat dry only. Leave wound open to air.    Sutures are not absorbable and need to be removed in 2 weeks. If patient still at rehab by this time, the sutures may be removed by the rehab physician if he or she considers that the wound has healed completely. Sutures should be removed on post-operative day #14 (2/5/2020).      Please call if you have:  1. increased pain, redness, drainage, swelling at your incision  2. fevers > 101.5 F degrees  3. with any questions or concerns.  You may reach the Neurosurgery clinic at [phone] during regular work hours. ER at [phone].    and ask for the Neurosurgery Resident on call at [phone], during off hours or weekends.         Discharge Disposition:     [facility]           Sophy Huang, MS3  Neurosurgery Student

## 2020-01-27 NOTE — CONSULTS
[facility]   PM&R CONSULT    Consulting Provider: Jared Williamson Do  Reason for Consult: Assessment of rehabilitation   Location of Patient: 6201  Date of Encounter: 1/27/2020   Date of Admission: 1/13/2020        ASSESSMENT/PLAN:    Ms. Sheila Barraza is a Right handed 69 year old yo female who presents with pontomedullary intracranial hemorrhage, s/p resection via a suboccipital craniotomy on 22 January. The patient has undergone 2 balloon rhizotomies at the [facility], on the left side.  She states that at this time, her deficits include imbalance with ambulation and dizziness.  She also has numbness in the left side of the face, though this is baseline since her rhizotomies.    She has neurogenic bowel, and bladder, notes she does not have any sensation when defecating or urinating. She has impaired sensation in the back of her face and buttocks. She notes dizziness, but not true vertigo. She has nystagmus on examination. In addition she states that she has a UTI, and has urinary urgency. These deficits lead to impaired mobility and adl's.   She was very independent prior to this admit, walking 4 miles regularly. 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 They live in [place], 11 hrs from here.   Her support system includes her daughter who is a teacher, patient plans to discharge to her daughter's home prior to returning to her home.   Recommend ARU placement with PT/OT/SLP needs. She is currently on DD3 with nectars and does not like drinking fluids,, counseled on dehydration and need to drink fluids. Dehydration may lead to orthostatic hypotension and add to her dizziness.   ELOS is 10-12 days  Daily physician supervision need for managing medical stability, monitoring blood pressures and fluid intake.     Thank you for consulting the PM&R Department.   For any questions, please feel free to page me at [phone]       Sofiya Ryan  MD   Department of PM&R        HPI:    Sheila Barraza is a 69-year-old female with past medical history significant for hypertension hyperlipidemia, irritable bowel syndrome, and trigeminal neuralgia who was admitted on 1- with symptoms of right-sided weakness in the setting of known brainstem cavernoma.  Further work-up revealed pontomedullary intracranial hemorrhage.  She underwent resection via a suboccipital craniotomy on 22 January.    Postoperative course was complicated by fall however she did not sustain any injuries.  She is currently on a dysphagia diet 3 with nectars   She was placed on Decadron for 1 week to taper for cerebral edema, started on insulin sliding scale given the steroid.    She was also started on home lisinopril with good control of the blood pressures  Discussion was had for possible anticoagulation to prevent DVTs which the patient refused.  She was seen by both PT OT and speech.      PREVIOUS LEVEL OF FUNCTION         CURRENT FUNCTION   She was seen by PT where she ambulated about 100 feet with rest breaks using a walker and contact-guard assist she is limited by severe dizziness and requires monitoring of the blood pressures.  She is also at increased risk for falls.  In OT she is able to complete pathfinding skills however requires verbal cues for problem solving.  She also requires assistance with money management tasks.  Vision balance and cognition are likely barriers.  Her DGI was evaluated to be 7.    LIVING SITUATION/SUPPORT  She lives with her .  At baseline she is the primary caretaker of her  who requires assistance with dressing, mobility and driving to his appointments.  Prior to this admit she was independent with all aspects of mobility and ADLs.  She was driving.  She walked about 4 miles a day.      SOCIAL HISTORY  Social History     Socioeconomic History     Marital status:      Spouse name: None     Number of children: None     Years of  education: None     Highest education level: None   Occupational History     None   Social Needs     Financial resource strain: None     Food insecurity:     Worry: None     Inability: None     Transportation needs:     Medical: None     Non-medical: None   Tobacco Use     Smoking status: Never Smoker     Smokeless tobacco: Never Used   Substance and Sexual Activity     Alcohol use: None     Drug use: None     Sexual activity: None   Lifestyle     Physical activity:     Days per week: None     Minutes per session: None     Stress: None   Relationships     Social connections:     Talks on phone: None     Gets together: None     Attends Bahai service: None     Active member of club or organization: None     Attends meetings of clubs or organizations: None     Relationship status: None     Intimate partner violence:     Fear of current or ex partner: None     Emotionally abused: None     Physically abused: None     Forced sexual activity: None   Other Topics Concern     None   Social History Narrative     None           Past Medical History:  Past Medical History:   Diagnosis Date     Dyslipidemia      Hypertension            Current Medications:  Current Facility-Administered Medications   Medication     acetaminophen (TYLENOL) tablet 325-650 mg     atorvastatin (LIPITOR) tablet 20 mg     bisacodyl (DULCOLAX) EC tablet 10 mg     bisacodyl (DULCOLAX) Suppository 10 mg     calcium carbonate (TUMS) chewable tablet 1,000 mg     clonazePAM (klonoPIN) half-tab 0.5 mg     dexamethasone (DECADRON) tablet 2 mg    Followed by     [START ON 1/28/2020] dexamethasone (DECADRON) tablet 1 mg     glucose gel 15-30 g    Or     dextrose 50 % injection 25-50 mL    Or     glucagon injection 1 mg     hydrALAZINE (APRESOLINE) injection 10-20 mg     insulin aspart (NovoLOG) inj (RAPID ACTING)     insulin aspart (NovoLOG) inj (RAPID ACTING)     labetalol (NORMODYNE/TRANDATE) syringe 10 mg     lisinopril (PRINIVIL/ZESTRIL) tablet 20 mg      magnesium sulfate 2 g in NS intermittent infusion ([facility] or [facility])     magnesium sulfate 4 g in 100 mL sterile water (premade)     meclizine (ANTIVERT) tablet 12.5 mg     methylcellulose (CITRUCEL) tablet 500 mg     naloxone (NARCAN) injection 0.1-0.4 mg     ondansetron (ZOFRAN-ODT) ODT tab 4-8 mg    Or     ondansetron (ZOFRAN) injection 4-8 mg     pantoprazole (PROTONIX) EC tablet 40 mg     polyethylene glycol (MIRALAX/GLYCOLAX) Packet 17 g     potassium chloride (KLOR-CON) Packet 20-40 mEq     potassium chloride 10 mEq in 100 mL intermittent infusion with 10 mg lidocaine     potassium chloride 10 mEq in 100 mL sterile water intermittent infusion (premix)     potassium chloride 20 mEq in 50 mL intermittent infusion     potassium chloride ER (K-DUR/KLOR-CON M) CR tablet 20-40 mEq     prochlorperazine (COMPAZINE) injection 5 mg    Or     prochlorperazine (COMPAZINE) tablet 5 mg    Or     prochlorperazine (COMPAZINE) Suppository 12.5 mg     sodium chloride (PF) 0.9% PF flush 3 mL     sodium chloride (PF) 0.9% PF flush 3 mL     sodium phosphate 10 mmol in D5W intermittent infusion     sodium phosphate 15 mmol in D5W intermittent infusion     sodium phosphate 20 mmol in D5W intermittent infusion     sodium phosphate 25 mmol in D5W intermittent infusion     Vitamin D3 (CHOLECALCIFEROL) 25 mcg (1000 units) tablet 1,000 Units         Review of Systems:  Total of ten systems reviewed, pertinent positives and negatives as follows  Instability with standing and walking.    Feels generally fatigued  Reports generalized weakness  Slurred speech  Notes tingling and numbness in both her hands and feet  Last bowel today  No problems with bladder.   No chest pain. No cough or SOB.  No headache or photophobia.   No nausea, or abdominal pain.  Slept poorly last night  No joint pain, muscle pain or swelling.  Mood is good, denies any symptoms of depression.   Remainder of the review of the systems was  "negative.          Labs   Lab Results   Component Value Date    WBC 19.5 (H) 01/27/2020    HGB 13.3 01/27/2020    HCT 37.0 01/27/2020    MCV 93 01/27/2020     01/27/2020     Lab Results   Component Value Date     01/27/2020    POTASSIUM 3.8 01/27/2020    CHLORIDE 102 01/27/2020    CO2 26 01/27/2020     (H) 01/27/2020     Lab Results   Component Value Date    GFRESTIMATED 84 01/27/2020    GFRESTBLACK >90 01/27/2020     Lab Results   Component Value Date    AST 12 01/14/2020    ALT 25 01/14/2020    ALKPHOS 60 01/14/2020    BILITOTAL 0.4 01/14/2020     Lab Results   Component Value Date    INR 1.00 01/14/2020     Lab Results   Component Value Date    BUN 23 01/27/2020    CR 0.72 01/27/2020         ON EXAMINATION:  Vitals:    01/27/20 0716 01/27/20 0935 01/27/20 0938 01/27/20 0950   BP: 128/73 (!) 149/82 127/73 139/71   BP Location: Right arm Right arm Right arm Right arm   Pulse: 65      Resp: 16      Temp: 97.2  F (36.2  C)      TempSrc: Oral      SpO2: 98%      Weight:       Height:           Physical Exam:  Blood pressure 139/71, pulse 65, temperature 97.2  F (36.2  C), temperature source Oral, resp. rate 16, height 1.651 m (5' 5\"), weight 57.8 kg (127 lb 6.8 oz), SpO2 98 %.    GEN: NAD, lying comfortably in bed  She is alert, appropriate, cooperative  Speech is   Comprehension is  HEENT: NCAT  MSK: full active and passive ROM at all major joints of the bilaterally upper and lower extremities  No muscle atrophy noted  ABD: soft, non tender, pos BS  NEURO:   CRANIAL NERVES: Discs flat. Pupils equal, round and reactive to light.  Extraocular movements full. Nystagmus on looking to the right Visual fields full. Face moves symmetrically.  Tongue midline. Hearing intact to finger rubbing.    Sensation: sensation to light touch is impaired in her lower face,   intact throughout otherwise    Strength: 5/5    Cognition: fund of knowledge and train of thought appropriate  SKIN: no rashes or lesions noted. "  EXT: no edema bilaterally, chronic stasis changes, no ulcers.   PSYCH: normal affect.  Mood is baseline with no signs of depression      Thank you for the consult. Please call for any questions. Pager number [phone]     Sofiya Ryan

## 2020-01-27 NOTE — PLAN OF CARE
Discharge Planner SLP   Patient plan for discharge: ARU  Current status: Patient seen for dysphagia treatment. Note frequent throat clearing after sips of water today and with solid texture. Noted 1-2 instances of throat clearing after sips of nectar thick liquid. Question if fatigue a factor with variable swallow performance.     Recommend changing diet to dysphagia diet level 2 with nectar thick liquid given swallow strategies (fully alert and upright position, no straws, small single sips/bites, slow rate, alternate liquids/solids). Patient to continue independent completion of swallow exercises.    Barriers to return to prior living situation: dysphagia, cognition, medical readiness   Recommendations for discharge: ARU  Rationale for recommendations: pt would benefit from continued ST to safely return to baseline diet level. Anticipate pt will tolerate 3 hours of therapy per day     Addendum: Patient concerned about hydration and mouth sores with current thickened liquids and requesting water between meals. Patient may have sips of thin water only with RN supervision away from other oral intake (at least 30 minutes after meals) after thorough oral cares. Patient must be seated upright fully and not use straws, taking small single sips. Patient to have thickened liquids at meals and with pills. Discontinue thin water if signs of aspiration present.         Entered by: Yue Thrasher 01/27/2020 2:11 PM

## 2020-01-27 NOTE — PLAN OF CARE
Vitals: VSS on RA, on home CPAP overnight  Neuros: A&O x4, generalized weakness. Slurred speech. Left facial droop, numbness to left side of face-baseline. Feels dizzy with activity, meclizine started today. Numbness and tingling in both hands and BLE from knees down. C/o not feeling when she is voiding/having BM. Numbness to bottom-charge updated and updated neurosurg.  IV: No PIV  Resp/trach: On RA, stable  Diet: Was on DD3 with thins, speech recommending DD2 with nectar thick and sent update to MD.   Bowel status: BS+x4, +BM today.   : Has been reporting pain with urination, postive for UTI, ABX  Skin: Posterior head incision intact with sutures, mild erythema/bruising-NIELS. Ulceration to inner/lower lip- on the list to update MD for magic mouthwash.  Pain: PRN Tylenol x2 this shift to stay ahead of pain per pt.   Activity: A1, GB, and walker. Ambulated in halls x2.  Social: Daughter and  at bedside this shift.  Plan: Discharge to [facility] tomorrow at 1400.

## 2020-01-27 NOTE — INTERIM SUMMARY
[facility] Pre-Admission Screen    Referral Source: [facility] - 6A  Admit date to referring facility: 1/13/2020    Rehab Diagnosis:    01.4 No Paresis Stroke: pontomedullary ICH + suboccipital craniotomy for resection of brainstem cavernous malformation    Justification for Acute Inpatient Rehabilitation  Izabel Barraza is a 69-year-old female with a history of trigeminal neuralgia, HTN, known brainstem cavernoma, who presents with concern of stroke-like symptoms. Pt presented to OSH with wobbly gait, dysequilibrium, right arm and left paresthesia and feeling generally weak and inability to walk. She underwent CT head that showed pontomedullary ICH 2/2 cavernoma. Pt is s/p suboccipital craniotomy for resection of cavernous malformation on 1/22/2020. Post-op course c/b fall on 1/23/2020, dizziness, orthostatic hypotension, and UTI. Pt is now medically ready for discharge to acute inpatient rehab.   Patient requires an intensive inpatient rehab program to address the following acute impairments: impaired activity tolerance, dizziness, impaired balance, impaired coordination, impaired cognition, dysphagia, dysarthria, impaired problem solving, impaired judgement and impaired safety awareness affecting her functional IND w/ mobility, ADLs and IADLs.     Current Active Medical Management Needs/Risks for Clinical Complications  The patient requires the high level of rehabilitation physician supervision that accompanies the provision of intensive rehabilitation therapy.  The patient needs the services of the rehabilitation physician to assess the patient medically and functionally and to modify the course of treatment as needed to maximize the patient's capacity to benefit from the rehabilitation process. The patient requires medical mgmt and assessment of: neurologic status in setting of ICH and suboccipital craniotomy for resection of brainstem cavernoma - including decadron taper for mgmt  of cerebral edema; pain (Acetaminophen); dizziness (Meclizine); cardiovascular status in setting of + orthostatic BPs and HTN w/ SBP goal <140mmHg (Lisinopril); HLD (Lipitor); assess blood sugars in pt at risk for steroid induced hyperglycemia (sliding scale insulin); nutritional status in pt on DD3 diet w/ thin liquids who is at risk for aspiration; bladder function in setting of UTI (Ceftriaxone); bowel function (Citrucel to prevent constipation); anxiety (Klonopin); pt at risk for falls w/ injury, skin breakdown, infection, and DVT.     Past Medical/Surgical History  Surgery in the past 100 days: Yes  Additional relevant past medical history: HTN, HLD, trigeminal neuralgia s/p two rhizotomies, inguinal hernia repair, brainstem cavernoma.     Level of Functioning prior to Admission:  Home Environment  Lives with: spouse Calos  Living arrangements: [facility]  Home accessibility: no concerns  Living environment comments: At baseline, pt cares for spouse (dressing changes, drives). Spouse safe to be home alone for periods of time. Pt was very active prior to onset of sx.   Equipment currently used at home: none  Activity/exercise/self-care comment: IND prior, walked 2-4 miles a day.    Ambulation: 0-->independent  Transferrin-->independent  Toiletin-->independent  Bathin-->independent  Dressin-->independent   Eatin-->independent  Communication: 0-->understands/communicates without difficulty  Swallowin-->swallows foods/liquids without difficulty  Cognition: 0 - no cognition issues reported    Current Level of Function grid:  GG Scale   PT Most Recent Goals for Rehab   Bed Rolling 4 Supervision or touching assitance 6 Independent   Supine to Sit 4 Supervision or touching assitance 6 Independent   Sit to Stand 4 Supervision or touching assitance 6 Independent   Transfer 4 Supervision or touching assitance 6 Independent   Ambulation 4 Supervision or touching assitance 6 Independent   Stairs 0 Not  completed 6 Independent     OT Most Recent Goals for Rehab   Feeding 5 Setup or clean-up assistance 6 Independent   Grooming 4 Supervision or touching assitance 6 Independent   Bathing 3 Partial/moderate assistance 6 Independent   Upper Body Dressing 3 Partial/moderate assistance 6 Independent   Lower Body Dressing 3 Partial/moderate assistance 6 Independent   Toileting 4 Supervision or touching assitance 6 Independent   Toilet Transfer 3 Partial/moderate assistance 6 Independent   Tub/Shower Transfer 0 Not completed 6 Independent   Cognitive Impaired; 9/22 on MoCA blind Pt to improve her cognition so she can safely complete ADLs IND, IADLs w/ supervision and to improve her communication so she can safely make her needs known.      SLP Most Recent Goals for Rehab   Swallowing Impaired; DD3 diet w/ thin liquids Pt will tolerate least restrictive diet.        Summary Statement:  Sheila is motivated and making daily progress. She is currently ambulating 175 ft w/ WW w/ CGA d/t gait instability and balance impairments. She performs bed mobility w/ SBA, STS transfers to WW w/ CGA. She scored 7/24 on Dynamic Gait Index indicating she is at risk for falls. She performs toilet transfers w/ min A and g/h tasks w/ CGA.  She scored 9/22 on MoCA blind (this assessment was completed d/t c/o bilateral and unilateral blurry vision), and scored 7/15 on Memory index score. She had difficulty w/ complex money tasks and demos impaired problem solving during path finding activities. She also reports swelling in her mouth contributing to dysphagia and dysarthria. SLP advanced her to a dysphagia diet 3 and tin liquids. She will benefit from full cognitive-linguistic assessment while admitted to acute inpatient rehab.     Expected Therapies & Services required during Inpatient Rehab Admission  Intensity of Therapy: Patient requires intensive therapies not available in a lesser level of care. Patient is motivated, making gains, and can  tolerate 3 hours of therapy a day.  Physical Therapy: 60 minutes per day, at least 5 days a week for 10 days  Occupational Therapy: 60 minutes per day, at least 5 days a week for 10 days  Speech and Language Therapy: 60 minutes per day, at least 5 days a week for 10 days  Rehabilitation Nursing Needs: Patient requires 24 hour Rehab Nursing to manage wound care, vitals, medication education, carryover of new rehab techniques, care coordination, instruct pt in post-surgical craniotomy precautions, provide safe environment for patient at risk for falls, skin breakdown, and infection; assess craniotomy incision for s/s of infection vs healing; assist w/ nutritional status in patient on DD3 diet at risk for aspiration; and assess blood sugars in pt at risk for steroid induced hyperglycemia.    Precautions/restrictions/special needs:  Precautions: fall precautions and craniotomy precautions  Restrictions: No strenuous exercise or heavy lifting greater than 10 lbs for 4 weeks.  Special Needs: visually impaired (had complained on blurry vision during prior OT session).    Expected Level of Improvement: Patient will achieve a level of mod IND or better w/ all transfers, gait, curb negotiation, ADLs and to have supervision A for IADLs. Pt will also improve her swallowing and speech so she can safely tolerate a least restrictive diet and safely communicate her needs effectively.   Expected Length of time to achieve: 10 days    Anticipated Discharge Needs:  Anticipated Discharge Destination: Home  Anticipated Discharge Support: Family member  24/7 support available : Unknown  Identified caregiver(s):  Pt reports having daughter Nica, son Bulmaro, and son Grant living nearby and available to assist as needed.   Anticipated Discharge Needs: home with outpatient therapy.  Note: Sutures are not absorbable and need to be removed in 2 weeks. If patient still at rehab by this time, the sutures may be removed by the rehab physician if  he or she considers that the wound has healed completely.  Pt to f/u w/ Neurosurgery Clinic in 10-14 days for wound evaluation.  Pt to f/u w/ Dr. Prabhakar in Neurosurgery Clinic in ~3 months.  Pt to f/u with Primary Care Provider to assess blood sugars.     Identified challenges/barriers: cognition.    Liaison Signature:        Physician statement of review and agreement:  I have reviewed and am in agreement of the need for IRF stay to address above functional and medical needs. In addition to above statements address, Patient requires intensive active and ongoing therapeutic intervention and multiple therapies; Patient requires medical supervision; Expected to actively participate in the intensive rehab program; Sufficiently stable to actively participate; Expectation for measurable improvement in functional capacity or adaption to impairments.    PREADMISSION SCREEN COMPLETED BY:    Date:   Time:

## 2020-01-27 NOTE — PLAN OF CARE
Cared for pt from 0700- 2300  Status: Pt here s/p Brainstem cavernoma  post hemorrhage and resected on1/22  Vitals: AVSS, HTN within parameters -during PT pt had BP drop but PT was not sure of exact BP- pt also felt increase in dizziness and  like she was going to pass out ; RN did orthostatics later- see flowsheets; On charge list to update   Neuros: A&O x4, generalized weakness, pt states that she has been dizzy for days now,  numbness and tingling in both hands and BLE from knees down.   IV: PIV SL b/t Abx  Resp/trach: On RA, stable  Diet: DD3 with thins  Bowel status: Pt having smears- states that she cannot feel when she is stooling anymore  : Started on IV Abx- pt having pain with urination and frequency   Skin: Posterior head incision intact with sutures, mild erythema, no drainage noted and NIELS  Pain: C/o of H/A, PRN Tylenol given q 4 hours and helpful  Activity: Assist of 1 with GB and walker  Social:  at bedside and supportive   Plan: Discharge to ARU once placement is found. Will continue to monitor and follow POC.

## 2020-01-27 NOTE — PROGRESS NOTES
SW received a text from [facility] ([place]) asking that SW cancel pt's scheduled transport to [facility] and re-scheduled for tomorrow due to bed availability. SW phoned [facility] ([place]) and re-scheduled transport to take place tomorrow at 2pm. Pt, family, 6A nursing and Anna Holloway NP were updated.    COLLIN Ceballos  Social Work, 6A  Phone:  [phone]  Pager:  [phone]  1/27/2020

## 2020-01-27 NOTE — PROGRESS NOTES
Social Work Services Discharge Note      Patient Name:  Sheila Barraza     Anticipated Discharge Date:  1/27/2020    Discharge Disposition:   [facility] ([phone])    Following MD:  Facility Assignment     Pre-Admission Screening (PAS) online form has been completed.  The Level of Care (LOC) is:  Determined  Confirmation Code is:  Not required as pt is admitting to acute rehab setting.  Patient/caregiver informed of referral to [facility] Line for Pre-Admission Screening for skilled nursing facility (SNF) placement and to expect a phone call post discharge from SNF.     Additional Services/Equipment Arranged:  URBANO confirmed readiness for discharge with Anna Holloway NP Neuro Surgery. SW [facility] (pending outcome of therapy session today to ensure that pt's blood pressures are stable) with Admissions ([place]).  SW arranged for [employer] ([place] [phone]) to provide w/c transport at 2pm.     Patient / Family response to discharge plan:  Pt,  and daughter (Rosaura) voice understanding of the discharge plan and agreement with the discharge plan.     Persons notified of above discharge plan:  Pt,  ([place]), daughter (Rosaura), 6A nursing and Anna Holloway NP.    Staff Discharge Instructions:  Please fax discharge orders and signed hard scripts for any controlled substances.  Please print a packet and send with patient.     CTS Handoff completed:  YES    Medicare Notice of Rights provided to the patient/family:  YES      Additional information.  SW received a voice mail from pt's daughter this am (Rosaura) asking SW to re-refer to [facility] in [place].  URBANO received a voice mail from Admissions ([place] asking SW to fax weekend  Progress notes. SW completed this task.  However, when SW updated pt and family, all indicated that they have decided that they want pt's rehab stay to take place at [facility].      Beatrice Sung  COLLIN  Social Work, 6A  Phone:  [phone]  Pager:  [phone]  1/27/2020

## 2020-01-27 NOTE — PLAN OF CARE
"6A PT  Discharge Planner PT   Patient plan for discharge: ARU  Current status: Pt was up ambulating with RN previously, noted to RN and PT \"heads not there\" with standing. Pt motivated to walk further, up to 5x today per report. Assessed BP with sitting and standing during visit. Pt ambulated with FWW with CGA-SBA, c/o feeling imbalance with gait.   Barriers to return to prior living situation: Medical status, impaired balance, fall risk, weakness, decreased activity tolerance, impaired cognition  Recommendations for discharge: ARU  Rationale for recommendations: Pt is well below baseline in regards to mobility and motivated to return to PLOF. Pt with deficits in balance, strength, and cognition and would benefit from continued skilled intensive interdisciplinary rehab services to address above deficits and progress safety and independence with functional mobility. Pt overall has showed good tolerance for PT, only dizziness improved (very dizzy today) anticipate pt would tolerate 3hrs of therapy per day.       Entered by: Lorraine Frias 01/27/2020 9:57 AM       "

## 2020-01-27 NOTE — PROGRESS NOTES
"S: patient reporting persistent dizziness and difficulty with ambulation. She thinks her dysarthria and swallowing are improving. A plan was made to initiate sub-cu heparin for dvt prophylaxis today but patient refused and opted to pursue ambulation instead.     O:  Temp:  [95.5  F (35.3  C)-97.2  F (36.2  C)] 97.2  F (36.2  C)  Pulse:  [65-87] 65  Resp:  [16] 16  BP: (117-149)/(71-85) 139/71  SpO2:  [96 %-99 %] 98 %    Awake and alert.   Oriented x 4.    Extraocular muscles intact  Dysarthria slightly lessened but persists   Tongue protrusion midline   Baseline absent facial sensation in V1, V2, and V3 on left.   Brow lift symmetric   Tongue edema edema present   Shoulder shrug symmetric  Hearing intact to conversation   5/5 in bilateral upper and lower extremities  Finger nose finger with ataxia en route to target but patient is able to meet target bilaterally   No pronator drift    Sensation grossly intact to light touch.     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 In hospital fall sustained on 1/23 without additional new injuries.     Patient with expected post-surgical course. At this time, patient is pending ongoing PT/OT/SLP evaluations to facilitate ARU placement.     Plan:   - decadron 1 week taper for cerebral edema  - dysphagia diet 3 with thin liquids   - sliding scale insulin while on decadron  - home lisinopril   - protonix while on decadron   - continue home cpap   - meclizine trial for dizziness   - SBP goal < 140  - PT/OT/SLP  Dispo: 6A, ARU when available  DVT prophylaxis: SCDs; hold chemoprophylaxis; ambulate qid      Oakes \"John\" MD [place]   Neurosurgery, PGY-3    Please contact neurosurgery resident on call with questions.    Dial * * *[phone], enter [phone] when prompted.     I have reviewed the history above and agree with the resident's assessment and plan.  JULIAN Prabhakar MD    "

## 2020-01-27 NOTE — PLAN OF CARE
Status: Pt here s/p Brainstem cavernoma post hemorrhage and resected on1/22  Vitals: AVSS on RA, on home CPAP overnight  Neuros: A&O x4, generalized weakness. Slurred speech. Feels dizzy with activity. Numbness and tingling in both hands and BLE from knees down.   IV: PIV SL  Resp/trach: On RA, stable  Diet: DD3 with thins  Bowel status: Soft BM x1, passing gas. On the charge RN list to request for Tums   : Has been reporting pain with urination, postive for UTI, IV abx started yesterday   Skin: Posterior head incision intact with sutures, mild erythema, NIELS  Pain: PRN Tylenol x2 this shift for generalized pain &  comfort  per pt  Activity: A1, GB, and walker  Social: No family overnight   Plan: Discharge to ARU once placement is found. Will continue to monitor and follow POC.

## 2020-01-27 NOTE — PROGRESS NOTES
"SPIRITUAL HEALTH SERVICES  SPIRITUAL ASSESSMENT Progress Note  [facility] ([place]) 6A     Supportive  visit with pt, daughter, and . Pt said she is scheduled to transfer this afternoon to Acute Rehab on [facility]. We talked about both the challenges and opportunities pt will face on ARU, pt said she feels she is a \"hard worker\" and feels positive and hopeful. Pt's Confucianism tod central to her sense of hope and overall outlook on life; she said she would appreciate ongoing  support while on ARU. We ended our visit with a prayer and blessing.    PLAN: I will update ARU unit  regarding pt's request for  support.     Jose De Jesus Lay) Angie Fisher M.Div., [facility]  Staff   Pager [phone]    '  "

## 2020-01-27 NOTE — PROGRESS NOTES
Rehab Admissions:  I met w/ Sheila, her  Calos, and her daughter Nica this morning to discuss [facility]. I instructed them in benefits of acute rehab and goal of regaining her IND w/ mobility and ADLs. Discussed setup/structure of IRF including daily PMR coverage for her medical/rehab needs, skilled PT/OT/SLP, and skilled rehab nursing. Discussed average LOS of 12 days, however, we are anticipating ~1 week stay for her. Answered pt/family's questions. Provided her w/ a brochure and encouraged her to call w/ further questions.     Thank you for the referral, we will continue to follow this patient for post acute placement.     Determination of admission is based upon the patient's need for an intensive, interdisciplinary approach to rehabilitation, their ability to progress, their ability to tolerate intensive therapies, their need for daily physician supervision, their need for twenty four hour nursing assistance, and their ability and willingness to participate in such a program.    [place] Seo CM  Rehab Liaison/  [facility]  1/27/2020    12:11 PM

## 2020-01-28 ENCOUNTER — APPOINTMENT (OUTPATIENT)
Dept: OCCUPATIONAL THERAPY | Facility: CLINIC | Age: 70
DRG: 020 | End: 2020-01-28
Attending: NEUROLOGICAL SURGERY
Payer: MEDICARE

## 2020-01-28 ENCOUNTER — APPOINTMENT (OUTPATIENT)
Dept: SPEECH THERAPY | Facility: CLINIC | Age: 70
DRG: 020 | End: 2020-01-28
Attending: NEUROLOGICAL SURGERY
Payer: MEDICARE

## 2020-01-28 ENCOUNTER — HOSPITAL ENCOUNTER (INPATIENT)
Facility: CLINIC | Age: 70
LOS: 8 days | Discharge: HOME | DRG: 057 | End: 2020-02-05
Attending: PHYSICAL MEDICINE & REHABILITATION | Admitting: PHYSICAL MEDICINE & REHABILITATION
Payer: MEDICARE

## 2020-01-28 VITALS
SYSTOLIC BLOOD PRESSURE: 109 MMHG | HEART RATE: 66 BPM | OXYGEN SATURATION: 98 % | WEIGHT: 127.43 LBS | HEIGHT: 65 IN | RESPIRATION RATE: 16 BRPM | DIASTOLIC BLOOD PRESSURE: 55 MMHG | BODY MASS INDEX: 21.23 KG/M2 | TEMPERATURE: 98 F

## 2020-01-28 DIAGNOSIS — I10 BENIGN ESSENTIAL HYPERTENSION: ICD-10-CM

## 2020-01-28 DIAGNOSIS — Z98.890 STATUS POST CRANIOTOMY: Primary | ICD-10-CM

## 2020-01-28 DIAGNOSIS — Q28.3 CEREBRAL CAVERNOMA: ICD-10-CM

## 2020-01-28 DIAGNOSIS — E78.5 HYPERLIPIDEMIA LDL GOAL <100: ICD-10-CM

## 2020-01-28 DIAGNOSIS — D18.00 CAVERNOMA: ICD-10-CM

## 2020-01-28 LAB
ANION GAP SERPL CALCULATED.3IONS-SCNC: 8 MMOL/L (ref 3–14)
BASOPHILS # BLD AUTO: 0.1 10E9/L (ref 0–0.2)
BASOPHILS NFR BLD AUTO: 0.3 %
BUN SERPL-MCNC: 20 MG/DL (ref 7–30)
CALCIUM SERPL-MCNC: 9.2 MG/DL (ref 8.5–10.1)
CHLORIDE SERPL-SCNC: 102 MMOL/L (ref 94–109)
CO2 SERPL-SCNC: 25 MMOL/L (ref 20–32)
CORTIS SERPL-MCNC: 8.6 UG/DL (ref 4–22)
CREAT SERPL-MCNC: 0.77 MG/DL (ref 0.52–1.04)
DIFFERENTIAL METHOD BLD: ABNORMAL
EOSINOPHIL # BLD AUTO: 0 10E9/L (ref 0–0.7)
EOSINOPHIL NFR BLD AUTO: 0.1 %
ERYTHROCYTE [DISTWIDTH] IN BLOOD BY AUTOMATED COUNT: 12.1 % (ref 10–15)
FSH SERPL-ACNC: 13.9 IU/L
GFR SERPL CREATININE-BSD FRML MDRD: 77 ML/MIN/{1.73_M2}
GH SERPL-MCNC: 0.2 UG/L
GLUCOSE BLDC GLUCOMTR-MCNC: 110 MG/DL (ref 70–99)
GLUCOSE BLDC GLUCOMTR-MCNC: 133 MG/DL (ref 70–99)
GLUCOSE BLDC GLUCOMTR-MCNC: 145 MG/DL (ref 70–99)
GLUCOSE BLDC GLUCOMTR-MCNC: 185 MG/DL (ref 70–99)
GLUCOSE BLDC GLUCOMTR-MCNC: 88 MG/DL (ref 70–99)
GLUCOSE SERPL-MCNC: 131 MG/DL (ref 70–99)
HCT VFR BLD AUTO: 36.1 % (ref 35–47)
HGB BLD-MCNC: 12.9 G/DL (ref 11.7–15.7)
IGF-I BLD-MCNC: 174 NG/ML (ref 24–224)
IMM GRANULOCYTES # BLD: 0.4 10E9/L (ref 0–0.4)
IMM GRANULOCYTES NFR BLD: 2.3 %
LH SERPL-ACNC: 3.3 IU/L
LYMPHOCYTES # BLD AUTO: 1.4 10E9/L (ref 0.8–5.3)
LYMPHOCYTES NFR BLD AUTO: 8 %
MCH RBC QN AUTO: 33.7 PG (ref 26.5–33)
MCHC RBC AUTO-ENTMCNC: 35.7 G/DL (ref 31.5–36.5)
MCV RBC AUTO: 94 FL (ref 78–100)
MONOCYTES # BLD AUTO: 0.7 10E9/L (ref 0–1.3)
MONOCYTES NFR BLD AUTO: 4.4 %
NEUTROPHILS # BLD AUTO: 14.4 10E9/L (ref 1.6–8.3)
NEUTROPHILS NFR BLD AUTO: 84.9 %
NRBC # BLD AUTO: 0 10*3/UL
NRBC BLD AUTO-RTO: 0 /100
PLATELET # BLD AUTO: 224 10E9/L (ref 150–450)
POTASSIUM SERPL-SCNC: 3.6 MMOL/L (ref 3.4–5.3)
PROLACTIN SERPL-MCNC: 9 UG/L (ref 3–27)
RBC # BLD AUTO: 3.83 10E12/L (ref 3.8–5.2)
SODIUM SERPL-SCNC: 135 MMOL/L (ref 133–144)
T4 SERPL-MCNC: 6.5 UG/DL (ref 4.5–13.9)
TSH SERPL DL<=0.005 MIU/L-ACNC: 0.82 MU/L (ref 0.4–4)
WBC # BLD AUTO: 16.9 10E9/L (ref 4–11)

## 2020-01-28 PROCEDURE — 00000146 ZZHCL STATISTIC GLUCOSE BY METER IP

## 2020-01-28 PROCEDURE — 25000131 ZZH RX MED GY IP 250 OP 636 PS 637: Mod: GY | Performed by: NURSE PRACTITIONER

## 2020-01-28 PROCEDURE — 25000132 ZZH RX MED GY IP 250 OP 250 PS 637: Mod: GY | Performed by: STUDENT IN AN ORGANIZED HEALTH CARE EDUCATION/TRAINING PROGRAM

## 2020-01-28 PROCEDURE — 92526 ORAL FUNCTION THERAPY: CPT | Mod: GN | Performed by: SPEECH-LANGUAGE PATHOLOGIST

## 2020-01-28 PROCEDURE — 25000132 ZZH RX MED GY IP 250 OP 250 PS 637: Mod: GY | Performed by: NURSE PRACTITIONER

## 2020-01-28 PROCEDURE — 97162 PT EVAL MOD COMPLEX 30 MIN: CPT | Mod: GP

## 2020-01-28 PROCEDURE — 97530 THERAPEUTIC ACTIVITIES: CPT | Mod: GO

## 2020-01-28 PROCEDURE — 82530 CORTISOL FREE: CPT | Performed by: NEUROLOGICAL SURGERY

## 2020-01-28 PROCEDURE — 36415 COLL VENOUS BLD VENIPUNCTURE: CPT | Performed by: NEUROLOGICAL SURGERY

## 2020-01-28 PROCEDURE — 25000132 ZZH RX MED GY IP 250 OP 250 PS 637: Mod: GY | Performed by: NEUROLOGICAL SURGERY

## 2020-01-28 PROCEDURE — 12800006 ZZH R&B REHAB

## 2020-01-28 PROCEDURE — 84270 ASSAY OF SEX HORMONE GLOBUL: CPT | Performed by: STUDENT IN AN ORGANIZED HEALTH CARE EDUCATION/TRAINING PROGRAM

## 2020-01-28 PROCEDURE — 97535 SELF CARE MNGMENT TRAINING: CPT | Mod: GO

## 2020-01-28 PROCEDURE — 85025 COMPLETE CBC W/AUTO DIFF WBC: CPT | Performed by: NEUROLOGICAL SURGERY

## 2020-01-28 PROCEDURE — 83002 ASSAY OF GONADOTROPIN (LH): CPT | Performed by: STUDENT IN AN ORGANIZED HEALTH CARE EDUCATION/TRAINING PROGRAM

## 2020-01-28 PROCEDURE — 84403 ASSAY OF TOTAL TESTOSTERONE: CPT | Performed by: STUDENT IN AN ORGANIZED HEALTH CARE EDUCATION/TRAINING PROGRAM

## 2020-01-28 PROCEDURE — 84146 ASSAY OF PROLACTIN: CPT | Performed by: STUDENT IN AN ORGANIZED HEALTH CARE EDUCATION/TRAINING PROGRAM

## 2020-01-28 PROCEDURE — 83001 ASSAY OF GONADOTROPIN (FSH): CPT | Performed by: STUDENT IN AN ORGANIZED HEALTH CARE EDUCATION/TRAINING PROGRAM

## 2020-01-28 PROCEDURE — 82533 TOTAL CORTISOL: CPT | Performed by: STUDENT IN AN ORGANIZED HEALTH CARE EDUCATION/TRAINING PROGRAM

## 2020-01-28 PROCEDURE — 84443 ASSAY THYROID STIM HORMONE: CPT | Performed by: STUDENT IN AN ORGANIZED HEALTH CARE EDUCATION/TRAINING PROGRAM

## 2020-01-28 PROCEDURE — 80048 BASIC METABOLIC PNL TOTAL CA: CPT | Performed by: STUDENT IN AN ORGANIZED HEALTH CARE EDUCATION/TRAINING PROGRAM

## 2020-01-28 PROCEDURE — 84305 ASSAY OF SOMATOMEDIN: CPT | Performed by: STUDENT IN AN ORGANIZED HEALTH CARE EDUCATION/TRAINING PROGRAM

## 2020-01-28 PROCEDURE — 84436 ASSAY OF TOTAL THYROXINE: CPT | Performed by: STUDENT IN AN ORGANIZED HEALTH CARE EDUCATION/TRAINING PROGRAM

## 2020-01-28 PROCEDURE — 83003 ASSAY GROWTH HORMONE (HGH): CPT | Performed by: STUDENT IN AN ORGANIZED HEALTH CARE EDUCATION/TRAINING PROGRAM

## 2020-01-28 RX ORDER — DIPHENHYDRAMINE HYDROCHLORIDE AND LIDOCAINE HYDROCHLORIDE AND ALUMINUM HYDROXIDE AND MAGNESIUM HYDRO
10 KIT EVERY 6 HOURS PRN
Status: ON HOLD | DISCHARGE
Start: 2020-01-28 | End: 2020-02-04

## 2020-01-28 RX ORDER — PANTOPRAZOLE SODIUM 40 MG/1
40 TABLET, DELAYED RELEASE ORAL
Status: DISCONTINUED | OUTPATIENT
Start: 2020-01-29 | End: 2020-02-05 | Stop reason: HOSPADM

## 2020-01-28 RX ORDER — CALCIUM CARBONATE 500 MG/1
1000 TABLET, CHEWABLE ORAL 3 TIMES DAILY PRN
Status: DISCONTINUED | OUTPATIENT
Start: 2020-01-28 | End: 2020-02-05 | Stop reason: HOSPADM

## 2020-01-28 RX ORDER — ACETAMINOPHEN 325 MG/1
325-650 TABLET ORAL EVERY 4 HOURS PRN
Status: DISCONTINUED | OUTPATIENT
Start: 2020-01-28 | End: 2020-02-05 | Stop reason: HOSPADM

## 2020-01-28 RX ORDER — NICOTINE POLACRILEX 4 MG
15-30 LOZENGE BUCCAL
Status: DISCONTINUED | OUTPATIENT
Start: 2020-01-28 | End: 2020-01-28

## 2020-01-28 RX ORDER — DIPHENHYDRAMINE HYDROCHLORIDE AND LIDOCAINE HYDROCHLORIDE AND ALUMINUM HYDROXIDE AND MAGNESIUM HYDRO
10 KIT EVERY 6 HOURS PRN
Status: DISCONTINUED | OUTPATIENT
Start: 2020-01-28 | End: 2020-02-05 | Stop reason: HOSPADM

## 2020-01-28 RX ORDER — VITAMIN B COMPLEX
1000 TABLET ORAL DAILY
Status: DISCONTINUED | OUTPATIENT
Start: 2020-01-29 | End: 2020-02-05 | Stop reason: HOSPADM

## 2020-01-28 RX ORDER — DEXTROSE MONOHYDRATE 25 G/50ML
25-50 INJECTION, SOLUTION INTRAVENOUS
Status: DISCONTINUED | OUTPATIENT
Start: 2020-01-28 | End: 2020-01-28

## 2020-01-28 RX ORDER — LISINOPRIL 20 MG/1
20 TABLET ORAL DAILY
Status: DISCONTINUED | OUTPATIENT
Start: 2020-01-29 | End: 2020-02-05 | Stop reason: HOSPADM

## 2020-01-28 RX ORDER — MECLIZINE HCL 12.5 MG 12.5 MG/1
12.5 TABLET ORAL 3 TIMES DAILY PRN
Status: DISCONTINUED | OUTPATIENT
Start: 2020-01-28 | End: 2020-02-05 | Stop reason: HOSPADM

## 2020-01-28 RX ORDER — DEXTROSE MONOHYDRATE 25 G/50ML
25-50 INJECTION, SOLUTION INTRAVENOUS
Status: DISCONTINUED | OUTPATIENT
Start: 2020-01-28 | End: 2020-02-05 | Stop reason: HOSPADM

## 2020-01-28 RX ORDER — ATORVASTATIN CALCIUM 20 MG/1
20 TABLET, FILM COATED ORAL EVERY EVENING
Status: DISCONTINUED | OUTPATIENT
Start: 2020-01-28 | End: 2020-02-05 | Stop reason: HOSPADM

## 2020-01-28 RX ORDER — DEXAMETHASONE 1 MG
1 TABLET ORAL DAILY
Status: COMPLETED | OUTPATIENT
Start: 2020-01-29 | End: 2020-02-04

## 2020-01-28 RX ORDER — DEXAMETHASONE 1 MG
1 TABLET ORAL DAILY
Status: DISCONTINUED | OUTPATIENT
Start: 2020-01-28 | End: 2020-01-28

## 2020-01-28 RX ORDER — DEXAMETHASONE 2 MG/1
2 TABLET ORAL EVERY 12 HOURS
Status: DISCONTINUED | OUTPATIENT
Start: 2020-01-28 | End: 2020-01-28

## 2020-01-28 RX ORDER — NICOTINE POLACRILEX 4 MG
15-30 LOZENGE BUCCAL
Status: DISCONTINUED | OUTPATIENT
Start: 2020-01-28 | End: 2020-02-05 | Stop reason: HOSPADM

## 2020-01-28 RX ADMIN — MECLIZINE 12.5 MG: 12.5 TABLET ORAL at 14:07

## 2020-01-28 RX ADMIN — ACETAMINOPHEN 650 MG: 325 TABLET, FILM COATED ORAL at 03:37

## 2020-01-28 RX ADMIN — MELATONIN 1000 UNITS: at 07:57

## 2020-01-28 RX ADMIN — DEXAMETHASONE 1 MG: 1 TABLET ORAL at 07:57

## 2020-01-28 RX ADMIN — ATORVASTATIN CALCIUM 20 MG: 20 TABLET, FILM COATED ORAL at 22:04

## 2020-01-28 RX ADMIN — CALCIUM CARBONATE (ANTACID) CHEW TAB 500 MG 1000 MG: 500 CHEW TAB at 00:04

## 2020-01-28 RX ADMIN — ACETAMINOPHEN 650 MG: 325 TABLET, FILM COATED ORAL at 12:44

## 2020-01-28 RX ADMIN — MECLIZINE 12.5 MG: 12.5 TABLET ORAL at 07:57

## 2020-01-28 RX ADMIN — LISINOPRIL 20 MG: 20 TABLET ORAL at 07:57

## 2020-01-28 RX ADMIN — CALCIUM CARBONATE (ANTACID) CHEW TAB 500 MG 1000 MG: 500 CHEW TAB at 12:44

## 2020-01-28 RX ADMIN — CALCIUM CARBONATE (ANTACID) CHEW TAB 500 MG 1000 MG: 500 CHEW TAB at 17:22

## 2020-01-28 RX ADMIN — PANTOPRAZOLE SODIUM 40 MG: 40 TABLET, DELAYED RELEASE ORAL at 07:57

## 2020-01-28 RX ADMIN — ACETAMINOPHEN 650 MG: 325 TABLET, FILM COATED ORAL at 18:42

## 2020-01-28 RX ADMIN — ACETAMINOPHEN 650 MG: 325 TABLET, FILM COATED ORAL at 07:57

## 2020-01-28 ASSESSMENT — ACTIVITIES OF DAILY LIVING (ADL)
DRESS: 0-->INDEPENDENT
AMBULATION: 0-->INDEPENDENT
ADLS_ACUITY_SCORE: 14
SWALLOWING: 0-->SWALLOWS FOODS/LIQUIDS WITHOUT DIFFICULTY
BATHING: 0-->INDEPENDENT
TOILETING: 0-->INDEPENDENT
RETIRED_COMMUNICATION: 0-->UNDERSTANDS/COMMUNICATES WITHOUT DIFFICULTY
RETIRED_EATING: 0-->INDEPENDENT
COGNITION: 0 - NO COGNITION ISSUES REPORTED
TRANSFERRING: 0-->INDEPENDENT
ADLS_ACUITY_SCORE: 14
FALL_HISTORY_WITHIN_LAST_SIX_MONTHS: NO

## 2020-01-28 ASSESSMENT — MIFFLIN-ST. JEOR: SCORE: 1025.04

## 2020-01-28 NOTE — PLAN OF CARE
Discharge Planner OT   Patient plan for discharge: ARU    Current status: Pt ambulated with CGA and FWW x 250 ft, slightly unsteady at times when distracted.  CGA for mobility into bathroom. Min A to doff clothing in stance, pt would not sit down to doff clothing. Min A for shower transfer due to impaired balance. OT reviewed crani precautions. Pt able to complete TB bathing with supervision and CGA while standing.  Pt able to don socks, pants and underwear with figure four technique to maintain precautions. completed g/h tasks sitting EOB with set- up A.     Barriers to return to prior living situation: cognition, vision, balance, pt main caregiver for spouse, generalized weakness    Recommendations for discharge: ARU - prefers location by [facility]    Rationale for recommendations: Pt would benefit from intensive OT services to improve IND with ADL's and transfers as pt was IND prior. Pt will be able to tolerate 3 hours of therapies. Pt motivated to participate in therapy and has good family support. Pt would make good ARC candidate at this time.          Entered by: Sophy Perez 01/28/2020 9:18 AM     Occupational Therapy Discharge Summary    Reason for therapy discharge:    Discharged to acute rehabilitation facility.    Progress towards therapy goal(s). See goals on [facility] record for goal details.  Goals not met.  Barriers to achieving goals:   discharge from facility.    Therapy recommendation(s):    Continued therapy is recommended.  Rationale/Recommendations:  see above.

## 2020-01-28 NOTE — PLAN OF CARE
Status: s/p Brainstem cavernoma post hemorrhage and craniotomy on1/22  Vitals: AVSS on home CPAP overnight  Neuros: A&O x4, generalized weakness. Slurred speech. Feels dizzy with activity. Numbness and tingling in both hands and BLE from knees down.   IV: No PIV, MDs okay  Resp/trach: LS clear  Diet: DD2 with nectar thick  Bowel status: BM x1   : Reporting pain with urination, positive for UTI, on abx   Skin: Posterior head incision intact with sutures, mild erythema, NIESL  Pain: PRN Tylenol x1 for generalized discomfort  Activity: A1, GB, and walker. High fall risk, recent fall on 4A  Social: No family overnight   Plan: Discharge to  ARU around 1400 per report. Will continue to monitor and follow POC.

## 2020-01-28 NOTE — PLAN OF CARE
Discharge Planner SLP   Patient plan for discharge: ARU  Current status: Patient seen for dysphagia treatment. Patient reports difficulty chewing French toast, dislike for thickened liquids and preference for smooth textured foods due to difficulty with tongue movement. Note immediate throat clearing after bites of semi-solid texture and occasional delayed throat clear after sips of thin water or pudding consistency. Questions potential for pharyngeal residue. Patient demonstrating overall improved tolerance of thin liquids this date compared to yesterday. Question if fatigue affecting swallowing performance over course of meal or snack.     Recommend change diet textures to puree (dysphagia diet level 1) and liquids to thin consistency given 1:1 supervision and consistent use of swallow strategies (fully upright position, no straws, small single sips/bites, alternate food/liquid every bite, rest breaks as needed). Monitor closely for increased/persistent signs/symptoms of aspiration and hold PO if occur. May consider video swallow study to further assess oropharyngeal swallow function given inconsistent signs of aspiration during sessions.  Barriers to return to prior living situation: medical needs; weakness; dysphagia  Recommendations for discharge: ARU  Rationale for recommendations: continued SLP for dysphagia for safe return to baseline or least restrictive diet       Entered by: Yue Thrasher 01/28/2020 11:08 AM

## 2020-01-28 NOTE — PLAN OF CARE
Status: Pt on 6A brainstem cavernoma status post hemorrhage now resected via suboccipital craniotomy on 1/22/20. In hospital fall sustained on 1/23 without additional new injuries, per MD note.  Vitals: VSS on RA.   Neuros: A&Ox4. 5/5 all extremities. BUE N/T to hands. BLE N/T knees to toes. L facial numbness per pt baseline. Intermittent dizziness w/ walking. Denies blurry, double vision or photophobia.  IV: No PIV, okay by MD.  Resp/trach: LS clear all fields. Stable on RA.   Diet: DD1 w/ thins. Takes meds whole w/ water.  Bowel status: BS active all quads. Last Bm:1/28/20.  : Voids spontaneously. UA sent down earlier this morning.  Skin: Posterior head incision, NIELS, CDI.   Pain: PRN Tylenol given per pt request though she states she's not in pain. Ice pack applied to head incision.  Activity: Up w/ 1 GB and walker.  Social: No visitors this shift.  Plan: RN gave report to  ARU nurse. Pt discharged off unit at 1410 via wheelchair.

## 2020-01-28 NOTE — PLAN OF CARE
PT: Eval initiated, not completed, d/t provider needing to speak with patient and pt fatigue. Eval to be completed tomorrow.     15 min total

## 2020-01-28 NOTE — H&P
[facility]   Acute Rehabilitation Unit  Admission History and Physical    CHIEF COMPLAINT   S/p sub-occipital craniotomy    HISTORY OF PRESENT ILLNESS  Sheila Barraza is a 69 year old right hand dominant female with a history of trigeminal neuralgia who presented to an outside hospital with concern of stroke-like symptoms in the setting of a known brainstem cavernoma.       The patient had reported inability to stand, wobbly gait, dysequilibrium, right arm and left paresthesia, feeling generally weak, and inability to walk to an outside hospital beginning at 0900 on 1/13/20  The patient underwent head CT that revealed a concern of hyperdensity in the pontomedullary junction.  Of note, the patient had actually presented to her local hospital on 12/23/2019, with similar symptoms.  The patient had also findings of a hyperdense lesion at that time at the pontomedullary junction.  She had intended to followup at the [facility] with her neurosurgeon regarding this lesion and for management of her trigeminal neuralgia.  Due to concern for recurrent symptoms, the patient was transferred down to the [facility] for further evaluation and treatment of a concern of a cerebral brainstem cavernoma.       The patient states that her cavernoma is known to have existed since at least 2015.  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 Of note, the patient has undergone 2 balloon rhizotomies at the [facility], on the left side.  She states that at this time, her deficits include imbalance with ambulation and dizziness.  She also has numbness in the left side of the face, though this is baseline since her rhizotomies.  She has been having difficulty swallowing and difficulty singing.  She denies blurry vision.  She further thinks that her strength is without deficit.   The patient previously had numbness and tingling in both arms; however, this has subsided.      Of note, the patient had had planned to be evaluated at the [facility] on 01/14/2020, but was unable to present due to this episode on 01/13/2020.     During his acute hospitalization, patient was seen and evaluated by  PT, OT, SLP and PM&R consult service.  All specialties collectively recommended that patient would benefit from ongoing therapies in the acute inpatient rehabilitation setting.      In review of the therapy notes: The patient is ambulating 250 ft w/ FWW w/ CGA d/t gait instability and balance impairments. She performs bed mobility w/ SBA, STS transfers to WW w/ CGA.CGA for mobility into bathroom. Min A to doff clothing in stance, pt would not sit down to doff clothing. Min A for shower transfer due to impaired balance. Pt able to complete TB bathing with supervision and CGA while standing.  Pt able to don socks, pants and underwear with figure four technique to maintain precautions. completed g/h tasks sitting EOB with set- up A.   She scored 7/24 on Dynamic Gait Index indicating she is at risk for falls. She performs toilet transfers w/ min A and g/h tasks w/ CGA.  She scored 9/22 on MoCA blind (this assessment was completed d/t c/o bilateral and unilateral blurry vision), and scored 7/15 on Memory index score. She had difficulty w/ complex money tasks and demos impaired problem solving during path finding activities. She also reports swelling in her mouth contributing to dysphagia and dysarthria.     Currently, the patient is medically appropriate and is assessed to have needs and will benefit from an inpatient acute rehabilitation comprehensive program working with PT, OT and SLP, and will also benefit from supervision and management of Rehab Nursing and Rehab MD.       PAST MEDICAL HISTORY  Past Medical History:   Diagnosis Date     Dyslipidemia      Hypertension        SURGICAL HISTORY  Past Surgical  History:   Procedure Laterality Date     CRANIOTOMY, SUBOCCIPITAL N/A 1/22/2020    Procedure: STEALTH GUIDED SUBOCCIPITAL CRANIOTOMY FOR RESECTION OF CAVERNOUS MALFORMATION WITH NEUROMONITORING;  Surgeon: Catarina Prabhakar MD;  Location: [facility] OR     HERNIA REPAIR         SOCIAL HISTORY  Marital Status:   Living situation: Living in a town home with spouse, Calos,  Who has Wegener granulomatosis.  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 Also, walked 2-4 miles a day.  Pt cares for spouse (dressing changes, drives). Spouse safe to be home alone for periods of time. Pt was very active prior to onset of sx.       MEDICATIONS    Medications Prior to Admission   Medication Sig Dispense Refill Last Dose     acetaminophen (TYLENOL) 325 MG tablet 1-2 tablets (325-650 mg) by Oral or Feeding Tube route every 4 hours as needed for mild pain, fever or headaches        atorvastatin (LIPITOR) 20 MG tablet Take 1 tablet (20 mg) by mouth every evening        calcium carbonate (TUMS) 500 MG chewable tablet Take 2 tablets (1,000 mg) by mouth 3 times daily as needed for heartburn        cinnamon 500 MG TABS Take 1,000 mg by mouth daily   Unknown at Unknown     clonazePAM (KLONOPIN) 0.5 MG tablet Take 1 tablet (0.5 mg) by mouth 2 times daily as needed for anxiety        dexamethasone (DECADRON) 1 MG tablet Take 1 tablet (1 mg) by mouth daily for 1 dose        dexamethasone (DECADRON) 2 MG tablet Take 1 tablet (2 mg) by mouth every 12 hours for 1 dose        lisinopril (PRINIVIL/ZESTRIL) 20 MG tablet  Take 1 tablet (20 mg) by mouth daily        magic mouthwash suspension, diphenhydrAMINE, lidocaine, aluminum-magnesium & simethicone, (FIRST-MOUTHWASH BLM) compounding kit Swish and swallow 10 mLs in mouth every 6 hours as needed for mouth sores        meclizine (ANTIVERT) 12.5 MG tablet Take 1 tablet (12.5 mg) by mouth 3 times daily as needed for dizziness        methylcellulose (CITRUCEL) 500 MG TABS tablet Take 1 tablet (500 mg) by mouth daily        pantoprazole (PROTONIX) 40 MG EC tablet Take 1 tablet (40 mg) by mouth every morning (before breakfast)        Vitamin D, Cholecalciferol, 25 MCG (1000 UT) TABS Take 1,000 Units by mouth daily   Unknown at Unknown       ALLERGIES     Allergies   Allergen Reactions     Beta Adrenergic Blockers      Lasix [Furosemide]      Nuts      Phenol Other (See Comments)     Sulfa Drugs      Tegretol [Carbamazepine]          REVIEW OF SYSTEMS  A 10 point ROS was performed and negative unless otherwise noted in HPI.     Constitutional: denies any fevers, chills.   Eyes: blurry vision since surgery, wear glass  Ears, Nose, Throat:  difficulty swallowing, got downgraded to DD1 with thin   Cardiovascular: denies any exertional chest pain or palpitation   Respiratory: denies dyspnea   Gastrointestinal: denies any nausea, vomiting, abdominal pain, diarrhea or constipation.  Since surgery, no sensation  When needing to defecate.   Genitourinary: denies any dysuria, hematuria, having  frequency or urgency since surgery no sensation to urinate,  UA on 1/26 was result neg  Musculoskeletal: denies any muscle pain, joint pain, neck pain or back pain   Neurologic: denies any headache.  Bilateral knees  down to feet changes in motor or sensory function since surgery,  loss of balance/ dizziness which is worse with movement.   Psychiatric: denies mood changes; sleeps OK    Skin: posterior head-surgical site, sutures  C/D/I      PHYSICAL EXAM  VITAL SIGNS:  /69   Pulse 91   Temp 96.2  F  "(35.7  C) (Oral)   Resp 18   Ht 1.537 m (5' 0.5\")   Wt 58.1 kg (128 lb)   LMP  (LMP Unknown)   SpO2 98%   BMI 24.59 kg/m    BMI:  Estimated body mass index is 24.59 kg/m  as calculated from the following:    Height as of this encounter: 1.537 m (5' 0.5\").    Weight as of this encounter: 58.1 kg (128 lb).     General: NAD, pleasant and cooperative   HEENT:  oropharynx clear with MMM, EOMI, PERRL. numbness of left side of face (Hx trigeminal neuralgia) , Suture C/D/I   Pulmonary: clear breath sounds b/l, no rales/wheezing    Cardiovascular: RRR, no murmur   Abdominal: soft, tender to touch in lower abd which has been since surgery, non-distended  Extremities: warm, well perfused, no edema in bilateral lower extremities, no tenderness in calves     MSK/neuro:   Mental Status:  alert and oriented x3    Cranial Nerves: grossly normal   1. 2nd CN: Pupils equal, round, reactive to light and accomodation. and visual fields intact to confrontation.   2. 3rd,4th,6th CN:  EOMI, appropriate pupillary responses  3. 5th CN: facial sensation on right, numbness on left    4. 7th CN: face asymmetrical   5. 8th CN: functional hearing bilaterally  6. 9th, 10th CN: palate elevates symmetrically   7. 11th CN: sternocleidomastoids and trapezii strong   8. 12th CN: tongue midline and without fasciculations     Sensory: diminished to light touch at both hands R>L and bilateral lower extremities from knee down to toes    Strength: 5/5 in all muscle groups of the upper and lower extremities    Reflexes: Present and symmetrical    Abnormal movements: None    Coordination: No dysmetria on finger to nose b/l    Speech: aphasia, dysarthria    Cognition: slow respond   Gait: deferred to therapy to asses    Skin: anterior foot bruising     LABS  Pertinent labs reviewed    Lab Results   Component Value Date    WBC 16.9 (H) 01/28/2020    HGB 12.9 01/28/2020    HCT 36.1 01/28/2020    MCV 94 01/28/2020     01/28/2020     Lab Results "   Component Value Date     01/28/2020    POTASSIUM 3.6 01/28/2020    CHLORIDE 102 01/28/2020    CO2 25 01/28/2020     (H) 01/28/2020     Lab Results   Component Value Date    GFRESTIMATED 77 01/28/2020    GFRESTBLACK 89 01/28/2020     Lab Results   Component Value Date    AST 12 01/14/2020    ALT 25 01/14/2020    ALKPHOS 60 01/14/2020    BILITOTAL 0.4 01/14/2020     Lab Results   Component Value Date    INR 1.00 01/14/2020     Lab Results   Component Value Date    BUN 20 01/28/2020    CR 0.77 01/28/2020         ASSESSMENT/PLAN:  Sheila Barraza is a 69 year old right hand dominant female with brainstem cavernoma status post hemorrhage now resected via suboccipital craniotomy on 1/22/20. PMHx HTN, IBS, HLD, trigeminal neuralgia s/p two rhizotomies at [facility] on the left side, inguinal hernia repair, and JIMENEZ.  Hospital course complicated by  a fall.  Admitted to acute inpatient rehab 01/28/20      Impairment group code: 01.4 No Paresis Stroke: pontomedullary ICH + suboccipital craniotomy for resection of brainstem cavernous malformation      1. PT, OT and SLP 60 minutes of each on a daily basis, in addition to rehab nursing and close management of physiatrist.      2. Impairment of ADL's:  OT for 60 min daily to work on upper and lower body self care, dressing, toileting, bathing, energy conservation techniques with use of ADs as needed.     3. Impairment of mobility:   PT for 60 min daily to work on gait exercises, strengthening, endurance buildup, transfers with use of walker as needed.     4. Impairment of cognition/language/swallow:   SLP for 60 min daily for cognitive evaluation and treatment strategies for higher level cognitive deficits and memory impairment.     5. Rehab RN to administer medication, patient education on medication taking, VS monitoring, bowel regimen, glucose monitoring and wound care/surgical wound dressing changes and monitoring. .    1. Medical Conditions  #Pontomeduallry  ICH, ICH Score = 1, NIHSS = 0  #Brainstem cavernoma s/p resection vis suboccipital craniotomy 1/22/20  - Decadron, taper over one week, sliding scale insulin while on decadron  - SBP Goal < 140    - wound care; ok to remove sutures in 2 weeks  - leukocytosis most likely due to steroids and demargination; will monitor clinically and repeat labs as needed   - PT/OT/Speech      #Trigeminal Neuralgia  Prior balloon rhizotomies x2 at the [facility], on the left side. Left sensory deficit      #HLD  -Atorvastatin 20 mg daily     # Hypertension  - SBP goals of < 140   - lisinopril 20 mg daily    #Wound Care: posterior head, S/P craniotomy,  Sutures are not absorbable and need to be removed in 2 weeks. If patient still at rehab by this time, the sutures may be removed by the rehab physician,if considers that the wound has healed completely. Sutures should be removed on post-operative day #14 (2/5/2020).  -monitor for infection      #JIMENEZ  Pt has been using CPAP for ~7 years   - Continue home home CPAP    # IBS   - PRN bowel regimen and scheduled Citrucel     # Neurogenic bowel and bladder: started after surgery. no sensation when defecating or urinating. She has impaired sensation at genital area and buttocks.   - UA on 1/26 due to urgency with result neg     # H/o Fall on POD #1 the patient fell and hit her head while attempted to clean herself on the toilet. A Head CT and C-Spine CT were obtained and were negative for acute pathology       2. Adjustment to disability:  Clinical psychology to eval and treat  3. FEN: DD1 diet w/ thin liquids  4. Bowel: incontinency. No sensation  5. Bladder: PVR check, no sensation   6. DVT Prophylaxis: , declined heparin, wants to walk.  7. GI Prophylaxis: protonix  8. Code: Full  9. Disposition: home   10. ELOS:  10 days.  11. Rehab prognosis:  anticipate progression  12. Follow up Appointments on Discharge:    -Neurosurgery Clinic in 10-14 days for wound evaluation.   -   [facility] in Neurosurgery Clinic in ~3 months.prior Once discharged from rehab but prior to returning to [place].  Please page Anna Holloway CNP @ [phone] or [phone] once discharge is anticipated for coordination of appointment scheduling   - Primary Care Provider in 7 days        Mark Ashby CNP  Physical Medicine & Rehabilitation        Physician Attestation   I, Waleska Reaves, saw and evaluated Sheila Barraza as part of a shared visit.  I have reviewed and discussed with the advanced practice provider their history, physical and plan.    I personally reviewed the vital signs, medications, labs and imaging.    My key history or physical exam findings:   Key management decisions made by me:   Izabel Barraza is a 70 yo female with brainstem cavernoma status post hemorrhage now resected via suboccipital craniotomy on 20.     Comorbid medical conditions being managed: wound care, pain, HTN, HLD, JIMENEZ, IBS, and h/o trigeminal neuralgia.      She was I with all aspects of her life prior to admission. Functional deficits include dysphagia, dysarthria, paresthesia in hands R>L and bilateral lower extremities (from knee down), imbalance, dizziness, neurogenic bladder and bowel. Currently requires CGA to min A for transfers, mobility and ADLs. Needs full evaluation of cognitive deficits.      Anticipate improvement to mod I level with mobility and ADLs. Might need supervision with iADLs pending her cognitive deficits.      Will benefit from intensive rehabilitation includin minutes each of PT, OT and SLP  Rehabilitation nursing  Close management by physiatry     Good medical and rehab prognosis. Estimated length of stay is 10 days      Waleska Reaves  Date of Service (when I saw the patient): 20

## 2020-01-28 NOTE — PLAN OF CARE
Status: Pt is on 6A for craniotomy on 1/22  Vitals: HTN w/in parameters  Neuros: A&O x4, strengths 5/5, complained of N/T in both of her hands and BLE from knees down  IV: No PIV, MDs ok with that  Resp/trach: Lung sounds are clear bilaterally  Diet: DD2, tolerating well  Bowel status: Bowel sounds are active, no BM this shift  : Voiding spontaneously  Skin: Head incision is WDL and NIELS  Pain: Complained of a slight HA with some relief w/ PRN tylenol  Activity: SBA - Assist x1 w/ GB and walker  Social: Family was present earlier in the shift and were supportive in her cares  Plan: Will continue to monitor and follow POC, plan for discharge tomorrow

## 2020-01-28 NOTE — PROGRESS NOTES
"S: no events overnight. Diet downgraded to dysphagia 2 yesterday     O:  Temp:  [96.1  F (35.6  C)-97.4  F (36.3  C)] 97.1  F (36.2  C)  Pulse:  [69-82] 69  Heart Rate:  [76] 76  Resp:  [16] 16  BP: (101-136)/(46-75) 114/59  SpO2:  [95 %-98 %] 98 %    Awake and alert.   Oriented x 4.    Extraocular muscles intact  Dysarthria present   Tongue protrusion midline   Baseline absent facial sensation in V1, V2, and V3 on left.   Brow lift symmetric   Tongue edema edema present   Shoulder shrug symmetric  Hearing intact to conversation   5/5 in bilateral upper and lower extremities  Finger nose finger with ataxia en route to target but patient is able to meet target bilaterally   No pronator drift    Sensation grossly intact to light touch.   Posterior scalp incision is clean/dry/intact with nylon sutures in place. No pseudomeningocele evident     Assessment:   Brainstem cavernoma status post hemorrhage now resected via suboccipital craniotomy on 1/22/20. In hospital fall sustained on 1/23 without additional new injuries.     Patient with expected post-surgical course. At this time, patient is pending placement to facility     Plan:   Anticipate discharge at 14:00 today   - decadron 1 week taper for cerebral edema  - dysphagia diet 2 with nectar liquids   - sliding scale insulin while on decadron  - home lisinopril   - protonix while on decadron   - continue home cpap   - meclizine   - SBP goal < 140  - PT/OT/SLP  Dispo: 6A, ARU when available  DVT prophylaxis: SCDs; hold chemoprophylaxis; ambulate qid      Oakes \"John\" MD [place]   Neurosurgery, PGY-3    Please contact neurosurgery resident on call with questions.    Dial * * [phone], enter [phone] when prompted.     I have reviewed the history. I have seen and examined the patient myself and agree with the assessment and plan above.  JULIAN Prabhakar MD    "

## 2020-01-29 ENCOUNTER — APPOINTMENT (OUTPATIENT)
Dept: SPEECH THERAPY | Facility: CLINIC | Age: 70
End: 2020-01-29
Payer: MEDICARE

## 2020-01-29 ENCOUNTER — APPOINTMENT (OUTPATIENT)
Dept: PHYSICAL THERAPY | Facility: CLINIC | Age: 70
End: 2020-01-29
Payer: MEDICARE

## 2020-01-29 ENCOUNTER — APPOINTMENT (OUTPATIENT)
Dept: OCCUPATIONAL THERAPY | Facility: CLINIC | Age: 70
End: 2020-01-29
Attending: NURSE PRACTITIONER
Payer: MEDICARE

## 2020-01-29 LAB
GLUCOSE BLDC GLUCOMTR-MCNC: 103 MG/DL (ref 70–99)
GLUCOSE BLDC GLUCOMTR-MCNC: 111 MG/DL (ref 70–99)
GLUCOSE BLDC GLUCOMTR-MCNC: 111 MG/DL (ref 70–99)
GLUCOSE BLDC GLUCOMTR-MCNC: 150 MG/DL (ref 70–99)
SHBG SERPL-SCNC: 17 NMOL/L (ref 30–135)
TESTOST FREE SERPL-MCNC: 0.1 NG/DL (ref 0.06–0.38)
TESTOST SERPL-MCNC: 4 NG/DL (ref 8–60)

## 2020-01-29 PROCEDURE — 25000131 ZZH RX MED GY IP 250 OP 636 PS 637: Mod: GY | Performed by: NURSE PRACTITIONER

## 2020-01-29 PROCEDURE — 97129 THER IVNTJ 1ST 15 MIN: CPT | Mod: GN | Performed by: SPEECH-LANGUAGE PATHOLOGIST

## 2020-01-29 PROCEDURE — 97130 THER IVNTJ EA ADDL 15 MIN: CPT | Mod: GN | Performed by: SPEECH-LANGUAGE PATHOLOGIST

## 2020-01-29 PROCEDURE — 00000146 ZZHCL STATISTIC GLUCOSE BY METER IP

## 2020-01-29 PROCEDURE — 97166 OT EVAL MOD COMPLEX 45 MIN: CPT | Mod: GO | Performed by: OCCUPATIONAL THERAPIST

## 2020-01-29 PROCEDURE — 12800006 ZZH R&B REHAB

## 2020-01-29 PROCEDURE — 25000132 ZZH RX MED GY IP 250 OP 250 PS 637: Mod: GY | Performed by: NURSE PRACTITIONER

## 2020-01-29 PROCEDURE — 92522 EVALUATE SPEECH PRODUCTION: CPT | Mod: GN | Performed by: SPEECH-LANGUAGE PATHOLOGIST

## 2020-01-29 PROCEDURE — 97535 SELF CARE MNGMENT TRAINING: CPT | Mod: GO | Performed by: OCCUPATIONAL THERAPIST

## 2020-01-29 PROCEDURE — 97116 GAIT TRAINING THERAPY: CPT | Mod: GP | Performed by: STUDENT IN AN ORGANIZED HEALTH CARE EDUCATION/TRAINING PROGRAM

## 2020-01-29 PROCEDURE — 97163 PT EVAL HIGH COMPLEX 45 MIN: CPT | Mod: GP | Performed by: STUDENT IN AN ORGANIZED HEALTH CARE EDUCATION/TRAINING PROGRAM

## 2020-01-29 PROCEDURE — 97530 THERAPEUTIC ACTIVITIES: CPT | Mod: GP | Performed by: STUDENT IN AN ORGANIZED HEALTH CARE EDUCATION/TRAINING PROGRAM

## 2020-01-29 RX ADMIN — ACETAMINOPHEN 650 MG: 325 TABLET, FILM COATED ORAL at 20:10

## 2020-01-29 RX ADMIN — ACETAMINOPHEN 650 MG: 325 TABLET, FILM COATED ORAL at 01:11

## 2020-01-29 RX ADMIN — ATORVASTATIN CALCIUM 20 MG: 20 TABLET, FILM COATED ORAL at 20:10

## 2020-01-29 RX ADMIN — ACETAMINOPHEN 650 MG: 325 TABLET, FILM COATED ORAL at 11:52

## 2020-01-29 RX ADMIN — DEXAMETHASONE 1 MG: 1 TABLET ORAL at 07:57

## 2020-01-29 RX ADMIN — MECLIZINE HCL 12.5 MG 12.5 MG: 12.5 TABLET ORAL at 10:13

## 2020-01-29 RX ADMIN — MELATONIN 1000 UNITS: at 07:57

## 2020-01-29 RX ADMIN — ACETAMINOPHEN 650 MG: 325 TABLET, FILM COATED ORAL at 16:12

## 2020-01-29 RX ADMIN — PANTOPRAZOLE SODIUM 40 MG: 40 TABLET, DELAYED RELEASE ORAL at 06:14

## 2020-01-29 RX ADMIN — ACETAMINOPHEN 650 MG: 325 TABLET, FILM COATED ORAL at 06:14

## 2020-01-29 ASSESSMENT — MIFFLIN-ST. JEOR: SCORE: 1022.78

## 2020-01-29 NOTE — PHARMACY-MEDICATION REGIMEN REVIEW
Pharmacy Medication Regimen Review  Sheila Barraza is a 69 year old female who is currently in the Acute Rehab Unit.    Assessment: All medications have an appropriate indications, durations and no unnecessary use was found.    Plan:   Continue current treatment.  Please add BP holding parameter to lisinopril order.   Attending provider will be sent this note for review.  If there are any emergent issues noted above, pharmacist will contact provider directly by phone.      Pharmacy will periodically review the resident's medication regimen for any PRN medications not administered in > 72 hours and discontinue them. The pharmacist will discuss gradual dose reductions of psychopharmacologic medications with interdisciplinary team on a regular basis.    Please contact pharmacy if the above does not answer specific medication questions/concerns.    Background:  A pharmacist has reviewed all medications and pertinent medical history today.  Medications were reviewed for appropriate use and any irregularities found are listed with recommendations.      Current Facility-Administered Medications:      acetaminophen (TYLENOL) tablet 325-650 mg, 325-650 mg, Oral or Feeding Tube, Q4H PRN, Gbarbea, Demenia B, CNP, 650 mg at 01/29/20 1152     atorvastatin (LIPITOR) tablet 20 mg, 20 mg, Oral or Feeding Tube, QPM, Gbarbea, Demenia B, CNP, 20 mg at 01/28/20 2204     calcium carbonate (TUMS) chewable tablet 1,000 mg, 1,000 mg, Oral, TID PRN, Gbarbea, Demenia B, CNP, 1,000 mg at 01/28/20 1722     dexamethasone (DECADRON) tablet 1 mg, 1 mg, Oral, Daily, Gbarbea, Demenia B, CNP, 1 mg at 01/29/20 0757     glucose gel 15-30 g, 15-30 g, Oral, Q15 Min PRN **OR** dextrose 50 % injection 25-50 mL, 25-50 mL, Intravenous, Q15 Min PRN **OR** glucagon injection 1 mg, 1 mg, Subcutaneous, Q15 Min PRN, Gbarbea, Demenia B, CNP     insulin aspart (NovoLOG) inj (RAPID ACTING), 1-7 Units, Subcutaneous, TID AC, Gbarbea, Demenia B, CNP     insulin  aspart (NovoLOG) inj (RAPID ACTING), 1-5 Units, Subcutaneous, At Bedtime, Gbarbea, Demenia B, CNP     lisinopril (PRINIVIL/ZESTRIL) tablet 20 mg, 20 mg, Oral, Daily, Gbarbea, Demenia B, CNP     magic mouthwash suspension (diphenhydramine, lidocaine, aluminum-magnesium & simethicone), 10 mL, Swish & Swallow, Q6H PRN, Gbarbea, Demenia B, CNP     meclizine (ANTIVERT) tablet 12.5 mg, 12.5 mg, Oral, TID PRN, Gbarbea, Demenia B, CNP, 12.5 mg at 01/29/20 1013     NONFORMULARY, , Oral, QAM AC, Waleska Reaves MD     pantoprazole (PROTONIX) EC tablet 40 mg, 40 mg, Oral, QAM AC, Gbarbea, Demenia B, CNP, 40 mg at 01/29/20 0614     Vitamin D3 (CHOLECALCIFEROL) 25 mcg (1000 units) tablet 1,000 Units, 1,000 Units, Oral, Daily, Gbarbea, Demenia B, CNP, 1,000 Units at 01/29/20 0757  No current outpatient prescriptions on file.   PMH: brainstem cavernoma s/p hemorrhage, s/p resected via suboccipital craniotomy on 1/22/20, Neurogenic bowel and bladder: started after surgery, HTN, IBS, HLD, trigeminal neuralgia s/p two rhizotomies at [facility] on the left side, inguinal hernia repair, and JIMENEZ.

## 2020-01-29 NOTE — PLAN OF CARE
Pt is alert and oriented x 4, she has ongoing pain on the back of the head. She also has dizziness and had anti vert for that. She needed a minimal assist of one person with a walker for transfer and ambulation to the toilet. Surgical incision to back of the head is clean, dry, and intact, it is open to air. We will continue to monitor pain, surgical incision and assist with activity of daily livings.

## 2020-01-29 NOTE — PROGRESS NOTES
01/28/20 1600   Quick Adds   Type of Visit Initial PT Evaluation      Language English   Living Environment   Lives With spouse   Living Arrangements condominium   Home Accessibility no concerns   Transportation Anticipated family or friend will provide   Living Environment Comment PT: Pt lives in [facility] [place] with  with no stair requirement. Pt describes being the main caregiver for  for 18 years   Self-Care   Usual Activity Tolerance excellent   Current Activity Tolerance fair   Regular Exercise Yes   Activity/Exercise Type walking   Equipment Currently Used at Home none   Activity/Exercise/Self-Care Comment PT: Pt describes walking 2-4 miles a day. in winter uses treadmill at home   Functional Level Prior   Ambulation 0-->independent   Transferring 0-->independent   Toileting 0-->independent   Bathing 0-->independent   Communication 0-->understands/communicates without difficulty   Swallowing 0-->swallows foods/liquids without difficulty   Cognition 0 - no cognition issues reported   Fall history within last six months no   Which of the above functional risks had a recent onset or change? ambulation;transferring;toileting;bathing;dressing;cognition   Prior Functional Level Comment PT: IND with all mobility PTA, no use of AD   General Information   Onset of Illness/Injury or Date of Surgery - Date 01/13/20   Referring Physician Dr. Johnny MD   Patient/Family Goals Statement To go home.   Pertinent History of Current Problem (include personal factors and/or comorbidities that impact the POC) Posterior medullary ICH, s/p suboccipital craniotomy PMH trigeminal neuralgia, HTN, IBS, HLD, JIMENEZ noc on c-pap.  Presented to an outside hospital with concern of stroke-like symptoms in the setting of a known brainstem cavernoma   Precautions/Limitations fall precautions   Heart Disease Risk Factors High blood pressure;Dislipidemia;Family history;Gender;Age   General Observations pt supine in bed,  "pleasant and agreeable to t PT eval   General Info Comments PT: Pt is lying semisupine awake in bed able to communicate verbally with mild dysarthria given weakness around mouth musculature.   Cognitive Status Examination   Orientation orientation to person, place and time   Level of Consciousness alert   Follows Commands and Answers Questions 100% of the time   Personal Safety and Judgment intact   Memory intact   Cognitive Comment pt able to recall recent history, verbalizes safety concerns and need for assist for mobility   Pain Assessment   Patient Currently in Pain No   Integumentary/Edema   Integumentary/Edema no deficits were identifed   Posture    Posture Comments mild head tilt to R, otherwise no deficits   Range of Motion (ROM)   ROM Comment PT: All B LE WFL   Strength   Strength Comments PT: All LE joints 5/5.    Bed Mobility   Bed Mobility Comments SBA, light use of rail or armrest of chair next to bed   Transfer Skills   Transfer Comments SBA with vcs for safety   Gait   Gait Comments CGA with FWW   Balance   Balance Comments good dynamic sitting balance, Burroughs 34/56. Posterior lean d/t not WB through forefoot   Sensory Examination   Sensory Perception Comments PT: Intact B LE fine touch although pt describes a feeling of \"tingling\" from B knees to toes   Coordination   Coordination Comments PT: B LE heel/shin test intact   Muscle Tone   Muscle Tone no deficits were identified   Modality Interventions   Planned Modality Interventions TENS;Cryotherapy   Planned Modality Interventions Comments possibly TENS for pain mgmt and/or sensory integration, cold for pain mgmt   General Therapy Interventions   Planned Therapy Interventions ADL retraining;balance training;bed mobility training;gait training;groups;neuromuscular re-education;transfer training;risk factor education;home program guidelines;progressive activity/exercise   Clinical Impression   Criteria for Skilled Therapeutic Intervention yes, treatment " indicated   PT Diagnosis impaired functional mobility   Influenced by the following impairments balance, coordination, somatosensation, vision, activity tolerance   Functional limitations due to impairments transfers, gait, stairs, community and household mobility, ability to care for , drive   Clinical Presentation Evolving/Changing   Clinical Presentation Rationale pt with > 4 personal factors limiting independence w/ functional mobility   Clinical Decision Making (Complexity) High complexity   Therapy Frequency Other (see comments)  (60-75 minutes daily)   Predicted Duration of Therapy Intervention (days/wks) 7-10 days   Anticipated Equipment Needs at Discharge tripod cane;front wheeled walker   Anticipated Discharge Disposition Home with Outpatient Therapy   Risk & Benefits of therapy have been explained Yes   Patient, Family & other staff in agreement with plan of care Yes   Clinical Impression Comments please see care plan note   Total Evaluation Time   Total Evaluation Time (Minutes) 30

## 2020-01-29 NOTE — PROGRESS NOTES
01/29/20 1200   Quick Adds   Type of Visit Initial Occupational Therapy Evaluation   Living Environment   Lives With spouse   Living Arrangements condominium   Home Accessibility no concerns   Transportation Anticipated family or friend will provide   Living Environment Comment OT: Pt lives in [facility] in [place] with  with no stair requirement. Pt describes being the main caregiver for  for 18 years. Bathroom set up: tubshower and walk in shower. Grab bars by the shower and the toilet. Raised toilet. Bedroom: Standard bed, no rails or moveable features.    Self-Care   Usual Activity Tolerance excellent   Current Activity Tolerance fair   Regular Exercise Yes   Activity/Exercise Type walking   Exercise Amount/Frequency daily  (2-4 miles a day)   Equipment Currently Used at Home none   Activity/Exercise/Self-Care Comment OT: Pt enjoys walking 2-4 miles/day. Pt enjoys attending ball games with family.   Functional Level   Ambulation 0-->independent   Transferring 0-->independent   Toileting 0-->independent   Bathing 0-->independent   Dressing 0-->independent   Eating 0-->independent   Communication 0-->understands/communicates without difficulty   Swallowing 0-->swallows foods/liquids without difficulty   Cognition 0 - no cognition issues reported   Fall history within last six months no   Which of the above functional risks had a recent onset or change? ambulation;transferring;toileting;bathing;dressing;cognition;communication/speech;swallowing;eating   Prior Functional Level Comment OT: Pt IND ADLs/IADLs, driving, caregiver for spouse. Pt IND transfers/mobility       Present no   Language English   General Information   Onset of Illness/Injury or Date of Surgery - Date 01/13/20   Referring Physician Waleska Reaves MD   Patient/Family Goals Statement Pt goal to return home as soon as possible   Additional Occupational Profile Info/Pertinent History of Current Problem Sheila  Christi is a 69 year old right hand dominant female with brainstem cavernoma status post hemorrhage now resected via suboccipital craniotomy on 1/22/20. PMHx HTN, IBS, HLD, trigeminal neuralgia s/p two rhizotomies at [facility] on the left side, inguinal hernia repair, and JIMENEZ.  Hospital course complicated by  a fall.  Admitted to acute inpatient rehab 01/28/20   Precautions/Limitations fall precautions   Weight-Bearing Status - LUE other (see comments)   Weight-Bearing Status - RUE other (see comments)   Weight-Bearing Status - LLE full weight-bearing   Weight-Bearing Status - RLE full weight-bearing   Heart Disease Risk Factors Medical history   General Info Comments Lower BP, dizziness, blurry vision   Cognitive Status Examination   Orientation orientation to person, place and time   Level of Consciousness alert   Follows Commands (Cognition) follows three step commands   Memory impaired   Attention Alternating attention impaired, difficulty shifting between tasks;Sustained attention impaired   Executive Function Cognitive flexibility impaired;Working memory impaired, decreased storage of information for performing tasks   Cognitive Comment Per hospital charting: She scored 9/22 on MoCA blind (this assessment was completed d/t c/o bilateral and unilateral blurry vision), and scored 7/15 on Memory index score. She had difficulty w/ complex money tasks and demos impaired problem solving during path finding activities.    Visual Perception   Visual Perception Wears glasses   Visual Acuity Blurry vision bilaterally. Unable to read communication board across the room. Able to read phone with one eye closed.    Visual Field No visual field deficit   Visual Attention Able to sustain eye contact during conversation   Occulomotor Able to track to all fields, difficulties with convergence.    Sensation Light Touch, Rehab Eval   LUE mild impairment   RUE moderate impairment   Sensation Sharp Dull Discrimination, Rehab Eval    LUE mild impairment   RUE mild impairment   Stereognosis, OT Eval   LUE within normal limits   RUE within normal limits   Proprioception, OT Eval   LUE mild impairment   RUE mild impairment   Pain Assessment   Patient Currently in Pain Yes, see Vital Sign flowsheet   Integumentary/Edema   Integumentary/Edema no deficits were identifed   Posture   Posture not impaired   Range of Motion (ROM)   ROM Comment BUE AROM WFL/WNL   Hand Strength   Left hand  (pounds) 48 pounds   Right hand  (pounds) 50 pounds   Muscle Tone Assessment   Muscle Tone Quick Adds No deficits were identified   Coordination   Left hand, nine hole peg test (seconds) 50   Right hand, nine hole peg test (seconds) 45   ARC Assessment Only   Acute Rehab Functional Assessment See IP Rehab Daily Documentation Flowsheet for Functional Mobility/ADL Assessment   Activities of Daily Living Analysis   Impairments Contributing to Impaired Activities of Daily Living balance impaired;cognition impaired;coordination impaired;post surgical precautions;strength decreased;sensation decreased;sensory feedback impaired;postural control impaired;motor control impaired   General Therapy Interventions   Planned Therapy Interventions ADL retraining;IADL retraining;balance training;cognition;fine motor coordination training;groups;motor coordination training;neuromuscular re-education;strengthening;transfer training;visual perception;home program guidelines;progressive activity/exercise   Clinical Impression   Criteria for Skilled Therapeutic Interventions Met yes, treatment indicated   OT Diagnosis Decreased IND ADLs/IADLs.   Influenced by the following impairments Impairements include dizziness, visual impairments, sensory impairment, impaired sensory feedback, impaired balance, impaired cognition   Assessment of Occupational Performance 5 or more Performance Deficits   Identified Performance Deficits Performance deficits include mobility, transfers, dressing,  grooming, toileting, bathing, meal prep, household management, community transportation, med admin, finances, caregiving.   Clinical Decision Making (Complexity) Moderate complexity   Therapy Frequency Daily   Predicted Duration of Therapy Intervention (days/wks) 10 days   Anticipated Equipment Needs at Discharge reacher;other (see comments)  (fww, shower chair)   Anticipated Discharge Disposition Home with Outpatient Therapy;Home with Home Therapy   Risks and Benefits of Treatment have been explained. Yes   Patient, Family & other staff in agreement with plan of care Yes   Clinical Impression Comments The pt will benefit from skilled OT intervention to address goals in POC and maximize functional IND and safety in d/c environment.   Total Evaluation Time   Total Evaluation Time (Minutes) 30

## 2020-01-29 NOTE — H&P
Post Admission Physician Evaluation:    I have compared Izabel Barraza's condition on admission to acute rehabilitation to that outlined in the preadmission screen. History and physical exam performed by me. No significant differences are identified and the patient remains appropriate for an inpatient rehabilitation facility level of care to manage medical issues and address functional impairments due to (rehabilitation diagnosis) pontomedullary ICH + suboccipital craniotomy for resection of brainstem cavernous malformation.    Comorbid medical conditions being managed: wound care, pain, HTN, HLD, JIMENEZ, IBS, and h/o trigeminal neuralgia.     Prior functional level: I with all aspects of her life    Present function: functional deficits include dysphagia, dysarthria, paresthesia in hands R>L and bilateral lower extremities (from knee down), imbalance, dizziness, neurogenic bladder and bowel. Currently requires CGA to min A for transfers, mobility and ADLs. Needs full evaluation of cognitive deficits.       Anticipated rehabilitation course: improvement to mod I level with mobility and ADLs. Might need supervision with iADLs pending her cognitive deficits.     Will benefit from intensive rehabilitation includin minutes each of PT, OT and SLP  Rehabilitation nursing  Close management by physiatry    Prognosis: good medical and rehab prognosis.     Estimated length of stay: 10 days    Waleska Reaves MD  Physical Medicine & Rehabilitation

## 2020-01-29 NOTE — PLAN OF CARE
FOCUS/GOAL  Bowel management, Bladder management, Nutrition/Feeding/Swallowing precautions, Pain management and Wound care management    ASSESSMENT, INTERVENTIONS AND CONTINUING PLAN FOR GOAL:     Pt A&Ox4. VSS. Denied sob, denied difficulty breathing, denied chest pain.  C/o discomfort to posterior head surgical site. Effective pain management with PRN tylenol and cold pack. DD1/thin. Good appetite and oral hydration. Continent of BB. BMx2 and urinating with out difficulties. Surgical incision to posterior head CDI. staples intact. Some ecchymosis noted around the incision. Ax1 with gait belt and walker. Takes pills whole. Pt uses [facility] orange for IBS. Dr. Galeano contacted pharmacy and pharmacy is looking If able to provide. Pt takes it every day before breakfast.  Using call light appropriately to make needs known. aalrm on. Call light with in reach.

## 2020-01-29 NOTE — PROGRESS NOTES
Discharge Planner OT   Patient plan for discharge: Home with OP therapies  Current status: Close SBA/CGA mobility and transfers using fww. Pt requiring CGA toilet transfer/hygiene, CGA dressing, and SBA standing g/h. Performance impacted by dizziness, visual impairments, impaired balance, and cognitive impairments.   Barriers to return to prior living situation: Current level of assistance, cognition.   Recommendations for discharge: Goals to progress to Mod IND ADLs and simple IADLs. Anticipate pt will require assist for heavier/more complex IADLs. 10 days to address goals in POC.       Entered by: Nu Musa 01/29/2020 1:25 PM

## 2020-01-29 NOTE — PROGRESS NOTES
01/29/20 0939   Signing Clinician's Name / Credentials   Signing clinician's name / credentials Claudia Santana DPT   Burroughs Balance Scale (MATILDE BISHOP, CRISTI S, PENG ARNOLD, PARESH CAMPUZANO: MEASURING BALANCE IN THE ELDERLY: VALIDATION OF AN INSTRUMENT. CAN. J. PUB. HEALTH, JULY/AUGUST SUPPLEMENT 2:S7-11, 1992.)   Sit To Stand 3   Standing Unsupported 3   Sitting Unsupported 4   Stand to Sit 4   Transfers 3   Standing with Eyes Closed 2  (posterior LOB at 10 seconds)   Standing Unsupported, Feet Together 2   Reach Forward With Outstretched Arm 4   Retrieve Object From Floor 3   Turning to Look Behind 3  (able but needs close SBA)   Turn 360 Degrees 1   Placing Alternate Foot on Stool (4-6 inches) 1  (CGA to liht MIN A d/t post. lean)   Unsupported Tandem Stand (Demonstrate to Subject) 0   One Leg Stand 1   Total Score (A score of 45 or less has been correlated with an increased risk of falls)   Total Score (out of 56) 34     Burroughs Balance Scale (BBS) Cutoff Scores for CVA Population:  Patient Score: 34/56 indicated moderate increased risk for falls, need for assistive device    The BBS is a measure of static and dynamic standing balance that has been validated in community dwelling elderly individuals and individuals who have Parkinson's Disease, MS, and those who are s/p CVA and TBI. The test is administered without an assistive device. Scores from the Burroughs are used to determine the probability of falling based on the patient's previous history of falls and their test performance.     0-20 High risk for falling- Corresponded with w/c bound status  21-40 Medium risk for falling- Able to walk with assistance  41-56 Low risk for falling- Able to walk independently  According to [facility].  Available at http://www.strokecenter.org/.  Accessibility verified April 10, 2013.  Minimal Detectable Change = 6.5 according to Stephen & Seney 2008    Assessment (rationale for performing, application to patient s  function & care plan): pt s/p ICH and craniotomy with demonstrated impairments in balance, performed as objective outcome measure to assess risk for falls.      Minutes billed as physical performance test: 20 including pt education

## 2020-01-29 NOTE — PLAN OF CARE
FOCUS/GOAL  Bowel management, Bladder management, Medication management, Pain management, Mobility, Skin integrity, Cognition/Memory/Judgment/Problem solving, and Safety management    ASSESSMENT, INTERVENTIONS AND CONTINUING PLAN FOR GOAL:  Pt is alert and oriented, with slurred speech. Continent of bladder, does wear a pad for occasional dribbling. Continent of small BM this shift. Pt reports not being aware of when she defecates, but has no issues with incontinence. Medications taken whole with water. Reported head pain x 2, requested and received Tylenol. Pt was sleeping upon pain reassessment. Pt up CGA with gait belt and walker to the toilet. Skin intact with incision to posterior neck/head, closed with sutures. Pt slept well wearing nasal CPAP throughout the night. Bed alarm on for safety. Pt uses call light appropriately, able to make needs known. Will continue with POC.

## 2020-01-29 NOTE — PROGRESS NOTES
"  [facility]   Acute Rehabilitation Unit  Daily progress note    INTERVAL HISTORY  No acute events overnight.  Today Sheila has no concerns or complaints at this time.  Denies chest pain, shortness of breath, fevers, or chills.  Lisinopril was held this morning due to soft BP, will continue to monitor.  Therapy evaluations underway today.      MEDICATIONS  Scheduled:    atorvastatin  20 mg Oral or Feeding Tube QPM     dexamethasone  1 mg Oral Daily     insulin aspart  1-7 Units Subcutaneous TID AC     insulin aspart  1-5 Units Subcutaneous At Bedtime     lisinopril  20 mg Oral Daily     [START ON 1/30/2020] methylcellulose  500 mg Oral Daily     pantoprazole  40 mg Oral QAM AC     Vitamin D3  1,000 Units Oral Daily        PRN:  acetaminophen, calcium carbonate, glucose **OR** dextrose **OR** glucagon, magic mouthwash suspension (diphenhydrAMINE, lidocaine, aluminum-magnesium & simethicone), meclizine       PHYSICAL EXAM  Patient Vitals for the past 24 hrs:   BP Temp Temp src Pulse Resp SpO2 Height Weight   01/29/20 1604 126/66 96.8  F (36  C) Oral 70 17 99 % -- --   01/29/20 0919 -- 97.2  F (36.2  C) Oral -- -- 92 % -- --   01/29/20 0758 98/55 -- -- 75 16 -- -- --   01/29/20 0700 -- -- -- -- -- -- -- 57.8 kg (127 lb 8 oz)   01/29/20 0105 117/60 95  F (35  C) Oral 88 18 98 % -- --   01/28/20 1933 -- -- -- -- -- 98 % -- --   01/28/20 1931 -- -- -- -- -- 98 % -- --   01/28/20 1929 137/69 96.2  F (35.7  C) Oral 91 18 97 % -- --   01/28/20 1918 -- -- -- -- -- -- 1.537 m (5' 0.5\") 58.1 kg (128 lb)   01/28/20 1645 111/60 95.7  F (35.4  C) Oral 70 18 96 % -- --       GEN: NAD, pleasant and cooperative  HEENT: Posterior craniotomy incision healing well, closed with sutures.  There is a small fluid accumulation at the proximal end.  No erythema, drainage, warmth, or tenderness to palpation.   CVS: RRR, S1+S2, no m/r/g  PULM: CTA b/l, no w/r/r  ABD: Soft, NT, ND, bowel sounds " present  EXT: No LE edema or calf tenderness b/l  Neuro: Answers appropriately, follows commands    LABS  CBC RESULTS:   Recent Labs   Lab Test 01/28/20  0602   WBC 16.9*   RBC 3.83   HGB 12.9   HCT 36.1   MCV 94   MCH 33.7*   MCHC 35.7   RDW 12.1          Last Comprehensive Metabolic Panel:  Sodium   Date Value Ref Range Status   01/28/2020 135 133 - 144 mmol/L Final     Potassium   Date Value Ref Range Status   01/28/2020 3.6 3.4 - 5.3 mmol/L Final     Chloride   Date Value Ref Range Status   01/28/2020 102 94 - 109 mmol/L Final     Carbon Dioxide   Date Value Ref Range Status   01/28/2020 25 20 - 32 mmol/L Final     Anion Gap   Date Value Ref Range Status   01/28/2020 8 3 - 14 mmol/L Final     Glucose   Date Value Ref Range Status   01/28/2020 131 (H) 70 - 99 mg/dL Final     Urea Nitrogen   Date Value Ref Range Status   01/28/2020 20 7 - 30 mg/dL Final     Creatinine   Date Value Ref Range Status   01/28/2020 0.77 0.52 - 1.04 mg/dL Final     GFR Estimate   Date Value Ref Range Status   01/28/2020 77 >60 mL/min/[1.73_m2] Final     Comment:     Non  GFR Calc  Starting 12/18/2018, serum creatinine based estimated GFR (eGFR) will be   calculated using the Chronic Kidney Disease Epidemiology Collaboration   (CKD-EPI) equation.       Calcium   Date Value Ref Range Status   01/28/2020 9.2 8.5 - 10.1 mg/dL Final       Recent Labs   Lab 01/29/20  0751 01/28/20  2142 01/28/20  1715 01/28/20  1228 01/28/20  0742 01/28/20  0602 01/28/20  0338  01/27/20  0724  01/25/20  0717  01/24/20  0416  01/23/20  0412   GLC  --   --   --   --   --  131*  --   --  142*  --  158*  --  172*  --  176*   * 133* 88 185* 110*  --  145*   < >  --    < >  --    < >  --    < >  --     < > = values in this interval not displayed.       ASSESSMENT/PLAN:  Sheila Barraza is a 69 year old right hand dominant female with brainstem cavernoma status post hemorrhage now resected via suboccipital craniotomy on 1/22/20. PMHx  HTN, IBS, HLD, trigeminal neuralgia s/p two rhizotomies at [facility] on the left side, inguinal hernia repair, and JIMENEZ.  Hospital course complicated by  a fall.  Admitted to acute inpatient rehab 01/28/20      Impairment group code: 01.4 No Paresis Stroke: pontomedullary ICH + suboccipital craniotomy for resection of brainstem cavernous malformation        1. PT, OT and SLP 60 minutes of each on a daily basis, in addition to rehab nursing and close management of physiatrist.       2. Impairment of ADL's:  OT for 60 min daily to work on upper and lower body self care, dressing, toileting, bathing, energy conservation techniques with use of ADs as needed.      3. Impairment of mobility:   PT for 60 min daily to work on gait exercises, strengthening, endurance buildup, transfers with use of walker as needed.      4. Impairment of cognition/language/swallow:   SLP for 60 min daily for cognitive evaluation and treatment strategies for higher level cognitive deficits and memory impairment.      5. Rehab RN to administer medication, patient education on medication taking, VS monitoring, bowel regimen, glucose monitoring and wound care/surgical wound dressing changes and monitoring. .     1. Medical Conditions  #Pontomeduallry ICH, ICH Score = 1, NIHSS = 0  #Brainstem cavernoma s/p resection vis suboccipital craniotomy 1/22/20  - Decadron, taper over one week, sliding scale insulin while on decadron  - SBP Goal < 140    - wound care; ok to remove sutures in 2 weeks  - leukocytosis most likely due to steroids and demargination; will monitor clinically  PT/OT/Speech      #Trigeminal Neuralgia  Prior balloon rhizotomies x2 at the [facility], on the left side. Left sensory deficit      #HLD  -Atorvastatin 20 mg daily     # Hypertension  - SBP goals of < 140   - lisinopril 20 mg daily     #Wound Care: posterior head, S/P craniotomy,  Sutures are not absorbable and need to be removed in 2 weeks. If patient still at rehab by  this time, the sutures may be removed by the rehab physician,if considers that the wound has healed completely. Sutures should be removed on post-operative day #14 (2/5/2020).  -monitor for infection      #JIMENEZ  Pt has been using CPAP for ~7 years   - Continue home home CPAP     # IBS   - PRN bowel regimen and scheduled Citrucel      # Neurogenic bowel and bladder: started after surgery. no sensation when defecating or urinating. She has impaired sensation at genital area and buttocks.   - UA on 1/26 due to urgency with result neg      # H/o Fall on POD #1 the patient fell and hit her head while attempted to clean herself on the toilet. A Head CT and C-Spine CT were obtained and were negative for acute pathology        2. Adjustment to disability:  Clinical psychology to eval and treat  3. FEN: DD1 diet w/ thin liquids  4. Bowel: incontinence. No sensation  5. Bladder: PVRs x3 WNL, may check PRN only   6. DVT Prophylaxis: mechanical, declined heparin, wants to walk.  7. GI Prophylaxis: protonix  8. Code: Full  9. Disposition: home   10. ELOS:  10 days.  11. Rehab prognosis:  anticipate progression  12. Follow up Appointments on Discharge:               -Neurosurgery Clinic in 10-14 days for wound evaluation.              - Dr. Prabhakar in Neurosurgery Clinic in ~3 months.prior Once discharged from rehab but prior to returning to [place].  Please page Anna Holloway CNP @ [phone] or [phone] once discharge is anticipated for coordination of appointment scheduling              - Primary Care Provider in 7 days      Braulio Potter MD  Department of Rehabilitation Medicine  Pager: [phone]    Time Spent on this Encounter   I, Braulio Potter, spent a total of 25 minutes face-to-face or managing the care of Sheila Barraza. Over 50% of my time on the unit was spent counseling the patient and coordinating care. See note for details.

## 2020-01-29 NOTE — PLAN OF CARE
SLP: pt currently on DD1 with nectar- screened swallow - remains appropriate for this diet-- recommending no straws still, alternate solids and liquids - small bites/sips. Will plan to complete full swallow eval at meal tomorrow and to trial DD2 textures. There were no s/s of aspiration with DD1 or with thin liquids but pt's mouth remains numb.    Completed formal speech and language eval and informal cognitive assessment per MD orders- pt presents with mild dysarthria states that her mouth and tongue still feel numb- pt is mostly 100% intelligible but imprecision in speech is noted. Word retrieval/verbal expression, reading comprehension for complex info are WFL. Pt does have some minimal impairments in complex auditory comprehension but these seem more related to deficits in ST recall. Written expression is WFL. Pt demonstrates mild- moderate impairments in cognition characterized by moderate deficits in ST memory, moderate impairments in flexible attention and mild impairments in numerical reasoning/ processing time. Pt's current level of cogntive and speech functioning are below baseline and pt will benefit from skilled SLP intervention for improved safety and independence in IADL's

## 2020-01-29 NOTE — PLAN OF CARE
Speech Language Therapy Discharge Summary    Reason for therapy discharge:    Discharged to acute rehabilitation facility.    Progress towards therapy goal(s). See goals on Care Plan in [facility] electronic health record for goal details.  Goals not met.  Barriers to achieving goals:   discharge from facility.    Therapy recommendation(s):    Continued therapy is recommended.  Rationale/Recommendations:  Continued ST for dysphagia.    Per the last ST note: Recommend change diet textures to puree (dysphagia diet level 1) and liquids to thin consistency given 1:1 supervision and consistent use of swallow strategies (fully upright position, no straws, small single sips/bites, alternate food/liquid every bite, rest breaks as needed). Monitor closely for increased/persistent signs/symptoms of aspiration and hold PO if occur. May consider video swallow study to further assess oropharyngeal swallow function given inconsistent signs of aspiration during sessions

## 2020-01-30 ENCOUNTER — APPOINTMENT (OUTPATIENT)
Dept: OCCUPATIONAL THERAPY | Facility: CLINIC | Age: 70
End: 2020-01-30
Payer: MEDICARE

## 2020-01-30 ENCOUNTER — APPOINTMENT (OUTPATIENT)
Dept: SPEECH THERAPY | Facility: CLINIC | Age: 70
End: 2020-01-30
Payer: MEDICARE

## 2020-01-30 ENCOUNTER — APPOINTMENT (OUTPATIENT)
Dept: PHYSICAL THERAPY | Facility: CLINIC | Age: 70
End: 2020-01-30
Payer: MEDICARE

## 2020-01-30 LAB
ANION GAP SERPL CALCULATED.3IONS-SCNC: 6 MMOL/L (ref 3–14)
BASOPHILS # BLD AUTO: 0 10E9/L (ref 0–0.2)
BASOPHILS NFR BLD AUTO: 0 %
BUN SERPL-MCNC: 16 MG/DL (ref 7–30)
CALCIUM SERPL-MCNC: 8.4 MG/DL (ref 8.5–10.1)
CHLORIDE SERPL-SCNC: 102 MMOL/L (ref 94–109)
CO2 SERPL-SCNC: 27 MMOL/L (ref 20–32)
CREAT SERPL-MCNC: 0.76 MG/DL (ref 0.52–1.04)
DIFFERENTIAL METHOD BLD: ABNORMAL
EOSINOPHIL # BLD AUTO: 0.1 10E9/L (ref 0–0.7)
EOSINOPHIL NFR BLD AUTO: 0.9 %
ERYTHROCYTE [DISTWIDTH] IN BLOOD BY AUTOMATED COUNT: 12.5 % (ref 10–15)
GFR SERPL CREATININE-BSD FRML MDRD: 79 ML/MIN/{1.73_M2}
GLUCOSE BLDC GLUCOMTR-MCNC: 115 MG/DL (ref 70–99)
GLUCOSE BLDC GLUCOMTR-MCNC: 123 MG/DL (ref 70–99)
GLUCOSE BLDC GLUCOMTR-MCNC: 97 MG/DL (ref 70–99)
GLUCOSE BLDC GLUCOMTR-MCNC: 98 MG/DL (ref 70–99)
GLUCOSE SERPL-MCNC: 112 MG/DL (ref 70–99)
HCT VFR BLD AUTO: 33.7 % (ref 35–47)
HGB BLD-MCNC: 11.8 G/DL (ref 11.7–15.7)
IMM GRANULOCYTES # BLD: 0.2 10E9/L (ref 0–0.4)
IMM GRANULOCYTES NFR BLD: 1.5 %
LYMPHOCYTES # BLD AUTO: 1.6 10E9/L (ref 0.8–5.3)
LYMPHOCYTES NFR BLD AUTO: 15.8 %
MCH RBC QN AUTO: 33.4 PG (ref 26.5–33)
MCHC RBC AUTO-ENTMCNC: 35 G/DL (ref 31.5–36.5)
MCV RBC AUTO: 96 FL (ref 78–100)
MONOCYTES # BLD AUTO: 0.6 10E9/L (ref 0–1.3)
MONOCYTES NFR BLD AUTO: 5.8 %
NEUTROPHILS # BLD AUTO: 7.6 10E9/L (ref 1.6–8.3)
NEUTROPHILS NFR BLD AUTO: 76 %
NRBC # BLD AUTO: 0 10*3/UL
NRBC BLD AUTO-RTO: 0 /100
PLATELET # BLD AUTO: 201 10E9/L (ref 150–450)
POTASSIUM SERPL-SCNC: 3.9 MMOL/L (ref 3.4–5.3)
RBC # BLD AUTO: 3.53 10E12/L (ref 3.8–5.2)
SODIUM SERPL-SCNC: 135 MMOL/L (ref 133–144)
WBC # BLD AUTO: 10 10E9/L (ref 4–11)

## 2020-01-30 PROCEDURE — 25000131 ZZH RX MED GY IP 250 OP 636 PS 637: Mod: GY | Performed by: NURSE PRACTITIONER

## 2020-01-30 PROCEDURE — 97110 THERAPEUTIC EXERCISES: CPT | Mod: GP | Performed by: STUDENT IN AN ORGANIZED HEALTH CARE EDUCATION/TRAINING PROGRAM

## 2020-01-30 PROCEDURE — 25000132 ZZH RX MED GY IP 250 OP 250 PS 637: Mod: GY | Performed by: PHYSICAL MEDICINE & REHABILITATION

## 2020-01-30 PROCEDURE — 97112 NEUROMUSCULAR REEDUCATION: CPT | Mod: GP | Performed by: STUDENT IN AN ORGANIZED HEALTH CARE EDUCATION/TRAINING PROGRAM

## 2020-01-30 PROCEDURE — 36415 COLL VENOUS BLD VENIPUNCTURE: CPT | Performed by: PHYSICAL MEDICINE & REHABILITATION

## 2020-01-30 PROCEDURE — 97116 GAIT TRAINING THERAPY: CPT | Mod: GP | Performed by: STUDENT IN AN ORGANIZED HEALTH CARE EDUCATION/TRAINING PROGRAM

## 2020-01-30 PROCEDURE — 97130 THER IVNTJ EA ADDL 15 MIN: CPT | Mod: GN | Performed by: SPEECH-LANGUAGE PATHOLOGIST

## 2020-01-30 PROCEDURE — 92610 EVALUATE SWALLOWING FUNCTION: CPT | Mod: GN | Performed by: SPEECH-LANGUAGE PATHOLOGIST

## 2020-01-30 PROCEDURE — 97535 SELF CARE MNGMENT TRAINING: CPT | Mod: GO | Performed by: STUDENT IN AN ORGANIZED HEALTH CARE EDUCATION/TRAINING PROGRAM

## 2020-01-30 PROCEDURE — 80048 BASIC METABOLIC PNL TOTAL CA: CPT | Performed by: PHYSICAL MEDICINE & REHABILITATION

## 2020-01-30 PROCEDURE — 97129 THER IVNTJ 1ST 15 MIN: CPT | Mod: GN | Performed by: SPEECH-LANGUAGE PATHOLOGIST

## 2020-01-30 PROCEDURE — 25000132 ZZH RX MED GY IP 250 OP 250 PS 637: Mod: GY | Performed by: NURSE PRACTITIONER

## 2020-01-30 PROCEDURE — 12800006 ZZH R&B REHAB

## 2020-01-30 PROCEDURE — 85025 COMPLETE CBC W/AUTO DIFF WBC: CPT | Performed by: PHYSICAL MEDICINE & REHABILITATION

## 2020-01-30 PROCEDURE — 97530 THERAPEUTIC ACTIVITIES: CPT | Mod: GP | Performed by: STUDENT IN AN ORGANIZED HEALTH CARE EDUCATION/TRAINING PROGRAM

## 2020-01-30 PROCEDURE — 00000146 ZZHCL STATISTIC GLUCOSE BY METER IP

## 2020-01-30 RX ADMIN — ACETAMINOPHEN 650 MG: 325 TABLET, FILM COATED ORAL at 21:50

## 2020-01-30 RX ADMIN — ACETAMINOPHEN 650 MG: 325 TABLET, FILM COATED ORAL at 08:02

## 2020-01-30 RX ADMIN — ACETAMINOPHEN 650 MG: 325 TABLET, FILM COATED ORAL at 00:17

## 2020-01-30 RX ADMIN — MELATONIN 1000 UNITS: at 07:54

## 2020-01-30 RX ADMIN — ACETAMINOPHEN 650 MG: 325 TABLET, FILM COATED ORAL at 13:03

## 2020-01-30 RX ADMIN — DEXAMETHASONE 1 MG: 1 TABLET ORAL at 07:53

## 2020-01-30 RX ADMIN — LISINOPRIL 20 MG: 20 TABLET ORAL at 07:54

## 2020-01-30 RX ADMIN — MECLIZINE HCL 12.5 MG 12.5 MG: 12.5 TABLET ORAL at 08:51

## 2020-01-30 RX ADMIN — PANTOPRAZOLE SODIUM 40 MG: 40 TABLET, DELAYED RELEASE ORAL at 08:00

## 2020-01-30 RX ADMIN — ACETAMINOPHEN 650 MG: 325 TABLET, FILM COATED ORAL at 04:20

## 2020-01-30 RX ADMIN — ATORVASTATIN CALCIUM 20 MG: 20 TABLET, FILM COATED ORAL at 21:50

## 2020-01-30 RX ADMIN — ACETAMINOPHEN 650 MG: 325 TABLET, FILM COATED ORAL at 17:09

## 2020-01-30 NOTE — PLAN OF CARE
Discharge Planner PT   Patient plan for discharge: in-pt PT to focus on safety with transfers, household ambulation with FWW, possibly cane; gait with head turns/environ scanning; dynamic balance  Current status: SBA-CGA for transfers, gait, stairs w/ B rails (no SERENA or within home). Good cognition - does not need alarms  Barriers to return to prior living situation: none foreseen. Pt motivated, has good support  Recommendations for discharge: anticipate ~7 days to meet POC goals for mod I household mobility, SBA in community. Follow up with OP PT  Rationale for recommendations: mild balance deficits, impaired vision, decreased activity tolerance       Entered by: Claudia Santana 01/30/2020 7:50 AM

## 2020-01-30 NOTE — PLAN OF CARE
FOCUS/GOAL  Bowel management, Bladder management, Pain management, Mobility, Cognition/Memory/Judgment/Problem solving, and Safety management    ASSESSMENT, INTERVENTIONS AND CONTINUING PLAN FOR GOAL:  Pt is alert and oriented. Continent of bladder, voiding spontaneously using toilet. Continent of bowel x 2 this shift. Pt up CGA with gait belt and walker to the bathroom. Head pain managed with Tylenol q 4 hours. Pt reports not sleeping well overnight. CPAP worn throughout the night. Call light within reach, able to make needs known. Will continue with POC.

## 2020-01-30 NOTE — CONSULTS
"   20 0800   Living Arrangements   Lives With spouse   Living Arrangements condominium   Able to Return to Prior Arrangements yes   Living Arrangement Comments Will D/C to dtr's home initially before returning home    Home Safety   Patient Feels Safe Living in Home? yes   Discharge Planning   Expected Discharge Date 20   Patient/Family Anticipates Transition to family members' home   Disposition Comments Pt stated that she does not have a lot of options for outpt close to her home and would like to discuss further with therapy    Concerns to be Addressed discharge planning   Plan   Plan Home to dtr home with outpt therapy?    Patient/Family in Agreement with Plan yes   Plan Comments See above    Discharge Needs Assessment   Transportation Anticipated family or friend will provide  (Family picking up from [place] and will need to plan ahead )       Social Work: Initial Assessment with Discharge Plan    Patient Name: Sheila Barraza  : 1950  Age: 69 year old  MRN: [id number]  Completed assessment with: Pt at bedside   Admitted to ARU: 2020    Presenting Information   Date of SW assessment: 2020  Health Care Directive: Provided education, Declined completing and Health Care Directive Agent (if patient not able to make decisions)  Primary Health Care Agent: Self   Secondary Health Care Agent: Pt  NOK, pt stated that she prefer that her dtr and son-in-law (who is neurological MD of some kind) to make decisions.   Living Situation: Pt and  live in a town house with 0 SERENA and one-level home. No STI. Pt plans to discharge to her dtr's home ([place], [place]) with 1 SERENA and pt will stay on the main level where there is a bedroom/bathroom located.   Previous Functional Status: Independent with all ADLs and IADLs PTA. \"I've never been sick\". Primary caregiver for  who is handicap and needs assistance.   DME available: walker, cane, no w/c, shower bench and grab bars in bathroom. " House is handicap accessible for pt's .   Patient and family understanding of hospitalization: Appropriate. Pt appeared A&x4 and pleasant. 15/15 on BIMs.   Cultural/Language/Spiritual Considerations: Buddhism.       Physical Health  Reason for admission: Status post craniotomy    Provider Information   Primary Care Physician:Fior Jensen   : None reported     Mental Health/Chemical Dependency:   Diagnosis: Denied   Alcohol/Tobacco/Narcotis: Denied   Support/Services in Place: None reported  Services Needed/Recommended: None indicated at this time  Sexuality/Intimacy: Not discussed    Support System  Marital Status:  for 54 years. Met in college. Pt  needs physical assistance but 'his mind is sharp' and he still works from home as an  and does taxes.  got an autoimmune disease 18 years ago.   Other support available: 1 dtr and 2 sons, all live local. Sister-in-law (Hina) who lives 7 miles away and 'will do anything'. Other siblings in [place]. Pt reported good neighbors and friends. 7 grandchildren.     Community Resources  Current in home services: None   Previous services: No hx of HHC, TCU or ARU in the past.     Financial/Employment/Education  Employment Status: Retired. Works as an .   Income Source: Social Security Income   Education: College   Financial Concerns:  Denied   Insurance: Medicare and BCBS       Discharge Plan   Patient and family discharge goal: Discharge to dtr's home, family assistance and outpt (?)   Provided Education on discharge plan: YES  Patient agreeable to discharge plan:  YES--Although, pt has worry that there are not a lot of resources for outpt therapy in her community. Would like to talk to therapy more.   Provided education and attained signature for Medicare IM and IRF Patient Rights and Privacy Information provided to patient : YES  Provided patient with [place] [facility] [place] Resources: No  Barriers to  discharge: None indicated at this time.     Discharge Recommendations   Disposition: See above   Transportation Needs: Pt family.   Name of Transportation Company and Phone: N/A     Additional comments   Pt scored 15/15 on BIMS. See above about pt's concern for discharge and not having outpt locations near her home or in her community. Good support and assistance at discharge. Plan to discharge to her dtr's home initially. Pt family in [place] and will need updates on discharge as they are traveling far to pick pt up and bring home. Pt expressed concerns about her vision. Therapy and MD to address. No further SW needs or concerns. SWer available if needs arise.     Please invite to Care Conference:  Children and        Corrie Jenkins, DUNG, CAD-[place]  [place] [facility]   Phone: [phone]  I   Pager: [phone]

## 2020-01-30 NOTE — PLAN OF CARE
FOCUS/GOAL  Bowel management, Bladder management, Nutrition/Feeding/Swallowing precautions and Pain management    ASSESSMENT, INTERVENTIONS AND CONTINUING PLAN FOR GOAL:   Pt is A&Ox4. VSS. Taking prn tylenol for headache every 4 hrs and using cold therapy. Effective pain management. Denied sob, difficulty breathing, denied chest pian. No neuro changes. Incision to posterior head CDI. DD1/thin diet.  Good appetite and oral hydration. BGS were 103 and 150. Continent of BB. Using toilet in bathroom. BMx1. Urinating without difficulties. Ax1 with walker and gait bed for transfer and mobility. CPAP on. Using call light to make needs known. Alarm on. Call light within reach. Will continue with plan of care and monitoring.

## 2020-01-30 NOTE — PLAN OF CARE
Pt seen for cognitive communication tx- reviewed the memory strategy handout and gave to pt. Completed memory recall task - paragraph level with using the strategy of rehearsal-- pt scored 5/5 with using this strategy.Reviewed the dysarthria strategy handout-- practiced strategies of slow rate, exaggerated articulation and pausing with reading sentences out loud- needing occasional cues to slow rate

## 2020-01-30 NOTE — PROGRESS NOTES
01/30/20 1100   General Information   Onset Date 01/22/20   Start of Care Date 01/30/20   Referring Physician Dr Tariq MD   Patient/Family Goals Statement to return to eating  a regular diet    Swallowing Evaluation Bedside swallow evaluation   Behaviorial Observations WFL (within functional limits)   Mode of current nutrition Oral diet   Type of oral diet Dysphagia diet level 1;Thin liquid   Respiratory Status Room air   Comments Sheila Barraza is a 69 year old right hand dominant female with brainstem cavernoma status post hemorrhage now resected via suboccipital craniotomy on 1/22/20. PMHx HTN, IBS, HLD, trigeminal neuralgia s/p two rhizotomies at [facility] on the left side, inguinal hernia repair, and JIMENEZ.  Hospital course complicated by  a fall.  Admitted to acute inpatient rehab 01/28/20. Pt was seen for swallowing by speech while in hospital; however notes also indicate difficulty with dysarthria   Clinical Swallow Evaluation   Oral Musculature anomalies present  (mild decreased asymmetry; oral motor weakness)   Structural Abnormalities none present   Dentition present and adequate   Mucosal Quality good   Mandibular Strength and Mobility intact   Oral Labial Strength and Mobility impaired coordination   Lingual Strength and Mobility impaired anterior elevation;impaired coordination   Velar Elevation intact   Buccal Strength and Mobility impaired   Laryngeal Function Cough;Throat clear;Swallow;Voicing initiated;Dry swallow palpated   Additional Documentation Yes   Additional evaluation(s) completed today No   Swallow Eval   Feeding Assistance set up only required   Clinical Swallow Eval: Thin Liquid Texture Trial   Mode of Presentation, Thin Liquids spoon;straw;self-fed   Volume of Liquid or Food Presented 4 oz   Oral Phase of Swallow other (see comments)  (mild delayed swallow initiation)   Pharyngeal Phase of Swallow intact   Diagnostic Statement Pt tolerated small single sips of thin water by cup  but when trialed by straw- had coughing on 1 out of 3 trials; swallow initiation is mildly delayed   Clinical Swallow Eval: Puree Solid Texture Trial   Mode of Presentation, Puree spoon;self-fed   Volume of Puree Presented 4 oz   Oral Phase, Puree WFL   Pharyngeal Phase, Puree intact   Successful Strategies Trialed During Procedure, Puree hard swallow;other (see comments)  (alternate solids and liquids)   Diagnostic Statement no s/s of aspiration with pureed solids and no sensation of pharygenal retention; pt does however report numbness with mouth   Clinical Swallow Eval: Semisolid Texture Trial   Mode of Presentation, Semisolid spoon;self-fed   Volume of Semisolid Food Presented small bites of DD2 solids   Oral Phase, Semisolid other (see comments)  (slow discoordinated oral prep)   Oral Residue, Semisolid mid posterior tongue   Pharyngeal Phase, Semisolid repeated swallows;throat clearing   Successful Strategies Trialed During Procedure, Semisolid hard swallow;other (see comments)  (alternate solids and liquids)   Diagnostic Statement slower and discoordinated oral prep; some mild lingual residue following swallow. Pt reporting some snesation of pharygneal retention and also noted a throat clear 1x   Clinical Swallow Eval: Solid Food Texture Trial   Mode of Presentation, Solid self-fed   Volume of Solid Food Presented small bites x2 of jelly sandwich   Oral Phase, Solid Residue in oral cavity;other (see comments)  (slower oral prep)   Oral Residue, Solid mid posterior tongue   Pharyngeal Phase, Solid impaired;feeling of something stuck in throat;repeated swallows   Successful Strategies Trialed During Procedure, Solid hard swallow;other (see comments)  (double swallow ; alternate consistencies)   Diagnostic Statement Pt with even more discoordinate oral prep with DD3 solids- difficulty with complete oral clearance- risidue on tongue and pt reporting sensation of pharyngeal retention    VFSS Evaluation   VFSS  Additional Documentation No   FEES Evaluation   Additional Documentation No   Swallow Compensations   Swallow Compensations Alternate viscosity of consistencies;Effortful swallow;Pacing;Reduce amounts;Multiple swallow   Esophageal Phase of Swallow   Patient reports or presents with symptoms of esophageal dysphagia No   General Therapy Interventions   Planned Therapy Interventions Dysphagia Treatment   Dysphagia treatment Oropharyngeal exercise training;Modified diet education;Instruction of safe swallow strategies   Swallow Eval: Clinical Impressions   Skilled Criteria for Therapy Intervention Skilled criteria met.  Treatment indicated.   Functional Assessment Scale (FAS) 3   Dysphagia Outcome Severity Scale (KALEB) Level 3 - KALEB   Treatment Diagnosis Modderate oral pharyneal dysphagia   Diet texture recommendations Dysphagia diet level 1;Thin liquids   Recommended Feeding/Eating Techniques alternate between small bites and sips of food/liquid;hard swallow w/ each bite or sip;maintain upright posture during/after eating for 30 mins;no straws;small sips/bites   Demonstrates Need for Referral to Another Service occupational therapy;physical therapy   Therapy Frequency Daily   Predicted Duration of Therapy Intervention (days/wks) 7-10 days   Anticipated Discharge Disposition home w/ outpatient services   Risks and Benefits of Treatment have been explained. Yes   Patient, family and/or staff in agreement with Plan of Care Yes   Clinical Impression Comments Compelted clinical bedside swallow eval per MD orders. Pt demonstrates moderate oral pharyngeal dysphagia characterized by the following; sensation of numbness throuhout whole mouth and tongue along with oral motor weakenss which results in slower and discoordinated oral prep with DD2 solids as well as DD3 solids- pt also has some oral residue with both DD2 and DD3 solids - lngusl stasis but does not have this with pureed solids. Further pt did have some throat  clearing with DD2 solids and sensation of pharyngeal retention with DD3 solids-- no sensation of pharygneal retention with pureed solids. With thin liquids- pt does have aspiration s/s with thin liquids by straw( less bolus controla nd also larger bolus size by straw) but with small single sips of thin liquids by cup rim- no overt s/s of aspiration. Pt is also noted to have mild delay in swallow intiation. Recommending that pt remain on current DD1 with thin liquids. Upright for all po, small single sips and bites, NO STRAWS, alternate solids and liquids, double swallow as needed, pace self- eat slowly. Pt's current level of functioning is below baseline and pt will benefit from skilled SLP intervention to improve safety of swallow for pt to tolerate least restrictive diet.    Total Evaluation Time   Total Evaluation Time (Minutes) 45

## 2020-01-30 NOTE — PROGRESS NOTES
"  [facility]   Acute Rehabilitation Unit  Daily progress note    INTERVAL HISTORY  Sheila Barraza was seen and examined lying in bed and stated that the numbness on face has no improvement and impatience with blurry vision not resolving sooner. Discussed and encouragement given on  allowing body to heal with time. Also, Pt reported experiencing dizziness, Meclizine requested and Pt told to let staff when experiencing dizziness or the need for medication.  Functionally, Pt is is CGA with FWW and anticipate progression.      MEDICATIONS  Scheduled meds    atorvastatin  20 mg Oral or Feeding Tube QPM     dexamethasone  1 mg Oral Daily     insulin aspart  1-7 Units Subcutaneous TID AC     insulin aspart  1-5 Units Subcutaneous At Bedtime     lisinopril  20 mg Oral Daily     methylcellulose  500 mg Oral Daily     pantoprazole  40 mg Oral QAM AC     Vitamin D3  1,000 Units Oral Daily       PRN meds:  acetaminophen, calcium carbonate, glucose **OR** dextrose **OR** glucagon, magic mouthwash suspension (diphenhydrAMINE, lidocaine, aluminum-magnesium & simethicone), meclizine      PHYSICAL EXAM  /66   Pulse 68   Temp 97.3  F (36.3  C) (Oral)   Resp 20   Ht 1.537 m (5' 0.5\")   Wt 57.8 kg (127 lb 8 oz)   LMP  (LMP Unknown)   SpO2 97%   BMI 24.49 kg/m    Gen: pleasant,NAD  HEENT: craniotomy incision site C/D/I, erythema at the left sick , at the proximal end noted small inflammation with no tenderness on palpation, no drainage, no redness   Cardio: S1 & S2 present, RRR  Pulm: non-labored and creal sound in all lobes  Abd: soft, BS x4, NT, ND  Ext: on edema B/I, no calf tenderness B/I  Neuro/MSK: Alert and following commands and answering questions appropriately    LABS  Lab Results   Component Value Date    WBC 10.0 01/30/2020     Lab Results   Component Value Date    RBC 3.53 01/30/2020     Lab Results   Component Value Date    HGB 11.8 01/30/2020     Lab Results   Component " Value Date    HCT 33.7 01/30/2020     No components found for: MCT  Lab Results   Component Value Date    MCV 96 01/30/2020     Lab Results   Component Value Date    MCH 33.4 01/30/2020     Lab Results   Component Value Date    MCHC 35.0 01/30/2020     Lab Results   Component Value Date    RDW 12.5 01/30/2020     Lab Results   Component Value Date     01/30/2020     Recent Labs   Lab 01/30/20  0732 01/30/20  0606 01/29/20  2149 01/29/20  1814 01/29/20  1249 01/29/20  0751 01/28/20  2142  01/28/20  0602  01/27/20  0724  01/25/20  0717  01/24/20  0416   GLC  --  112*  --   --   --   --   --   --  131*  --  142*  --  158*  --  172*   *  --  150* 103* 111* 111* 133*   < >  --    < >  --    < >  --    < >  --     < > = values in this interval not displayed.       ASSESSMENT AND PLAN    Sheila Barraza is a 69 year old right hand dominant female with brainstem cavernoma status post hemorrhage now resected via suboccipital craniotomy on 1/22/20. PMHx HTN, IBS, HLD, trigeminal neuralgia s/p two rhizotomies at [facility] on the left side, inguinal hernia repair, and JIMENEZ.  Hospital course complicated by  a fall.  Admitted to acute inpatient rehab 01/28/20     Impairment group code: 01.4 No Paresis Stroke: pontomedullary ICH + suboccipital craniotomy for resection of brainstem cavernous malformation        1. PT, OT and SLP 60 minutes of each on a daily basis, in addition to rehab nursing and close management of physiatrist.       2. Impairment of ADL's:  OT for 60 min daily to work on upper and lower body self care, dressing, toileting, bathing, energy conservation techniques with use of ADs as needed.      3. Impairment of mobility:   PT for 60 min daily to work on gait exercises, strengthening, endurance buildup, transfers with use of walker as needed.      4. Impairment of cognition/language/swallow:   SLP for 60 min daily for cognitive evaluation and treatment strategies for higher level cognitive deficits  and memory impairment.      5. Rehab RN to administer medication, patient education on medication taking, VS monitoring, bowel regimen, glucose monitoring and wound care/surgical wound dressing changes and monitoring. .     1. Medical Conditions  #Pontomeduallry ICH, ICH Score = 1, NIHSS = 0  #Brainstem cavernoma s/p resection vis suboccipital craniotomy 1/22/20  - Decadron, taper over one week, sliding scale insulin while on decadron  - SBP Goal < 140    - wound care; ok to remove sutures in 2 weeks  - leukocytosis most likely due to steroids and demargination; will monitor clinically  PT/OT/Speech      #Trigeminal Neuralgia  Prior balloon rhizotomies x2 at the [facility], on the left side. Left sensory deficit      #HLD  -Atorvastatin 20 mg daily     # Hypertension  - SBP goals of < 140   - lisinopril 20 mg daily     #Wound Care: posterior head, S/P craniotomy,  Sutures are not absorbable and need to be removed in 2 weeks. If patient still at rehab by this time, the sutures may be removed by the rehab physician,if considers that the wound has healed completely. Sutures should be removed on post-operative day #14 (2/5/2020).  -monitor for infection      #JIMENEZ  Pt has been using CPAP for ~7 years   - Continue home home CPAP     # IBS   - PRN bowel regimen and scheduled Citrucel      # Neurogenic bowel and bladder: started after surgery. no sensation when defecating or urinating. She has impaired sensation at genital area and buttocks.   - UA on 1/26 due to urgency with result neg      # H/o Fall on POD #1 the patient fell and hit her head while attempted to clean herself on the toilet. A Head CT and C-Spine CT were obtained and were negative for acute pathology        2. Adjustment to disability:  Clinical psychology to eval and treat  3. FEN: DD1 diet w/ thin liquids  4. Bowel: incontinence. No sensation  5. Bladder: PVRs x3 WNL, may check PRN only   6. DVT Prophylaxis: mechanical, declined heparin, wants to  walk.  7. GI Prophylaxis: protonix  8. Code: Full  9. Disposition: home   10. ELOS:  2/5/20.  11. Rehab prognosis:  anticipate progression  12. Follow up Appointments on Discharge:               -Neurosurgery Clinic in 10-14 days for wound evaluation.              - Dr. Prabhakar in Neurosurgery Clinic in ~3 months.prior Once discharged from rehab but prior to returning to [place].  Please page Anna Holloway CNP @ [phone] or [phone] once discharge is anticipated for coordination of appointment scheduling              - Primary Care Provider in 7 days          Mark Ashby CNP  Physical Medicine & Rehabilitation

## 2020-01-30 NOTE — PLAN OF CARE
Compelted clinical bedside swallow eval per MD orders. Pt demonstrates moderate oral pharyngeal dysphagia characterized by the following; sensation of numbness throuhout whole mouth and tongue along with oral motor weakenss which results in slower and discoordinated oral prep with DD2 solids as well as DD3 solids- pt also has some oral residue with both DD2 and DD3 solids - lngusl stasis but does not have this with pureed solids. Further pt did have some throat clearing with DD2 solids and sensation of pharyngeal retention with DD3 solids-- no sensation of pharygneal retention with pureed solids. With thin liquids- pt does have aspiration s/s with thin liquids by straw( less bolus controla nd also larger bolus size by straw) but with small single sips of thin liquids by cup rim- no overt s/s of aspiration. Pt is also noted to have mild delay in swallow intiation. Recommending that pt remain on current DD1 with thin liquids. Upright for all po, small single sips and bites, NO STRAWS, alternate solids and liquids, double swallow as needed, pace self- eat slowly. Pt's current level of functioning is below baseline and pt will benefit from skilled SLP intervention to improve safety of swallow for pt to tolerate least restrictive diet

## 2020-01-30 NOTE — PLAN OF CARE
OT: Shower and ADL completed. SBA seated for shower, pt walked to shower room with walker and CGA. ADL at a set up, CGA level. Dizziness and vision impacting performance but pt compensating well.

## 2020-01-31 ENCOUNTER — APPOINTMENT (OUTPATIENT)
Dept: PHYSICAL THERAPY | Facility: CLINIC | Age: 70
End: 2020-01-31
Payer: MEDICARE

## 2020-01-31 ENCOUNTER — APPOINTMENT (OUTPATIENT)
Dept: SPEECH THERAPY | Facility: CLINIC | Age: 70
End: 2020-01-31
Payer: MEDICARE

## 2020-01-31 ENCOUNTER — APPOINTMENT (OUTPATIENT)
Dept: OCCUPATIONAL THERAPY | Facility: CLINIC | Age: 70
End: 2020-01-31
Payer: MEDICARE

## 2020-01-31 LAB
COLLECT DURATION TIME SPEC: NORMAL H
CORTIS F 24H UR HPLC-MCNC: 76.1 UG/L
CORTIS F 24H UR-MRATE: NORMAL UG/D
CORTIS F/CREAT 24H UR: 35.9 UG/G CRT
CREAT 24H UR-MRATE: NORMAL MG/D (ref 500–1400)
CREAT UR-MCNC: 212 MG/DL
GLUCOSE BLDC GLUCOMTR-MCNC: 106 MG/DL (ref 70–99)
GLUCOSE BLDC GLUCOMTR-MCNC: 116 MG/DL (ref 70–99)
GLUCOSE BLDC GLUCOMTR-MCNC: 144 MG/DL (ref 70–99)
GLUCOSE BLDC GLUCOMTR-MCNC: 148 MG/DL (ref 70–99)
IMP & REVIEW OF LAB RESULTS: NORMAL
SPECIMEN VOL ?TM UR: NORMAL ML

## 2020-01-31 PROCEDURE — 25000132 ZZH RX MED GY IP 250 OP 250 PS 637: Mod: GY | Performed by: NURSE PRACTITIONER

## 2020-01-31 PROCEDURE — 97129 THER IVNTJ 1ST 15 MIN: CPT | Mod: GN | Performed by: SPEECH-LANGUAGE PATHOLOGIST

## 2020-01-31 PROCEDURE — 12800006 ZZH R&B REHAB

## 2020-01-31 PROCEDURE — 97535 SELF CARE MNGMENT TRAINING: CPT | Mod: GO | Performed by: STUDENT IN AN ORGANIZED HEALTH CARE EDUCATION/TRAINING PROGRAM

## 2020-01-31 PROCEDURE — 40000187 ZZH STATISTIC PATIENT MED CONFERENCE < 30 MIN: Performed by: STUDENT IN AN ORGANIZED HEALTH CARE EDUCATION/TRAINING PROGRAM

## 2020-01-31 PROCEDURE — 97530 THERAPEUTIC ACTIVITIES: CPT | Mod: GP | Performed by: STUDENT IN AN ORGANIZED HEALTH CARE EDUCATION/TRAINING PROGRAM

## 2020-01-31 PROCEDURE — 92526 ORAL FUNCTION THERAPY: CPT | Mod: GN | Performed by: SPEECH-LANGUAGE PATHOLOGIST

## 2020-01-31 PROCEDURE — 40000187 ZZH STATISTIC PATIENT MED CONFERENCE < 30 MIN: Performed by: SPEECH-LANGUAGE PATHOLOGIST

## 2020-01-31 PROCEDURE — 00000146 ZZHCL STATISTIC GLUCOSE BY METER IP

## 2020-01-31 PROCEDURE — 25000131 ZZH RX MED GY IP 250 OP 636 PS 637: Mod: GY | Performed by: NURSE PRACTITIONER

## 2020-01-31 PROCEDURE — 25000132 ZZH RX MED GY IP 250 OP 250 PS 637: Mod: GY | Performed by: PHYSICAL MEDICINE & REHABILITATION

## 2020-01-31 PROCEDURE — 97116 GAIT TRAINING THERAPY: CPT | Mod: GP | Performed by: STUDENT IN AN ORGANIZED HEALTH CARE EDUCATION/TRAINING PROGRAM

## 2020-01-31 PROCEDURE — 97112 NEUROMUSCULAR REEDUCATION: CPT | Mod: GP | Performed by: STUDENT IN AN ORGANIZED HEALTH CARE EDUCATION/TRAINING PROGRAM

## 2020-01-31 PROCEDURE — 40000183 ZZH STATISTIC PT MED CONFERENCE < 30 MIN: Performed by: STUDENT IN AN ORGANIZED HEALTH CARE EDUCATION/TRAINING PROGRAM

## 2020-01-31 PROCEDURE — 97130 THER IVNTJ EA ADDL 15 MIN: CPT | Mod: GN | Performed by: SPEECH-LANGUAGE PATHOLOGIST

## 2020-01-31 RX ORDER — HYDROXYZINE HYDROCHLORIDE 25 MG/1
25 TABLET, FILM COATED ORAL 3 TIMES DAILY PRN
Status: DISCONTINUED | OUTPATIENT
Start: 2020-01-31 | End: 2020-02-05 | Stop reason: HOSPADM

## 2020-01-31 RX ORDER — CYCLOBENZAPRINE HCL 5 MG
5 TABLET ORAL 3 TIMES DAILY PRN
Status: DISCONTINUED | OUTPATIENT
Start: 2020-01-31 | End: 2020-02-05 | Stop reason: HOSPADM

## 2020-01-31 RX ADMIN — ACETAMINOPHEN 650 MG: 325 TABLET, FILM COATED ORAL at 02:31

## 2020-01-31 RX ADMIN — MECLIZINE HCL 12.5 MG 12.5 MG: 12.5 TABLET ORAL at 09:12

## 2020-01-31 RX ADMIN — INSULIN ASPART 1 UNITS: 100 INJECTION, SOLUTION INTRAVENOUS; SUBCUTANEOUS at 12:06

## 2020-01-31 RX ADMIN — ACETAMINOPHEN 650 MG: 325 TABLET, FILM COATED ORAL at 21:08

## 2020-01-31 RX ADMIN — PANTOPRAZOLE SODIUM 40 MG: 40 TABLET, DELAYED RELEASE ORAL at 06:32

## 2020-01-31 RX ADMIN — DEXAMETHASONE 1 MG: 1 TABLET ORAL at 08:45

## 2020-01-31 RX ADMIN — ACETAMINOPHEN 650 MG: 325 TABLET, FILM COATED ORAL at 16:22

## 2020-01-31 RX ADMIN — INSULIN ASPART 1 UNITS: 100 INJECTION, SOLUTION INTRAVENOUS; SUBCUTANEOUS at 09:17

## 2020-01-31 RX ADMIN — LISINOPRIL 20 MG: 20 TABLET ORAL at 08:45

## 2020-01-31 RX ADMIN — HYDROXYZINE HYDROCHLORIDE 25 MG: 25 TABLET, FILM COATED ORAL at 21:08

## 2020-01-31 RX ADMIN — ACETAMINOPHEN 650 MG: 325 TABLET, FILM COATED ORAL at 12:06

## 2020-01-31 RX ADMIN — HYDROXYZINE HYDROCHLORIDE 25 MG: 25 TABLET, FILM COATED ORAL at 12:06

## 2020-01-31 RX ADMIN — MELATONIN 1000 UNITS: at 08:45

## 2020-01-31 RX ADMIN — ACETAMINOPHEN 650 MG: 325 TABLET, FILM COATED ORAL at 06:32

## 2020-01-31 RX ADMIN — ATORVASTATIN CALCIUM 20 MG: 20 TABLET, FILM COATED ORAL at 21:08

## 2020-01-31 ASSESSMENT — MIFFLIN-ST. JEOR: SCORE: 1046.82

## 2020-01-31 NOTE — PLAN OF CARE
FOCUS/GOAL  Bowel management, Bladder management, Pain management, Wound care management, Mobility, and Cognition/Memory/Judgment/Problem solving    ASSESSMENT, INTERVENTIONS AND CONTINUING PLAN FOR GOAL:    A&Ox4, Ax1 FWW. LBM 1/30/20, voiding in toilet without difficulty. Prn tylenol given for posterior neck and buttocks pains. Patient's posterior head incision appears swollen and fluid fill near top of incision. Patient reports increased pain at incision site over last couple days, sticky note left for provider.

## 2020-01-31 NOTE — CARE CONFERENCE
Acute Rehab Care Conference/Team Rounds      Type: Team Rounds    Present: Dr. Марина Potter, Mark Hartman NP, Leyla Li RN, Corrie Santacruz SW, Lilo Michael OT, Claudia Santana PT, Estefania Riddle SLP, Layne Myles , Abraham-O Susana-Ann Nathan, Hope Sheffield Dietician, Sheila Barraza Patient      Discharge Barriers/Treatment/Education    Rehab Diagnosis: pontomedullary ICH + suboccipital craniotomy for resection of brainstem cavernous malformation.    Active Medical Co-morbidities/Prognosis: wound care, pain, HTN, HLD, JIMENEZ, IBS, and h/o trigeminal neuralgia.     Safety: Pt is alert and oriented. Uses call light appropriately. No alarms.     Pain: Increased pain to incisional area. Noted swelling and tenderness to proximal end of incision. Tylenol q 4 hours.     Medications, Skin, Tubes/Lines: Medications taken whole with water. Skin intact with incision to posterior head/neck closed with sutures. No tubes or lines.     Swallowing/Nutrition:   Compelted clinical bedside swallow eval per MD orders. Pt demonstrates moderate oral pharyngeal dysphagia characterized by the following; sensation of numbness throuhout whole mouth and tongue along with oral motor weakenss which results in slower and discoordinated oral prep with DD2 solids as well as DD3 solids- pt also has some oral residue with both DD2 and DD3 solids - lngusl stasis but does not have this with pureed solids. Further pt did have some throat clearing with DD2 solids and sensation of pharyngeal retention with DD3 solids-- no sensation of pharygneal retention with pureed solids. With thin liquids- pt does have aspiration s/s with thin liquids by straw( less bolus controla nd also larger bolus size by straw) but with small single sips of thin liquids by cup rim- no overt s/s of aspiration. Pt is also noted to have mild delay in swallow intiation. Recommending that pt remain on current DD1 with thin liquids. Upright for all po, small  "single sips and bites, NO STRAWS, alternate solids and liquids, double swallow as needed, pace self- eat slowly. Will plan to trial DD2 items today but is likely not ready to advance diet  Bowel/Bladder: Continent of bladder, voiding without difficulty using toilet. Continent of bowel, last BM 1/30.    Psychosocial: . Pt and  live in a town house with 0 SERENA and one-level home. No STI. Pt plans to discharge to her dtr's home ([place], [place]) with 1 SERENA and pt will stay on the main level where there is a bedroom/bathroom located. Independent with all ADLs and IADLs PTA. \"I've never been sick\". Primary caregiver for  who is handicap and needs assistance. 1 dtr and 2 son. Good support from family. No reports of substance use or mental health.     ADLs/IADLs: Pt is SBA/CGA for ADL using walker for mobility. Using compensatory strategies for visual deficit and dizziness. Need to progress pt to MOD I with self care routine and hope to do this by the end of the weekend. Pt has DME at home and will need assist with IADLK/ caregiver responsibility of her . Recommending OP at discharge.     Mobility: Pt motivated, participates well, making steady progress. Nearing but not quite MOD I using FWW for transfers and gait. Needs vcs to slow down, ensure safe positioning before sit. Formal balance tests indicated increased risk for falls. Anticipate discharge FWW based at discharge, follow up with OP PT. OS 2/5/2020    Cognition/Language: Completed formal speech and language eval and informal cognitive assessment per MD orders- pt presents with mild dysarthria states that her mouth and tongue still feel numb- pt is mostly 100% intelligible but imprecision in speech is noted. Word retrieval/verbal expression, reading comprehension for complex info are WFL. Pt does have some minimal impairments in complex auditory comprehension but these seem more related to deficits in ST recall. Written expression is WFL. Pt " demonstrates mild- moderate impairments in cognition characterized by moderate deficits in ST memory, moderate impairments in flexible attention and mild impairments in numerical reasoning/ processing time. Pt will discharge to home - will need oversight and assistance with medication management, finances. Recommend OP sptx following d/c    Community Re-Entry: anticipate close SBA 2/2 vision and balance    Transportation: will need assist, transfer not a barrier    Plan of Care and goals reviewed and updated.    Discharge Plan/Recommendations    Fall Precautions: continue    Overall plan for the patient:   Making progress, is SBA to CGA for transfers and ADLs at this time but anticipate will be able to make Mod I in the room in the next day.  She has anxiety and poor confidence which is a barrier to progressing to Mod I today.  SLP also following, having ongoing dysphagia and planning for VFSS on Monday.  On track for discharge 2/5/20 with outpatient PT, OT, and SLP.       Utilization Review and Continued Stay Justification    Medical Necessity Criteria:    For any criteria that is not met, please document reason and plan for discharge, transfer, or modification of plan of care to address.    Requires intensive rehabilitation program to treat functional deficits?: Yes    Requires 3x per week or greater involvement of rehabilitation physician to oversee rehabilitation program?: Yes    Requires rehabilitation nursing interventions?: Yes    Patient is making functional progress?: Yes    There is a potential for additional functional progress? Yes    Patient is participating in therapy 3 hours per day a minimum of 5 days per week or 15 hours per week in 7 day period?:Yes    Has discharge needs that require coordinated discharge planning approach?:Yes      Barriers/Concerns related to meeting medical necessity criteria:  None    Team Plan to Address Concern:  N/A      Final Physician Sign off    Statement of Approval: I  have reviewed and agree with the recommendations and documentation in this care conference note.       Patient Goals  Social Work Goals:Plan for OP therapy. Pt has concerns that there are not many options for OP in her community. Therapy notified. Pt denied any SW needs or concerns. Pt family will be driving from [place] to pick pt up.     OT goal: hygiene/grooming: independent  OT goal: upper body dressing: Independent  OT goal: lower body dressing: Modified independent  OT goal: upper body bathing: Supervision/stand-by assist  OT goal: lower body bathing: Supervision/stand-by assist  OT goal: toilet transfer/toileting: Modified independent  OT goal: meal preparation: Modified independent  OT goal: home management: Modified independent  OT goal 1: Pt will demo SBA shower transfer using DME/AE prn  OT/NIELS face-to-face collaboration occurred, patient progress and plan of care reviewed.    PT Frequency: 60-90 minutes daily  PT goal: bed mobility: Independent, Rolling, Bridging, Within precautions, Supine to/from sit  PT goal: transfers: Bed to/from chair, Sit to/from stand, Assistive device(using FWW vs cane for home negotiation)  PT goal: gait: Supervision/stand-by assist, Modified independent, 50 feet, Greater than 200 feet, Assistive device, Rolling walker, Straight cane(mod I home, SBA for limited community distances)  PT goal: stairs: Supervision/stand-by assist, 4 stairs, Rail on right(for community access- not needed for dc home, 0STE or w/in home)  PT goal: perform aerobic activity with stable cardiovascular response: continuous activity, 15 minutes, NuStep, treadmill(for cardiovascualr health, progress to home use TM, OMNI no > 7/10)  PT goal 1: for improved functional mobility perform balance HEP ind w/ handout  PT Goal 2: following education state 3 or > fall prefvention strategies to demo knowledge of how to reduce falls risk  PT Goal 3: improve Burroughs from 34/56 to 40/56 or > to demo statistically improved falls  risk  PT Goal 4: demonstrate 3 or > energy conservation strategies to demo good fatigue/symptom mgmt        SLP Frequency: daily  SLP Swallow Goal:  Safely tolerate diet without signs/symptoms of aspiration: regular diet, thin liquids, with use of swallow precautions, independently  SLP goal 1: Pt will independenlty utilize dysarhtria strategies to improve speech intelligibility to 95%  SLP goal 2: Pt will indpenedently utilize attention and memory strategies to complete fleixble attention and St recall tasks with 90% accuracy  SLP goal 3: Pt will complete mod to complex level reasoning and problem solving tasks ( including numerics) with 90% accuracy

## 2020-01-31 NOTE — PLAN OF CARE
FOCUS/GOAL  Bladder management, Medication management, Pain management, Mobility, Cognition/Memory/Judgment/Problem solving, and Safety management    ASSESSMENT, INTERVENTIONS AND CONTINUING PLAN FOR GOAL:  Pt is alert and oriented with slurred speech. Continent of bladder, voiding without difficulty using toilet. Medications taken whole with water. Head/neck pain managed with Tylenol. Noted some swelling at the proximal end of the incision. Pt does report increased pain at the site. Pt up CGA with gait belt and walker. Pt appeared to be sleeping on rounds, wearing CPAP. Uses call light appropriately, able to make needs known. Will continue with POC.

## 2020-01-31 NOTE — PLAN OF CARE
Pt seen for swallow tx- at meal- trailed DD2 -- pt had csistent oral residue on tongue and right side and consistent sensation of pharyngeal retention and throat clearing/coughing on DD2. Pt then seen on current DD1 with thin liquids- intermittent throat clearing on DD1 textures, nectar thick textures- soup and also with thin. Chin down helped some but even with trial of this chin down strategy- still intermittent coughing. Also had pt trial a double swallow --difficulty fully achieving a 2nd swallow. Have requested orders to complete a VFSS but will not be able to be scheduled until Monday. Recommend cautiously continues with current diet of DD1 with thin liquids ( as pt's swallow appears worse today from  previous 2 days)- if continued positive s/s of aspiration continue then consider making NPO until VFSS on Monday. Continue with safe swallow strategies of slow rate, small bits/sips, alternate consistencies, chin tuck as able, double swallow as able

## 2020-01-31 NOTE — PROGRESS NOTES
"  [facility]   Acute Rehabilitation Unit  Daily progress note    INTERVAL HISTORY  Sheila is seen in team rounds.  She has no new concerns or complaints today and feels she is improving, but nursing reports state she was having increased tightness in the neck and requesting a muscle relaxer.  I examined her incision today and it appears very similar, if not slightly better, compared to two days ago when I saw it last.  She does endorse anxiety during therapies in which she describes \"shakiness\" of her hands.  She denies any prior anxiety symptoms.  Functionally is close to Mod I in the room, however SLP is noticing difficulty with swallowing and VFSS will be ordered.  For full functional update, see team rounds note from today.      MEDICATIONS  Scheduled meds    atorvastatin  20 mg Oral or Feeding Tube QPM     dexamethasone  1 mg Oral Daily     insulin aspart  1-7 Units Subcutaneous TID AC     insulin aspart  1-5 Units Subcutaneous At Bedtime     lisinopril  20 mg Oral Daily     methylcellulose  500 mg Oral Daily     pantoprazole  40 mg Oral QAM AC     Vitamin D3  1,000 Units Oral Daily       PRN meds:  acetaminophen, calcium carbonate, cyclobenzaprine, glucose **OR** dextrose **OR** glucagon, hydrOXYzine, magic mouthwash suspension (diphenhydrAMINE, lidocaine, aluminum-magnesium & simethicone), meclizine      PHYSICAL EXAM  /71   Pulse 91   Temp 97  F (36.1  C) (Oral)   Resp 16   Ht 1.537 m (5' 0.5\")   Wt 60.2 kg (132 lb 12.8 oz)   LMP  (LMP Unknown)   SpO2 97%   BMI 25.51 kg/m    Gen: pleasant,NAD  HEENT: craniotomy incision site C/D/I, closed with sutures, fluid at the proximal end still present but appears slightly improved.  No warmth or erythema noted.  Cardio: RRR, S1+S2, no m/r/g  Pulm: non-labored, CTA b/l, no w/r/r  Abd: soft, BS x4, NT, ND  Ext: no edema B/I, no calf tenderness B/I  Neuro/MSK: Alert and following commands and answering questions " appropriately    LABS  CBC RESULTS:   Recent Labs   Lab Test 01/30/20  0606   WBC 10.0   RBC 3.53*   HGB 11.8   HCT 33.7*   MCV 96   MCH 33.4*   MCHC 35.0   RDW 12.5        Last Comprehensive Metabolic Panel:  Sodium   Date Value Ref Range Status   01/30/2020 135 133 - 144 mmol/L Final     Potassium   Date Value Ref Range Status   01/30/2020 3.9 3.4 - 5.3 mmol/L Final     Chloride   Date Value Ref Range Status   01/30/2020 102 94 - 109 mmol/L Final     Carbon Dioxide   Date Value Ref Range Status   01/30/2020 27 20 - 32 mmol/L Final     Anion Gap   Date Value Ref Range Status   01/30/2020 6 3 - 14 mmol/L Final     Glucose   Date Value Ref Range Status   01/30/2020 112 (H) 70 - 99 mg/dL Final     Urea Nitrogen   Date Value Ref Range Status   01/30/2020 16 7 - 30 mg/dL Final     Creatinine   Date Value Ref Range Status   01/30/2020 0.76 0.52 - 1.04 mg/dL Final     GFR Estimate   Date Value Ref Range Status   01/30/2020 79 >60 mL/min/[1.73_m2] Final     Comment:     Non  GFR Calc  Starting 12/18/2018, serum creatinine based estimated GFR (eGFR) will be   calculated using the Chronic Kidney Disease Epidemiology Collaboration   (CKD-EPI) equation.       Calcium   Date Value Ref Range Status   01/30/2020 8.4 (L) 8.5 - 10.1 mg/dL Final       Recent Labs   Lab 01/31/20  1205 01/31/20  0916 01/30/20  2200 01/30/20  1743 01/30/20  1127 01/30/20  0732 01/30/20  0606  01/28/20  0602  01/27/20  0724  01/25/20  0717   GLC  --   --   --   --   --   --  112*  --  131*  --  142*  --  158*   * 148* 97 98 123* 115*  --    < >  --    < >  --    < >  --     < > = values in this interval not displayed.       ASSESSMENT AND PLAN    Sheila Barraza is a 69 year old right hand dominant female with brainstem cavernoma status post hemorrhage now resected via suboccipital craniotomy on 1/22/20. PMHx HTN, IBS, HLD, trigeminal neuralgia s/p two rhizotomies at [facility] on the left side, inguinal hernia repair, and  JIMENEZ.  Hospital course complicated by  a fall.  Admitted to acute inpatient rehab 01/28/20     Impairment group code: 01.4 No Paresis Stroke: pontomedullary ICH + suboccipital craniotomy for resection of brainstem cavernous malformation        1. PT, OT and SLP 60 minutes of each on a daily basis, in addition to rehab nursing and close management of physiatrist.       2. Impairment of ADL's:  OT for 60 min daily to work on upper and lower body self care, dressing, toileting, bathing, energy conservation techniques with use of ADs as needed.      3. Impairment of mobility:   PT for 60 min daily to work on gait exercises, strengthening, endurance buildup, transfers with use of walker as needed.      4. Impairment of cognition/language/swallow:   SLP for 60 min daily for cognitive evaluation and treatment strategies for higher level cognitive deficits and memory impairment.      5. Rehab RN to administer medication, patient education on medication taking, VS monitoring, bowel regimen, glucose monitoring and wound care/surgical wound dressing changes and monitoring. .     1. Medical Conditions  #Pontomeduallry ICH, ICH Score = 1, NIHSS = 0  #Brainstem cavernoma s/p resection vis suboccipital craniotomy 1/22/20  - Decadron, taper over one week, sliding scale insulin while on decadron.  Per discussion with discharging team, continue Decadron 1 mg for total of 7 days (ending 2/5)  - SBP Goal < 140    - wound care; ok to remove sutures in 2 weeks  - leukocytosis most likely due to steroids and demargination; Re-checked on 1/30 and now returned to normal values    PT/OT/Speech      #Trigeminal Neuralgia  Prior balloon rhizotomies x2 at the [facility], on the left side. Left sensory deficit      #HLD  -Atorvastatin 20 mg daily     # Hypertension  - SBP goals of < 140   - lisinopril 20 mg daily     #Wound Care: posterior head, S/P craniotomy,  Sutures are not absorbable and need to be removed in 2 weeks. If patient still at  rehab by this time, the sutures may be removed by the rehab physician,if considers that the wound has healed completely. Sutures should be removed on post-operative day #14 (2/5/2020).  -monitor for infection      #JIMENEZ  Pt has been using CPAP for ~7 years   - Continue home home CPAP     # IBS   - PRN bowel regimen and scheduled Citrucel      # Neurogenic bowel and bladder: started after surgery. no sensation when defecating or urinating. She has impaired sensation at genital area and buttocks.   - UA on 1/26 due to urgency with result neg      # H/o Fall on POD #1 the patient fell and hit her head while attempted to clean herself on the toilet. A Head CT and C-Spine CT were obtained and were negative for acute pathology    #Anxiety  -Add Atarax PRN        2. Adjustment to disability:  Clinical psychology to eval and treat  3. FEN: DD1 diet w/ thin liquids.  VFSS on Monday  4. Bowel: Continent BM 1/30  5. Bladder: PVRs x3 WNL, may check PRN only   6. DVT Prophylaxis: mechanical, declined heparin, wants to walk.  7. GI Prophylaxis: protonix  8. Code: Full  9. Disposition: home   10. ELOS:  2/5/20.  11. Rehab prognosis:  anticipate progression  12. Follow up Appointments on Discharge:               -Neurosurgery Clinic in 10-14 days for wound evaluation.              - Dr. Prabhakar in Neurosurgery Clinic in ~3 months.prior Once discharged from rehab but prior to returning to [place].  Please page Anna Holloway CNP @ [phone] or [phone] once discharge is anticipated for coordination of appointment scheduling              - Primary Care Provider in 7 days    Braulio Potter MD  Department of Rehabilitation Medicine  Pager: [phone]    Time Spent on this Encounter   I, Braulio Potter, spent a total of 35 minutes face-to-face or managing the care of Sheila Barraza. Over 50% of my time on the unit was spent counseling the patient and coordinating care. See note for details.

## 2020-01-31 NOTE — PROGRESS NOTES
01/31/20 1645   Signing Clinician's Name / Credentials   Signing clinician's name / credentials Estefania Riddle MS/CCC-SLP   Quick Adds   Rehab Discipline SLP   Additional Documentation   SLP Plan SLP: see or SLP plan for swallowing; compelte WJ-R visual auditory learning or Rbans; continue with memory , flexible attn, numerial reasoning and higher level reasoning tasks   Total Session Time   Total Session Time (minutes) 30 minutes   ARC or TCU Only   What unit is patient on? Acute Rehab   SLP - Acute Rehab Center Time   Individual Time (minutes) - enter zero if not applicable - SLP 30  (30 cognitive skills)   Group Time (minutes) - enter zero if not applicable  - SLP 0   Concurrent Time (minutes) - enter zero if not applicable  - SLP 0   Co-Treatment Time (minutes) - enter zero if not applicable  - SLP 0   ARC Total Session Time (minutes) - SLP 30   Problem Solving   Functional Performance Needs help to solve routine problems less than 10% of the time   Problem Solving Comment SLP: completed WJ-R Verba;l Analogies - complex assessment of reasoning- pt scored a raw score of 11 and a percentile ranko f 34-- which is a low average score

## 2020-01-31 NOTE — PROGRESS NOTES
A &O x4, able to communicate needs. Denies SOB, chest pain, nausea, dizziness. Complained of headache pain and requested tylenol x1 w/ relief per pt. SBA w/ walker. DD1 diet w/ thin liquids. Incision NIELS. Continent of bowel and bladder, LBM 1/30. , 144. Also requested atarax and antivert x1 today. Pleasant and cooperative. Will continue with plan of care.

## 2020-02-01 ENCOUNTER — APPOINTMENT (OUTPATIENT)
Dept: OCCUPATIONAL THERAPY | Facility: CLINIC | Age: 70
End: 2020-02-01
Payer: MEDICARE

## 2020-02-01 ENCOUNTER — APPOINTMENT (OUTPATIENT)
Dept: SPEECH THERAPY | Facility: CLINIC | Age: 70
End: 2020-02-01
Payer: MEDICARE

## 2020-02-01 ENCOUNTER — APPOINTMENT (OUTPATIENT)
Dept: PHYSICAL THERAPY | Facility: CLINIC | Age: 70
End: 2020-02-01
Payer: MEDICARE

## 2020-02-01 LAB
GLUCOSE BLDC GLUCOMTR-MCNC: 124 MG/DL (ref 70–99)
GLUCOSE BLDC GLUCOMTR-MCNC: 126 MG/DL (ref 70–99)
GLUCOSE BLDC GLUCOMTR-MCNC: 132 MG/DL (ref 70–99)
GLUCOSE BLDC GLUCOMTR-MCNC: 99 MG/DL (ref 70–99)

## 2020-02-01 PROCEDURE — 00000146 ZZHCL STATISTIC GLUCOSE BY METER IP

## 2020-02-01 PROCEDURE — 25000132 ZZH RX MED GY IP 250 OP 250 PS 637: Mod: GY | Performed by: PHYSICAL MEDICINE & REHABILITATION

## 2020-02-01 PROCEDURE — 97110 THERAPEUTIC EXERCISES: CPT | Mod: GP | Performed by: STUDENT IN AN ORGANIZED HEALTH CARE EDUCATION/TRAINING PROGRAM

## 2020-02-01 PROCEDURE — 97116 GAIT TRAINING THERAPY: CPT | Mod: GP | Performed by: STUDENT IN AN ORGANIZED HEALTH CARE EDUCATION/TRAINING PROGRAM

## 2020-02-01 PROCEDURE — 25000131 ZZH RX MED GY IP 250 OP 636 PS 637: Mod: GY | Performed by: NURSE PRACTITIONER

## 2020-02-01 PROCEDURE — 25000132 ZZH RX MED GY IP 250 OP 250 PS 637: Mod: GY | Performed by: NURSE PRACTITIONER

## 2020-02-01 PROCEDURE — 92526 ORAL FUNCTION THERAPY: CPT | Mod: GN | Performed by: SPEECH-LANGUAGE PATHOLOGIST

## 2020-02-01 PROCEDURE — 97530 THERAPEUTIC ACTIVITIES: CPT | Mod: GP | Performed by: STUDENT IN AN ORGANIZED HEALTH CARE EDUCATION/TRAINING PROGRAM

## 2020-02-01 PROCEDURE — 97150 GROUP THERAPEUTIC PROCEDURES: CPT | Mod: GO | Performed by: OCCUPATIONAL THERAPIST

## 2020-02-01 PROCEDURE — 12800006 ZZH R&B REHAB

## 2020-02-01 PROCEDURE — 97150 GROUP THERAPEUTIC PROCEDURES: CPT | Mod: GP | Performed by: STUDENT IN AN ORGANIZED HEALTH CARE EDUCATION/TRAINING PROGRAM

## 2020-02-01 RX ADMIN — ATORVASTATIN CALCIUM 20 MG: 20 TABLET, FILM COATED ORAL at 21:40

## 2020-02-01 RX ADMIN — ACETAMINOPHEN 650 MG: 325 TABLET, FILM COATED ORAL at 11:03

## 2020-02-01 RX ADMIN — HYDROXYZINE HYDROCHLORIDE 25 MG: 25 TABLET, FILM COATED ORAL at 07:16

## 2020-02-01 RX ADMIN — PANTOPRAZOLE SODIUM 40 MG: 40 TABLET, DELAYED RELEASE ORAL at 06:59

## 2020-02-01 RX ADMIN — ACETAMINOPHEN 650 MG: 325 TABLET, FILM COATED ORAL at 06:59

## 2020-02-01 RX ADMIN — MELATONIN 1000 UNITS: at 08:02

## 2020-02-01 RX ADMIN — ACETAMINOPHEN 650 MG: 325 TABLET, FILM COATED ORAL at 21:40

## 2020-02-01 RX ADMIN — LISINOPRIL 20 MG: 20 TABLET ORAL at 08:02

## 2020-02-01 RX ADMIN — ACETAMINOPHEN 650 MG: 325 TABLET, FILM COATED ORAL at 15:17

## 2020-02-01 RX ADMIN — HYDROXYZINE HYDROCHLORIDE 25 MG: 25 TABLET, FILM COATED ORAL at 21:40

## 2020-02-01 RX ADMIN — DEXAMETHASONE 1 MG: 1 TABLET ORAL at 08:02

## 2020-02-01 ASSESSMENT — MIFFLIN-ST. JEOR: SCORE: 1049.99

## 2020-02-01 NOTE — PLAN OF CARE
SLP: pt seen for meal, NDD1/thin fluids.  No coughing noted, occasional slight throat clear/wet voice - pt following strategies for small amounts, slow pace,extra swallows PRN  -45 pm , pt cancelled stating she was tired/wanting to nap

## 2020-02-01 NOTE — PLAN OF CARE
FOCUS/GOAL  Bowel management, Bladder management, Skin integrity and Cognition/Memory/Judgment/Problem solving    ASSESSMENT, INTERVENTIONS AND CONTINUING PLAN FOR GOAL:  Pt is alert and oriented x4, provided tylenol and atarax in am by pt request for pain and anxiety, sitting on edge of bed at start of shift, slurred speech, sleeping well during the night, using home CPAP, no reports of fever, chills, CP, SOB, N/V, abdominal pain, or new weakness/numbness/tingling, continent of bowel and bladder, transferring with assist of 1 and ww, no tubes, lines or drains,no further care concerns at this time continue with POC.

## 2020-02-01 NOTE — PROGRESS NOTES
"  [facility]   Acute Rehabilitation Unit  Daily progress note    INTERVAL HISTORY  Sheila was seen and examined at bedside. She reports that her eyes are improving today from yesterday and that she thinks its from advil. She has no chest pain or shortness of breath. She has left frontal headache. She feels that her swallowing is improving.    Functionally, pt is Pt very close to MOD I during the day.    MEDICATIONS  Scheduled meds    - Medication Assessment Program - Rehab Services   Does not apply See Admin Instructions     atorvastatin  20 mg Oral or Feeding Tube QPM     dexamethasone  1 mg Oral Daily     insulin aspart  1-7 Units Subcutaneous TID AC     insulin aspart  1-5 Units Subcutaneous At Bedtime     lisinopril  20 mg Oral Daily     methylcellulose  500 mg Oral Daily     pantoprazole  40 mg Oral QAM AC     Vitamin D3  1,000 Units Oral Daily       PRN meds:  acetaminophen, calcium carbonate, cyclobenzaprine, glucose **OR** dextrose **OR** glucagon, hydrOXYzine, magic mouthwash suspension (diphenhydrAMINE, lidocaine, aluminum-magnesium & simethicone), meclizine      PHYSICAL EXAM  /60   Pulse 73   Temp 96.9  F (36.1  C) (Oral)   Resp 17   Ht 1.537 m (5' 0.5\")   Wt 60.6 kg (133 lb 8 oz)   LMP  (LMP Unknown)   SpO2 99%   BMI 25.64 kg/m    Gen: pleasant,NAD  HEENT: craniotomy incision site C/D/I, closed with sutures.  No warmth or erythema noted.  Cardio: RRR, S1+S2, no m/r/g  Pulm: non-labored, CTA b/l, no w/r/r  Abd: NT  Ext: no edema B/I, no calf tenderness B/I  Neuro/MSK: Alert and following commands and answering questions appropriately    LABS  CBC RESULTS:   Recent Labs   Lab Test 01/30/20  0606   WBC 10.0   RBC 3.53*   HGB 11.8   HCT 33.7*   MCV 96   MCH 33.4*   MCHC 35.0   RDW 12.5        Last Comprehensive Metabolic Panel:  Sodium   Date Value Ref Range Status   01/30/2020 135 133 - 144 mmol/L Final     Potassium   Date Value Ref Range Status "   01/30/2020 3.9 3.4 - 5.3 mmol/L Final     Chloride   Date Value Ref Range Status   01/30/2020 102 94 - 109 mmol/L Final     Carbon Dioxide   Date Value Ref Range Status   01/30/2020 27 20 - 32 mmol/L Final     Anion Gap   Date Value Ref Range Status   01/30/2020 6 3 - 14 mmol/L Final     Glucose   Date Value Ref Range Status   01/30/2020 112 (H) 70 - 99 mg/dL Final     Urea Nitrogen   Date Value Ref Range Status   01/30/2020 16 7 - 30 mg/dL Final     Creatinine   Date Value Ref Range Status   01/30/2020 0.76 0.52 - 1.04 mg/dL Final     GFR Estimate   Date Value Ref Range Status   01/30/2020 79 >60 mL/min/[1.73_m2] Final     Comment:     Non  GFR Calc  Starting 12/18/2018, serum creatinine based estimated GFR (eGFR) will be   calculated using the Chronic Kidney Disease Epidemiology Collaboration   (CKD-EPI) equation.       Calcium   Date Value Ref Range Status   01/30/2020 8.4 (L) 8.5 - 10.1 mg/dL Final       Recent Labs   Lab 02/01/20  0759 01/31/20  2108 01/31/20  1730 01/31/20  1205 01/31/20  0916 01/30/20  2200  01/30/20  0606  01/28/20  0602  01/27/20  0724   GLC  --   --   --   --   --   --   --  112*  --  131*  --  142*   BGM 99 116* 106* 144* 148* 97   < >  --    < >  --    < >  --     < > = values in this interval not displayed.       ASSESSMENT AND PLAN    Sheila Barraza is a 69 year old right hand dominant female with brainstem cavernoma status post hemorrhage now resected via suboccipital craniotomy on 1/22/20. PMHx HTN, IBS, HLD, trigeminal neuralgia s/p two rhizotomies at [facility] on the left side, inguinal hernia repair, and JIMENEZ.  Hospital course complicated by  a fall.  Admitted to acute inpatient rehab 01/28/20     Impairment group code: 01.4 No Paresis Stroke: pontomedullary ICH + suboccipital craniotomy for resection of brainstem cavernous malformation        1. PT, OT and SLP 60 minutes of each on a daily basis, in addition to rehab nursing and close management of  physiatrist.       2. Impairment of ADL's:  OT for 60 min daily to work on upper and lower body self care, dressing, toileting, bathing, energy conservation techniques with use of ADs as needed.      3. Impairment of mobility:   PT for 60 min daily to work on gait exercises, strengthening, endurance buildup, transfers with use of walker as needed.      4. Impairment of cognition/language/swallow:   SLP for 60 min daily for cognitive evaluation and treatment strategies for higher level cognitive deficits and memory impairment.      5. Rehab RN to administer medication, patient education on medication taking, VS monitoring, bowel regimen, glucose monitoring and wound care/surgical wound dressing changes and monitoring. .     1. Medical Conditions  #Pontomeduallry ICH, ICH Score = 1, NIHSS = 0  #Brainstem cavernoma s/p resection vis suboccipital craniotomy 1/22/20  - Decadron, taper over one week, sliding scale insulin while on decadron.  Per discussion with discharging team, continue Decadron 1 mg for total of 7 days (ending 2/5)  - SBP Goal < 140    - wound care; ok to remove sutures in 2 weeks  - leukocytosis most likely due to steroids and demargination; Re-checked on 1/30 and now returned to normal values    PT/OT/Speech      #Trigeminal Neuralgia  Prior balloon rhizotomies x2 at the [facility], on the left side. Left sensory deficit      #HLD  -Atorvastatin 20 mg daily     # Hypertension  - SBP goals of < 140   - lisinopril 20 mg daily     #Wound Care: posterior head, S/P craniotomy,  Sutures are not absorbable and need to be removed in 2 weeks. If patient still at rehab by this time, the sutures may be removed by the rehab physician,if considers that the wound has healed completely. Sutures should be removed on post-operative day #14 (2/5/2020).  -monitor for infection      #JIMENEZ  Pt has been using CPAP for ~7 years   - Continue home home CPAP     # IBS   - PRN bowel regimen and scheduled Citrucel      #  Neurogenic bowel and bladder: started after surgery. no sensation when defecating or urinating. She has impaired sensation at genital area and buttocks.   - UA on 1/26 due to urgency with result neg      # H/o Fall on POD #1 the patient fell and hit her head while attempted to clean herself on the toilet. A Head CT and C-Spine CT were obtained and were negative for acute pathology    #Anxiety  -Add Atarax PRN        2. Adjustment to disability:  Clinical psychology to eval and treat  3. FEN: DD1 diet w/ thin liquids.  VFSS on Monday  4. Bowel: Continent BM 1/30  5. Bladder: PVRs x3 WNL, may check PRN only   6. DVT Prophylaxis: mechanical, declined heparin, wants to walk.  7. GI Prophylaxis: protonix  8. Code: Full  9. Disposition: home   10. ELOS:  2/5/20.  11. Rehab prognosis:  anticipate progression  12. Follow up Appointments on Discharge:               -Neurosurgery Clinic in 10-14 days for wound evaluation.              - Dr. Prabhakar in Neurosurgery Clinic in ~3 months.prior Once discharged from rehab but prior to returning to [place].  Please page nAna Holloway, ARASELI @ [phone] or [phone] once discharge is anticipated for coordination of appointment scheduling              - Primary Care Provider in 7 days      Pt care, assessment, and plan, discussed with attending physician Dr. Mary Fenton, DO  PM&R Resident, PGY-2  PGR: [phone]

## 2020-02-01 NOTE — PROGRESS NOTES
D:  Referred by OT to speak with patient about transportation options in her home area of [place], [place].    I:  Writer met with patient and provided transport services in her city and outlying area for further distances along with community information.  A:  Patient in good spirits.  She didn't feel transport information was needed since her initial plan is to stay with her daughter in [place], [place] which is about 15 miles from [place] where outpatient therapy services are located.  She was willing to accept the information just in case she has a need when she returns to her own home.   P:  SW available as needed.

## 2020-02-01 NOTE — PROGRESS NOTES
Pt attended Falls Prevention class today with group of 5 patients. Pt selected for class due to documented gait deficit and falls risk. Class includes education in falls risks, how to decrease that risk through behavior and home modifications and energy conservation; and instruction in available equipment designed to increase home safety. Pt was able to verbalize understanding of materials and participated appropriately in the discussion and problem-solving segments of the class.   Pt was instructed she will be quizzed later in order to demonstrate comprehension of material.

## 2020-02-01 NOTE — PLAN OF CARE
FOCUS/GOAL  Bowel management, Bladder management, Medication management and Wound care management    ASSESSMENT, INTERVENTIONS AND CONTINUING PLAN FOR GOAL:     Pt is A&Ox4. VSS. Effective pain management with prn  Tylenol every 4 hrs per order. received atarax x1. Denied sob, denied difficulty breathing, denied chest pain. Incision to posterior back CDI.Good appetite and oral hydration this shift. BGs were 106 and 116.NO BM this shift.  LBM 1/31/20. Active bs to all quadrants. Passing gas per pt report. Urinating using toilet in bathroom. CGA with walker to transfer. CPAP on. Using call light to make needs known, call light with in reach. Will continue to monitor.

## 2020-02-01 NOTE — PROGRESS NOTES
"OT: Pt participated in the established \"Transitions Group\" for stroke pts. Highlighting topics such as return to meaningful activities, health/wellness, fatigue, depression/anxiety, bowel/bladder considerations w/ community re-integration and caregiver wellness/support. Pt very receptive to class. Pt reports personal goals after discharge are to care for spouse and return to driving.    "

## 2020-02-01 NOTE — CONSULTS
Health Psychology                  Clinic    Department of Medicine  Katherine Gaitan, Ph.D., L.P. [phone]                          Cl[facility]  [facility] Samantha Oakley, Ph.D.,  L.P. [phone]                 3rd Floor  [place] [address]   Dixie Davila, Ph.D., L.P. [phone]    [address] Dede Anderson, Ph.D., L.P. [phone]            [place] [address]          Jose De Jesus Love, Ph.D., A.PARESH.[email or web address].SAMUEL., L.P. [phone]      Estefania Martinez, Ph.D., L.P. [phone]      Inpatient Health Psychology Consultation*    Consult Date:  01/31/2020      REFERRAL SOURCE:  PM and R providers, Acute Rehabilitation Center, [facility].      REASON FOR REFERRAL:  Sheila Barraza is a 69-year-old woman currently on the Acute Rehabilitation Center at [facility].  Ms. Barraza has a history of brainstem cavernoma originally diagnosed in 2015, status post hemorrhage, now resected via suboccipital craniotomy on 01/22/2020.  Ms. Barraza also has a history of trigeminal neuralgia, status post 2 rhizotomies at the [facility] on the left side of her face.  Ms. Barraza has endorsed some anxiety symptoms with her medical providers and rehabilitation therapists.  This evaluation was requested to assess her current emotional status and to make treatment recommendations.      HISTORY OF PRESENT ILLNESS:  Ms. Barraza was very welcoming of this contact.  She reports that her premorbid, baseline mood is typically very upbeat, positive and optimistic.  She denies any significant history of depression, anxiety or other emotional issues that have affected her functioning.  She also tells me that she is feeling no anxiety or depression currently.  This is in contrast to reports during rehabilitation therapies in which she becomes very shaky and  "notes physical reactions.  She [facility] regarding worry about whether they will be resources for outpatient rehabilitation therapies in her community.  Within this evaluation today, Ms. Barraza also noted underlying anxiety that she states is always in the back of her mind about the probability, in her words, of the trigeminal neuralgia returning; she tells me that she has been informed that trigeminal neuralgia \"always\" recurs several years after rhizotomies are done even when as completely effective as hers apparently have been.      Ms. Barraza is focused in this conversation on assuring me of her complete ability to let go of any concerns because of her deep tod and reliance on her relationship with Riaz.  She was immediately focused on wanting to have me hear a song that she listens to that provides her with great benefit and comfort.  She reported that she gets significant support from her tod community and that her own  has been in touch with her since this hospitalization.  She also notes the support of her daughter and son-in-law; her son-in-law is a medical provider (either PA or NP, she used both in terms to describe him) in a Neurology practice in [place].      Ms. Barraza reports that she sees herself making progress in her rehabilitation therapies.  She reports good sleep with nightly use of her own CPAP machine.  She notes that she was disturbed a couple of nights ago by noise from another patient's room, but typically has no difficulty sleeping.  She notes that she is pushing herself to eat, although she does find the dietary restrictions somewhat of a limiting factor, but feels she is doing well with nutrition.      Ms. Barraza was very clear that she does not believe she needs additional support from Health Psychology during this rehabilitation admission.      I note that she has been evaluated with some clear cognitive impairments.  " "These include impairments in short-term memory, flexible attention, and numerical reasoning and processing time.  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  She has been recommended to have oversight with medication management and finances on discharge.      SOCIAL HISTORY:  Ms. 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  They have 3 adult children and 7 grandchildren.  Their children are very supportive.  Ms. Barraza's  has been disabled for many years secondary to an autoimmune disorder, although she notes that he is cognitive \"completely sharp.\"  She has been his primary caregiver and plans to discharge to her daughter's home approximately 15 miles away from their own home until she is able to provide ongoing care for him again.  As noted above, Ms. Barraza has a very deep tod that she uses in a very central way in all aspects of her life.      MEDICAL HISTORY:  Please see [facility] for more complete information on her medical history, current admission and status, and all medications.      PSYCHIATRIC HISTORY:  As above in HPI.  Ms. Barraza denies any significant psychiatric history historically or current concerns.      BEHAVIORAL OBSERVATIONS:  Ms. Barraza presents for this evaluation resting in her room in the [facility] between scheduled rehabilitation therapies.  She was alert and oriented and welcoming of this contact.  She reported her current mood as \"great\" in contrast to information that she has provided to both rehabilitation therapists and medical providers as well as the [facility]  about specific areas that trigger anxiety.  Speech was slightly dysarthric, but very easy to understand.  Insight and judgment appear to be intact.  She exhibited no " evidence of distortions of thought or perception.      IMPRESSIONS AND PLAN:  Sheila Barraza is a 69-year-old woman who recently underwent a suboccipital craniotomy for resection of a brainstem cavernous malformation in the context of an intracerebral hemorrhage (ICH) that was triggering multiple physical symptoms.  She also has a history of trigeminal neuralgia and has undergone 2 rhizotomies that have been very effective in eliminating that pain.  Ms. Barraza denied all emotional concerns to me during this evaluation.  This is in significant contrast to observations of rehabilitation therapists as well as comments that she has made to multiple people since her [facility] admission about specific topics that trigger worry.  She states that she is sleeping well with use of her home CPAP, which she uses quite faithfully every night.  She is motivated within her rehabilitation therapies and is able to observe herself making positive progress.  Given her lack of concern and her good participation, it appears that she may not benefit from additional support available through Health Psychology.  She is aware that I remain available should she feel that she would benefit from additional contact.      DIAGNOSES:   1.  Adjustment disorder with anxiety (F43.22).   2.  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         ALDA FARRIS, PHD, LP         *In accordance with the Rules of the [place] Board of [employer], it is noted that psychological descriptions and scientific procedures underlying psychological evaluations have limitations.  Absolute predictions cannot be made based on information in this report.         D: 2020   T: 2020   MT:       Name:     SHEILA BARRAZA   MRN:      [id number]        Account:       [id number]   :      1950           Consult Date:  2020      Document: [id number]       cc: DILEEP PEREZ NP

## 2020-02-01 NOTE — PLAN OF CARE
FOCUS/GOAL  Medication management, Mobility, and Safety management    ASSESSMENT, INTERVENTIONS AND CONTINUING PLAN FOR GOAL:  Pt is A&Ox4, VSS and denies any pain. Pt was changed to MOD I w/ walker during day, this shift. Pt continent using toilet. Pt has slurred speech but is easily able to communicate. Pt has incision on occiput that is approximated w/ sutures, NIELS and free of bleeding/drainage or Sx of infection. Pt was started on MAP program this shift.

## 2020-02-02 ENCOUNTER — APPOINTMENT (OUTPATIENT)
Dept: OCCUPATIONAL THERAPY | Facility: CLINIC | Age: 70
End: 2020-02-02
Payer: MEDICARE

## 2020-02-02 ENCOUNTER — APPOINTMENT (OUTPATIENT)
Dept: SPEECH THERAPY | Facility: CLINIC | Age: 70
End: 2020-02-02
Payer: MEDICARE

## 2020-02-02 ENCOUNTER — APPOINTMENT (OUTPATIENT)
Dept: PHYSICAL THERAPY | Facility: CLINIC | Age: 70
End: 2020-02-02
Payer: MEDICARE

## 2020-02-02 LAB
GLUCOSE BLDC GLUCOMTR-MCNC: 103 MG/DL (ref 70–99)
GLUCOSE BLDC GLUCOMTR-MCNC: 106 MG/DL (ref 70–99)
GLUCOSE BLDC GLUCOMTR-MCNC: 112 MG/DL (ref 70–99)
GLUCOSE BLDC GLUCOMTR-MCNC: 114 MG/DL (ref 70–99)

## 2020-02-02 PROCEDURE — 25000131 ZZH RX MED GY IP 250 OP 636 PS 637: Mod: GY | Performed by: NURSE PRACTITIONER

## 2020-02-02 PROCEDURE — 25000132 ZZH RX MED GY IP 250 OP 250 PS 637: Mod: GY | Performed by: PHYSICAL MEDICINE & REHABILITATION

## 2020-02-02 PROCEDURE — 25000132 ZZH RX MED GY IP 250 OP 250 PS 637: Mod: GY | Performed by: NURSE PRACTITIONER

## 2020-02-02 PROCEDURE — 97535 SELF CARE MNGMENT TRAINING: CPT | Mod: GO

## 2020-02-02 PROCEDURE — 00000146 ZZHCL STATISTIC GLUCOSE BY METER IP

## 2020-02-02 PROCEDURE — 97150 GROUP THERAPEUTIC PROCEDURES: CPT | Mod: GP

## 2020-02-02 PROCEDURE — 97110 THERAPEUTIC EXERCISES: CPT | Mod: GP | Performed by: PHYSICAL THERAPIST

## 2020-02-02 PROCEDURE — 92526 ORAL FUNCTION THERAPY: CPT | Mod: GN | Performed by: SPEECH-LANGUAGE PATHOLOGIST

## 2020-02-02 PROCEDURE — 12800006 ZZH R&B REHAB

## 2020-02-02 RX ADMIN — HYDROXYZINE HYDROCHLORIDE 25 MG: 25 TABLET, FILM COATED ORAL at 09:20

## 2020-02-02 RX ADMIN — HYDROXYZINE HYDROCHLORIDE 25 MG: 25 TABLET, FILM COATED ORAL at 20:27

## 2020-02-02 RX ADMIN — ACETAMINOPHEN 650 MG: 325 TABLET, FILM COATED ORAL at 12:07

## 2020-02-02 RX ADMIN — ATORVASTATIN CALCIUM 20 MG: 20 TABLET, FILM COATED ORAL at 20:27

## 2020-02-02 RX ADMIN — ACETAMINOPHEN 650 MG: 325 TABLET, FILM COATED ORAL at 02:06

## 2020-02-02 RX ADMIN — DEXAMETHASONE 1 MG: 1 TABLET ORAL at 09:14

## 2020-02-02 RX ADMIN — LISINOPRIL 20 MG: 20 TABLET ORAL at 09:14

## 2020-02-02 RX ADMIN — MELATONIN 1000 UNITS: at 09:14

## 2020-02-02 RX ADMIN — ACETAMINOPHEN 650 MG: 325 TABLET, FILM COATED ORAL at 16:18

## 2020-02-02 RX ADMIN — ACETAMINOPHEN 650 MG: 325 TABLET, FILM COATED ORAL at 20:27

## 2020-02-02 RX ADMIN — PANTOPRAZOLE SODIUM 40 MG: 40 TABLET, DELAYED RELEASE ORAL at 06:35

## 2020-02-02 RX ADMIN — CALCIUM CARBONATE (ANTACID) CHEW TAB 500 MG 1000 MG: 500 CHEW TAB at 16:25

## 2020-02-02 RX ADMIN — ACETAMINOPHEN 650 MG: 325 TABLET, FILM COATED ORAL at 06:35

## 2020-02-02 ASSESSMENT — MIFFLIN-ST. JEOR: SCORE: 1017.79

## 2020-02-02 NOTE — PLAN OF CARE
Pt attended seated yoga exercise class today with group of 4 patients.  Pt selected for class due to documented strength and mobility deficits.  Class includes education in movement and breath awareness, and time spent doing seated yoga exercises lead by staff. Pt was able to participate and demonstrated improved respiratory mechanics, decreased muscle guarding, improved neutral sitting posture.      Russ Oden, PT  2/2/2020

## 2020-02-02 NOTE — PLAN OF CARE
OT: Session focused on kitchen tasks to promote functional cognition, endurance, and ambulation with walker. Pt was able to stand and ambulate with walker for majority of session with only 1 seated break. She required min-mod vcs for initiation, visual scanning, mgmt of walker in kitchen area, and problem solving.

## 2020-02-02 NOTE — PLAN OF CARE
FOCUS/GOAL  Mobility, Reinforcement of self-care/ADL, and Safety management    ASSESSMENT, INTERVENTIONS AND CONTINUING PLAN FOR GOAL:  Pt is A&Ox4, VSS and reports H/A that is managed w/ tylenol PRN. Pt is MOD I in room during the day and is continent using toilet. Pt has an incision on occiput of scalp that is NIELS and free of bleeding/drainage  or Sx of infection. Pt continues to have slurred speech. Pt is on MAP program and called for medications appropriately, was able to select correct medications correctly. Pt had diet changed to D2 this shift.

## 2020-02-02 NOTE — PLAN OF CARE
Discharge Planner PT   Patient plan for discharge: Anticipated discharge 2/5 to home with OP PT.  Current status: Ind in room.  Pt working on overall strength and endurence for daily living.  Pt does note continued dizziness and balance deficits.  Barriers to return to prior living situation: none foreseen. Pt motivated, has good support  Recommendations for discharge: Continue to build overall HEP as OP PT is 35 miles away from home and pt would like strong HEP for discharge.  Build on strength and endurance and address dizziness/balance.  Rationale for recommendations: Pt presentation and home environment       Entered by: Eb Martini 02/02/2020 4:26 PM

## 2020-02-02 NOTE — PROGRESS NOTES
"  [facility]   Acute Rehabilitation Unit  Daily progress note    INTERVAL HISTORY  Sheila was seen and examined at bedside. She reports that she is feeling a lot better. She has no chest pain, shortness of breath, or other concerns. Overnight she was transferring impulsively, updated therapists during team rounds who are assessing activity safety/capabilities.    MEDICATIONS  Scheduled meds    - Medication Assessment Program - Rehab Services   Does not apply See Admin Instructions     atorvastatin  20 mg Oral or Feeding Tube QPM     dexamethasone  1 mg Oral Daily     insulin aspart  1-7 Units Subcutaneous TID AC     insulin aspart  1-5 Units Subcutaneous At Bedtime     lisinopril  20 mg Oral Daily     methylcellulose  500 mg Oral Daily     pantoprazole  40 mg Oral QAM AC     Vitamin D3  1,000 Units Oral Daily       PRN meds:  acetaminophen, calcium carbonate, cyclobenzaprine, glucose **OR** dextrose **OR** glucagon, hydrOXYzine, magic mouthwash suspension (diphenhydrAMINE, lidocaine, aluminum-magnesium & simethicone), meclizine      PHYSICAL EXAM  /62   Pulse 86   Temp 96.3  F (35.7  C) (Oral)   Resp 18   Ht 1.537 m (5' 0.5\")   Wt 57.3 kg (126 lb 6.4 oz)   LMP  (LMP Unknown)   SpO2 98%   BMI 24.28 kg/m    Gen: pleasant,NAD  HEENT: craniotomy incision site C/D/I, closed with sutures.  No erythema noted.  Cardio: RRR, S1+S2, no m/r/g  Pulm: non-labored, CTA b/l, no w/r/r  Ext: no edema B/I, no calf tenderness B/I  Neuro/MSK: Alert and following commands and answering questions appropriately    LABS  CBC RESULTS:   Recent Labs   Lab Test 01/30/20  0606   WBC 10.0   RBC 3.53*   HGB 11.8   HCT 33.7*   MCV 96   MCH 33.4*   MCHC 35.0   RDW 12.5        Last Comprehensive Metabolic Panel:  Sodium   Date Value Ref Range Status   01/30/2020 135 133 - 144 mmol/L Final     Potassium   Date Value Ref Range Status   01/30/2020 3.9 3.4 - 5.3 mmol/L Final     Chloride "   Date Value Ref Range Status   01/30/2020 102 94 - 109 mmol/L Final     Carbon Dioxide   Date Value Ref Range Status   01/30/2020 27 20 - 32 mmol/L Final     Anion Gap   Date Value Ref Range Status   01/30/2020 6 3 - 14 mmol/L Final     Glucose   Date Value Ref Range Status   01/30/2020 112 (H) 70 - 99 mg/dL Final     Urea Nitrogen   Date Value Ref Range Status   01/30/2020 16 7 - 30 mg/dL Final     Creatinine   Date Value Ref Range Status   01/30/2020 0.76 0.52 - 1.04 mg/dL Final     GFR Estimate   Date Value Ref Range Status   01/30/2020 79 >60 mL/min/[1.73_m2] Final     Comment:     Non  GFR Calc  Starting 12/18/2018, serum creatinine based estimated GFR (eGFR) will be   calculated using the Chronic Kidney Disease Epidemiology Collaboration   (CKD-EPI) equation.       Calcium   Date Value Ref Range Status   01/30/2020 8.4 (L) 8.5 - 10.1 mg/dL Final       Recent Labs   Lab 02/02/20  1211 02/02/20  0828 02/01/20  2138 02/01/20  1659 02/01/20  1212 02/01/20  0759  01/30/20  0606  01/28/20  0602  01/27/20  0724   GLC  --   --   --   --   --   --   --  112*  --  131*  --  142*   * 103* 124* 132* 126* 99   < >  --    < >  --    < >  --     < > = values in this interval not displayed.       ASSESSMENT AND PLAN    Sheila Barraza is a 69 year old right hand dominant female with brainstem cavernoma status post hemorrhage now resected via suboccipital craniotomy on 1/22/20. PMHx HTN, IBS, HLD, trigeminal neuralgia s/p two rhizotomies at [facility] on the left side, inguinal hernia repair, and JIMENEZ.  Hospital course complicated by  a fall.  Admitted to acute inpatient rehab 01/28/20     Impairment group code: 01.4 No Paresis Stroke: pontomedullary ICH + suboccipital craniotomy for resection of brainstem cavernous malformation        1. PT, OT and SLP 60 minutes of each on a daily basis, in addition to rehab nursing and close management of physiatrist.       2. Impairment of ADL's:  OT for 60 min  daily to work on upper and lower body self care, dressing, toileting, bathing, energy conservation techniques with use of ADs as needed.      3. Impairment of mobility:   PT for 60 min daily to work on gait exercises, strengthening, endurance buildup, transfers with use of walker as needed.      4. Impairment of cognition/language/swallow:   SLP for 60 min daily for cognitive evaluation and treatment strategies for higher level cognitive deficits and memory impairment.      5. Rehab RN to administer medication, patient education on medication taking, VS monitoring, bowel regimen, glucose monitoring and wound care/surgical wound dressing changes and monitoring. .     1. Medical Conditions  #Pontomeduallry ICH, ICH Score = 1, NIHSS = 0  #Brainstem cavernoma s/p resection vis suboccipital craniotomy 1/22/20  - Decadron, taper over one week, sliding scale insulin while on decadron.  Per discussion with discharging team, continue Decadron 1 mg for total of 7 days (ending 2/5)  - SBP Goal < 140    - wound care; ok to remove sutures in 2 weeks  - leukocytosis most likely due to steroids and demargination; Re-checked on 1/30 and now returned to normal values    PT/OT/Speech      #Trigeminal Neuralgia  Prior balloon rhizotomies x2 at the [facility], on the left side. Left sensory deficit      #HLD  -Atorvastatin 20 mg daily     # Hypertension  - SBP goals of < 140   - lisinopril 20 mg daily     #Wound Care: posterior head, S/P craniotomy,  Sutures are not absorbable and need to be removed in 2 weeks. If patient still at rehab by this time, the sutures may be removed by the rehab physician,if considers that the wound has healed completely. Sutures should be removed on post-operative day #14 (2/5/2020).  -monitor for infection      #JIMENEZ  Pt has been using CPAP for ~7 years   - Continue home home CPAP     # IBS   - PRN bowel regimen and scheduled Citrucel      # Neurogenic bowel and bladder: started after surgery. no sensation  when defecating or urinating. She has impaired sensation at genital area and buttocks.   - UA on 1/26 due to urgency with result neg      # H/o Fall on POD #1 the patient fell and hit her head while attempted to clean herself on the toilet. A Head CT and C-Spine CT were obtained and were negative for acute pathology    #Anxiety  -Add Atarax PRN        2. Adjustment to disability:  Clinical psychology to eval and treat  3. FEN: DD1 diet w/ thin liquids.  VFSS on Monday  4. Bowel: Continent BM 1/30  5. Bladder: PVRs x3 WNL, may check PRN only   6. DVT Prophylaxis: mechanical, declined heparin, wants to walk.  7. GI Prophylaxis: protonix  8. Code: Full  9. Disposition: home   10. ELOS:  2/5/20.  11. Rehab prognosis:  anticipate progression  12. Follow up Appointments on Discharge:               -Neurosurgery Clinic in 10-14 days for wound evaluation.              - Dr. Prabhakar in Neurosurgery Clinic in ~3 months.prior Once discharged from rehab but prior to returning to [place].  Please page Anna Holloway, ARASELI @ [phone] or [phone] once discharge is anticipated for coordination of appointment scheduling              - Primary Care Provider in 7 days      Pt care, assessment, and plan, discussed with attending physician Dr. Mary Fenton, DO  PM&R Resident, PGY-2  PGR: [phone]

## 2020-02-02 NOTE — PLAN OF CARE
FOCUS/GOAL  Medication management, Pain management, Mobility and Skin integrity    ASSESSMENT, INTERVENTIONS AND CONTINUING PLAN FOR GOAL:    Pt is A&Ox4. VSS. Managing head/incisional pain with prn tylenol every 4 hrs. received atarax x1 at bedtime per pt request. Denied sob, denied difficulty breathing, denied chest pain. No neuro changes. Good appetite and oral hydration. BGs were 132 and 124.  Posterior surgical incision CDI. MOD I with walker during the day. Ax1 with walker at night with alarm on. CPAP on for the night. Using call light to communicate needs. Alarm on for the night. Call light with in reach. Will continue to monitor.

## 2020-02-02 NOTE — PLAN OF CARE
FOCUS/GOAL  Bowel management, Bladder management, Pain management and Skin integrity    ASSESSMENT, INTERVENTIONS AND CONTINUING PLAN FOR GOAL:  Pt is alert and oriented, reported generalized pain but did not specify location and requested to be woke up to receive tylenol at 2am to prevent worsening of pain, pt was educated on the importance of wearing non slip foot wear. Pt transferred impulsively after taking tylenol at 2 am to toilet to void, pt declined dizziness, N/V, fever, chest pain, sob, or new numbness or tingling, incision to posterior head approximated with sutures in place and NIELS, speech slurred at baseline, pt seemed agitated at 2 am, no further care concerns at this time continue with POC.

## 2020-02-02 NOTE — PLAN OF CARE
SLP: pt seen for swallowing, 2 meals trialed NDD2 again, pt needing only imin cues slow pace, frequent sips, swallow 2x PRN, no outward aspiration, if VFSS indicates, likely will advance to NDD2 prior to d/c

## 2020-02-03 ENCOUNTER — APPOINTMENT (OUTPATIENT)
Dept: GENERAL RADIOLOGY | Facility: CLINIC | Age: 70
DRG: 057 | End: 2020-02-03
Attending: PHYSICAL MEDICINE & REHABILITATION
Payer: MEDICARE

## 2020-02-03 LAB — GLUCOSE BLDC GLUCOMTR-MCNC: 87 MG/DL (ref 70–99)

## 2020-02-03 PROCEDURE — 97535 SELF CARE MNGMENT TRAINING: CPT | Mod: GO | Performed by: STUDENT IN AN ORGANIZED HEALTH CARE EDUCATION/TRAINING PROGRAM

## 2020-02-03 PROCEDURE — 00000146 ZZHCL STATISTIC GLUCOSE BY METER IP

## 2020-02-03 PROCEDURE — 92526 ORAL FUNCTION THERAPY: CPT | Mod: GN | Performed by: REHABILITATION PRACTITIONER

## 2020-02-03 PROCEDURE — 25000132 ZZH RX MED GY IP 250 OP 250 PS 637: Mod: GY | Performed by: NURSE PRACTITIONER

## 2020-02-03 PROCEDURE — 97530 THERAPEUTIC ACTIVITIES: CPT | Mod: GP | Performed by: REHABILITATION PRACTITIONER

## 2020-02-03 PROCEDURE — 97110 THERAPEUTIC EXERCISES: CPT | Mod: GP | Performed by: REHABILITATION PRACTITIONER

## 2020-02-03 PROCEDURE — 25000132 ZZH RX MED GY IP 250 OP 250 PS 637: Mod: GY | Performed by: PHYSICAL MEDICINE & REHABILITATION

## 2020-02-03 PROCEDURE — 74230 X-RAY XM SWLNG FUNCJ C+: CPT

## 2020-02-03 PROCEDURE — 92611 MOTION FLUOROSCOPY/SWALLOW: CPT | Mod: GN | Performed by: SPEECH-LANGUAGE PATHOLOGIST

## 2020-02-03 PROCEDURE — 25000131 ZZH RX MED GY IP 250 OP 636 PS 637: Mod: GY | Performed by: NURSE PRACTITIONER

## 2020-02-03 PROCEDURE — 12800006 ZZH R&B REHAB

## 2020-02-03 RX ADMIN — LISINOPRIL 20 MG: 20 TABLET ORAL at 08:22

## 2020-02-03 RX ADMIN — ACETAMINOPHEN 650 MG: 325 TABLET, FILM COATED ORAL at 13:40

## 2020-02-03 RX ADMIN — DEXAMETHASONE 1 MG: 1 TABLET ORAL at 08:22

## 2020-02-03 RX ADMIN — ACETAMINOPHEN 650 MG: 325 TABLET, FILM COATED ORAL at 05:06

## 2020-02-03 RX ADMIN — ACETAMINOPHEN 650 MG: 325 TABLET, FILM COATED ORAL at 17:53

## 2020-02-03 RX ADMIN — PANTOPRAZOLE SODIUM 40 MG: 40 TABLET, DELAYED RELEASE ORAL at 05:07

## 2020-02-03 RX ADMIN — ACETAMINOPHEN 650 MG: 325 TABLET, FILM COATED ORAL at 09:21

## 2020-02-03 RX ADMIN — ATORVASTATIN CALCIUM 20 MG: 20 TABLET, FILM COATED ORAL at 20:52

## 2020-02-03 RX ADMIN — HYDROXYZINE HYDROCHLORIDE 25 MG: 25 TABLET, FILM COATED ORAL at 08:28

## 2020-02-03 RX ADMIN — HYDROXYZINE HYDROCHLORIDE 25 MG: 25 TABLET, FILM COATED ORAL at 20:52

## 2020-02-03 RX ADMIN — MELATONIN 1000 UNITS: at 08:23

## 2020-02-03 ASSESSMENT — MIFFLIN-ST. JEOR: SCORE: 1015.52

## 2020-02-03 NOTE — PROGRESS NOTES
02/03/20 1600   General Information   Onset Date 01/22/20   Start of Care Date 01/30/20   Referring Physician Dr. Potter   Patient Profile Review/OT: Additional Occupational Profile Info See Profile for full history and prior level of function   Patient/Family Goals Statement pt wants to advance to at least NDD2 diet   Swallowing Evaluation Videofluoroscopic evaluation   Behaviorial Observations WFL (within functional limits)   Mode of current nutrition Oral diet   Type of oral diet Dysphagia diet level 1;Thin liquid   Respiratory Status Room air   Comments Sheila Barraza is a 69 year old right hand dominant female with brainstem cavernoma status post hemorrhage now resected via suboccipital craniotomy on 1/22/20. PMHx HTN, IBS, HLD, trigeminal neuralgia s/p two rhizotomies at [facility] on the left side, inguinal hernia repair, and JIMENEZ.  Hospital course complicated by  a fall.  Admitted to acute inpatient rehab 01/28/20. Pt was seen for swallowing by speech while in hospital; however notes also indicate difficulty with dysarthria, mild cognitive deficits noted per evaluation.  pt appears to be tolerating current diet, but did show recent sx difficulty with thin/semi solids/pureed, so vFSS ordered. last few days pt doing better, clinically   VFSS Eval: Thin Liquid Texture Trial   Mode of Presentation, Thin Liquid cup;straw;self-fed   Order of Presentation 1-5, 8,10 cup; 6 straw   Oral Phase, Thin Liquid WFL   Pharyngeal Phase, Thin Liquid Residue in valleculae;Residue in pyriform sinus   Rosenbek's Penetration Aspiration Scale: Thin Liquid Trial Results 4 - contrast contacts vocal cords, no residue remains (penetration)  (after swallow x1 deep penetration, intermittent throat clear)   Successful Strategies Trialed During Procedure, Thin Liquid chin tuck   Diagnostic Statement SLP pt demonst, rates intermittent flash penetration with cup, deep penetration to folds after swallow with straw, occasional throat  clearing noted.  pharyngeal residue after swallow 2/2 reduced tongue base retraction/hyolaryngeal excursion, inconsistent improvement with slight chin tuck   VFSS Eval: Semisolid Texture Trial   Mode of Presentation, Semisolid spoon;fed by clinician   Order of Presentation 7   Preparatory Phase WFL;Other (see comments)  (slight increased time)   Pharyngeal Phase, Semisolid Residue in valleculae;Residue in pyriform sinus   Rosenbek's Penetration Aspiration Scale: Semisolid Food Trial Results 1 - no aspiration, contrast does not enter airway  (intermittent throat clear)   Diagnostic Statement SLP: mild pharyngeal residue noted after swallow, partially clears with subsequqent liquid swallow   VFSS Eval: Solid Food Texture Trial   Mode of Presentation, Solid spoon;fed by clinician   Order of Presentation 9   Preparatory Phase Other (see comments)  (slight increased time, pt reporting slight residue roof mout)   Oral Phase, Solid Residue in oral cavity   Pharyngeal Phase, Solid Residue in valleculae;Residue in pyriform sinus;other (see comments)  (upper esophageal residue noted.)   Rosenbek's Penetration Aspiration Scale: Solid Food Trial Results 1 - no aspiration, contrast does not enter airway   Successful Strategies Trialed During Procedure, Solid other (see comments)  (liquid swallow, additional swallows)   Diagnostic Statement SLP: noting mild increased mastication, time, pt stating slight residue roof mouth after.  Residue in valleculae, pyriform sinuses and upper esophagus, which cleared with subsequent liquid swallows   Esophageal Phase of Swallow   Esophageal sweep performed during today s vidofluoroscopic exam  Other (comments)  (noted upper esophageal stasis after solid swallow)   General Therapy Interventions   Planned Therapy Interventions Dysphagia Treatment   Dysphagia treatment Oropharyngeal exercise training;Modified diet education;Instruction of safe swallow strategies;Compensatory strategies for  swallowing   Swallow Eval: Clinical Impressions   Skilled Criteria for Therapy Intervention Skilled criteria met.  Treatment indicated.   Functional Assessment Scale (FAS) 4   Treatment Diagnosis mild oropharyngeal dysphagia    Diet texture recommendations Dysphagia diet level 2;Thin liquids   Recommended Feeding/Eating Techniques alternate between small bites and sips of food/liquid;hard swallow w/ each bite or sip;no straws;small sips/bites;other (see comments)  (if difficulty noted, can try chin tuck)   Therapy Frequency Daily   Predicted Duration of Therapy Intervention (days/wks) esimated 2 days til d/c   Anticipated Discharge Disposition home w/ outpatient services   Risks and Benefits of Treatment have been explained. Yes   Patient, family and/or staff in agreement with Plan of Care Yes   Clinical Impression Comments SLP; pt seen for videofluoroscopic swallowing study.  Pt given thin liquid, semi-solid and solid textures.  Noting intermittent flash penetration with thin by cup,and deeper to folds by straw. No aspiration noted. Chin tuck was inconsistently helpful to alleviate.  Slight increased chewing time for semi solids/solids, with minimal oral stasis.  Pharyngeal stasis (valleculae/pyriform, sinuses ) noted for all textures, more so with solid, including residue observed upper esophagus. Recommend advance to NDD2 diet (following meal assesment this date).continue with thin fluids.  Pt to take small sips,bites, slow pace, swallow 2x for liquids/semi solids.  No straw. Pt may also benefit from slight chin tuck with fluids, especially if increase in throat clearing/cough. Although no aspiration occurred for thin fluids, pt is at mild risk, and would benefit from ongoing monitoring of symptoms. If pt experiences esophageal motility sx after discharge home, may further need discuss with her MD for any diagnostic studies. Pt is below baseline and would benefit from skilled intervention to improve swallowing on  least restrictive diet.   Total Evaluation Time   Total Evaluation Time (Minutes) 30

## 2020-02-03 NOTE — PROGRESS NOTES
"  [facility]   Acute Rehabilitation Unit  Daily progress note    INTERVAL HISTORY  Weekend notes reviewed.  This morning Sheila has no new concerns or complaints.  She feels overall improved, but still has some issues with her vision and numbness of the tongue, although this has also slightly improved.  Denies chest pain, shortness of breath, fevers, or chills.  Will be going for a VFSS today.  She is on track for discharge home Wednesday, but would like to leave early as it is about a 12 hour drive back to her home.      MEDICATIONS  Scheduled meds    - Medication Assessment Program - Rehab Services   Does not apply See Admin Instructions     atorvastatin  20 mg Oral or Feeding Tube QPM     dexamethasone  1 mg Oral Daily     lisinopril  20 mg Oral Daily     methylcellulose  500 mg Oral Daily     pantoprazole  40 mg Oral QAM AC     Vitamin D3  1,000 Units Oral Daily       PRN meds:  acetaminophen, calcium carbonate, cyclobenzaprine, glucose **OR** dextrose **OR** glucagon, hydrOXYzine, magic mouthwash suspension (diphenhydrAMINE, lidocaine, aluminum-magnesium & simethicone), meclizine      PHYSICAL EXAM  /64 (BP Location: Right arm)   Pulse 69   Temp 97.2  F (36.2  C) (Oral)   Resp 20   Ht 1.537 m (5' 0.5\")   Wt 57.1 kg (125 lb 14.4 oz)   LMP  (LMP Unknown)   SpO2 96%   BMI 24.18 kg/m    Gen: pleasant,NAD  HEENT: craniotomy incision site C/D/I, closed with sutures, fluid at the proximal end significantly improved and nearly resolved.  No erythema, opening, or drainage.  Cardio: RRR, S1+S2, no m/r/g  Pulm: non-labored, CTA b/l, no w/r/r  Abd: soft, BS x4, NT, ND  Ext: no edema B/I, no calf tenderness B/I  Neuro/MSK: Alert and following commands and answering questions appropriately    LABS  CBC RESULTS:   Recent Labs   Lab Test 01/30/20  0606   WBC 10.0   RBC 3.53*   HGB 11.8   HCT 33.7*   MCV 96   MCH 33.4*   MCHC 35.0   RDW 12.5          Last Comprehensive " Metabolic Panel:  Sodium   Date Value Ref Range Status   01/30/2020 135 133 - 144 mmol/L Final     Potassium   Date Value Ref Range Status   01/30/2020 3.9 3.4 - 5.3 mmol/L Final     Chloride   Date Value Ref Range Status   01/30/2020 102 94 - 109 mmol/L Final     Carbon Dioxide   Date Value Ref Range Status   01/30/2020 27 20 - 32 mmol/L Final     Anion Gap   Date Value Ref Range Status   01/30/2020 6 3 - 14 mmol/L Final     Glucose   Date Value Ref Range Status   01/30/2020 112 (H) 70 - 99 mg/dL Final     Urea Nitrogen   Date Value Ref Range Status   01/30/2020 16 7 - 30 mg/dL Final     Creatinine   Date Value Ref Range Status   01/30/2020 0.76 0.52 - 1.04 mg/dL Final     GFR Estimate   Date Value Ref Range Status   01/30/2020 79 >60 mL/min/[1.73_m2] Final     Comment:     Non  GFR Calc  Starting 12/18/2018, serum creatinine based estimated GFR (eGFR) will be   calculated using the Chronic Kidney Disease Epidemiology Collaboration   (CKD-EPI) equation.       Calcium   Date Value Ref Range Status   01/30/2020 8.4 (L) 8.5 - 10.1 mg/dL Final       Recent Labs   Lab 02/03/20  0721 02/02/20  2125 02/02/20  1747 02/02/20  1211 02/02/20  0828 02/01/20  2138  01/30/20  0606  01/28/20  0602   GLC  --   --   --   --   --   --   --  112*  --  131*   BGM 87 114* 112* 106* 103* 124*   < >  --    < >  --     < > = values in this interval not displayed.       ASSESSMENT AND PLAN    Sheila Barraza is a 69 year old right hand dominant female with brainstem cavernoma status post hemorrhage now resected via suboccipital craniotomy on 1/22/20. PMHx HTN, IBS, HLD, trigeminal neuralgia s/p two rhizotomies at [facility] on the left side, inguinal hernia repair, and JIMENEZ.  Hospital course complicated by  a fall.  Admitted to acute inpatient rehab 01/28/20     Impairment group code: 01.4 No Paresis Stroke: pontomedullary ICH + suboccipital craniotomy for resection of brainstem cavernous malformation        1. PT, OT and  SLP 60 minutes of each on a daily basis, in addition to rehab nursing and close management of physiatrist.       2. Impairment of ADL's:  OT for 60 min daily to work on upper and lower body self care, dressing, toileting, bathing, energy conservation techniques with use of ADs as needed.      3. Impairment of mobility:   PT for 60 min daily to work on gait exercises, strengthening, endurance buildup, transfers with use of walker as needed.      4. Impairment of cognition/language/swallow:   SLP for 60 min daily for cognitive evaluation and treatment strategies for higher level cognitive deficits and memory impairment.      5. Rehab RN to administer medication, patient education on medication taking, VS monitoring, bowel regimen, glucose monitoring and wound care/surgical wound dressing changes and monitoring. .     1. Medical Conditions  #Pontomeduallry ICH, ICH Score = 1, NIHSS = 0  #Brainstem cavernoma s/p resection vis suboccipital craniotomy 1/22/20  - Decadron,  Per discussion with discharging team, continue Decadron 1 mg for total of 7 days (ending 2/5)  -Glucose levels have been well controlled without the need for insulin, will discontinue glucose checks  - SBP Goal < 140    - wound care; ok to remove sutures in 2 weeks (~2/5)  - leukocytosis most likely due to steroids and demargination; Re-checked on 1/30 and now returned to normal values    PT/OT/Speech      #Trigeminal Neuralgia  Prior balloon rhizotomies x2 at the [facility], on the left side. Left sensory deficit      #HLD  -Atorvastatin 20 mg daily     # Hypertension  - SBP goals of < 140   - lisinopril 20 mg daily     #Wound Care: posterior head, S/P craniotomy,  Sutures are not absorbable and need to be removed in 2 weeks. If patient still at rehab by this time, the sutures may be removed by the rehab physician,if considers that the wound has healed completely. Sutures should be removed on post-operative day #14 (2/5/2020).  -monitor for  infection      #JIMEENZ  Pt has been using CPAP for ~7 years   - Continue home home CPAP     # IBS   - PRN bowel regimen and scheduled Citrucel      # Neurogenic bowel and bladder: started after surgery. no sensation when defecating or urinating. She has impaired sensation at genital area and buttocks.   - UA on 1/26 due to urgency with result neg      # H/o Fall on POD #1 the patient fell and hit her head while attempted to clean herself on the toilet. A Head CT and C-Spine CT were obtained and were negative for acute pathology    #Anxiety  -Atarax PRN        2. Adjustment to disability:  Clinical psychology to eval and treat  3. FEN: DD1 diet w/ thin liquids.  VFSS today  4. Bowel: Continent  5. Bladder: PVRs x3 WNL, may check PRN only   6. DVT Prophylaxis: mechanical, declined heparin, wants to walk.  7. GI Prophylaxis: protonix  8. Code: Full  9. Disposition: home   10. ELOS:  Tentative discharge date 2/5/20.  11. Rehab prognosis:  anticipate progression  12. Follow up Appointments on Discharge:               -Neurosurgery Clinic in 10-14 days for wound evaluation.              - Dr. Prabhakar in Neurosurgery Clinic in ~3 months.prior Once discharged from rehab but prior to returning to [place].  Please page Anna Holloway CNP @ [phone] or [phone] once discharge is anticipated for coordination of appointment scheduling              - Primary Care Provider in 7 days    Braulio Potter MD  Department of Rehabilitation Medicine  Pager: [phone]    Time Spent on this Encounter   I, Braulio Potter, spent a total of 25 minutes face-to-face or managing the care of Sheila Christi. Over 50% of my time on the unit was spent counseling the patient and coordinating care. See note for details.

## 2020-02-03 NOTE — PLAN OF CARE
RN: Pt had video swallow, awaiting results, pt plans to discharge to home on Wednesday.     Awaiting diet advance order per speech recommendation, continue to monitor/ assess swallowing.

## 2020-02-03 NOTE — PROGRESS NOTES
02/03/20 1600   General Information   Onset Date 01/22/20   Start of Care Date 01/30/20   Referring Physician Dr. Potter   Patient Profile Review/OT: Additional Occupational Profile Info See Profile for full history and prior level of function   Patient/Family Goals Statement pt wants to advance to at least NDD2 diet   Swallowing Evaluation Videofluoroscopic evaluation   Behaviorial Observations WFL (within functional limits)   Mode of current nutrition Oral diet   Type of oral diet Dysphagia diet level 1;Thin liquid   Respiratory Status Room air   Comments Sheila Barraza is a 69 year old right hand dominant female with brainstem cavernoma status post hemorrhage now resected via suboccipital craniotomy on 1/22/20. PMHx HTN, IBS, HLD, trigeminal neuralgia s/p two rhizotomies at [facility] on the left side, inguinal hernia repair, and JIMENEZ.  Hospital course complicated by  a fall.  Admitted to acute inpatient rehab 01/28/20. Pt was seen for swallowing by speech while in hospital; however notes also indicate difficulty with dysarthria, mild cognitive deficits noted per evaluation.  pt appears to be tolerating current diet, but did show recent sx difficulty with thin/semi solids/pureed, so vFSS ordered. last few days pt doing better, clinically   VFSS Eval: Thin Liquid Texture Trial   Mode of Presentation, Thin Liquid cup;straw;self-fed   Order of Presentation 1-5, 8,10 cup; 6 straw   Oral Phase, Thin Liquid WFL   Pharyngeal Phase, Thin Liquid Residue in valleculae;Residue in pyriform sinus   Rosenbek's Penetration Aspiration Scale: Thin Liquid Trial Results 4 - contrast contacts vocal cords, no residue remains (penetration)   Successful Strategies Trialed During Procedure, Thin Liquid chin tuck   Diagnostic Statement SLP pt demonst, rates intermittent flash penetration with cup, deep penetration to folds after swallow with straw, occasional throat clearing noted.  pharyngeal residue after swallow 2/2 reduced tongue  base retraction/hyolaryngeal excursion, inconsistent improvement with slight chin tuck   VFSS Eval: Semisolid Texture Trial   Mode of Presentation, Semisolid spoon;fed by clinician   Order of Presentation 7   Preparatory Phase WFL;Other (see comments)  (slight increased time)   Pharyngeal Phase, Semisolid Residue in valleculae;Residue in pyriform sinus   Rosenbek's Penetration Aspiration Scale: Semisolid Food Trial Results 1 - no aspiration, contrast does not enter airway   Diagnostic Statement SLP: mild pharyngeal residue noted after swallow, partially clears with subsequqent liquid swallow   VFSS Eval: Solid Food Texture Trial   Mode of Presentation, Solid spoon;fed by clinician   Order of Presentation 9   Preparatory Phase Other (see comments)  (slight increased time, pt reporting slight residue roof mout)   Oral Phase, Solid Residue in oral cavity   Pharyngeal Phase, Solid Residue in valleculae;Residue in pyriform sinus;other (see comments)  (upper esophageal residue noted.)   Rosenbek's Penetration Aspiration Scale: Solid Food Trial Results 1 - no aspiration, contrast does not enter airway   Successful Strategies Trialed During Procedure, Solid other (see comments)  (liquid swallow, additional swallows)   Diagnostic Statement SLP: noting mild increased mastication, time, pt stating slight residue roof mouth after.  Residue in valleculae, pyriform sinuses and upper esophagus, which cleared with subsequent liquid swallows   Esophageal Phase of Swallow   Esophageal sweep performed during today s vidofluoroscopic exam  Other (comments)  (noted upper esophageal stasis after solid swallow)   General Therapy Interventions   Planned Therapy Interventions Dysphagia Treatment   Dysphagia treatment Oropharyngeal exercise training;Modified diet education;Instruction of safe swallow strategies;Compensatory strategies for swallowing   Swallow Eval: Clinical Impressions   Skilled Criteria for Therapy Intervention Skilled  criteria met.  Treatment indicated.   Functional Assessment Scale (FAS) 4   Treatment Diagnosis mild oropharyngeal dysphagia    Diet texture recommendations Dysphagia diet level 2;Thin liquids   Recommended Feeding/Eating Techniques alternate between small bites and sips of food/liquid;hard swallow w/ each bite or sip;no straws;small sips/bites;other (see comments)  (if difficulty noted, can try chin tuck)   Therapy Frequency Daily   Predicted Duration of Therapy Intervention (days/wks) esimated 2 days til d/c   Anticipated Discharge Disposition home w/ outpatient services   Risks and Benefits of Treatment have been explained. Yes   Patient, family and/or staff in agreement with Plan of Care Yes   Clinical Impression Comments SLP; pt seen for videofluoroscopic swallowing study.  Pt given thin liquid, semi-solid and solid textures.  Noting intermittent flash penetration with thin by cup,and deeper to folds by straw.  Chin tuck was inconsistently helpful to alleviate.  Slight increased chewing time for semi solids/solids, with minimal oral stasis.  Pharyngeal stasis (valleculae/pyriform, sinuses ) noted for all textures, more so with solid, including residue observed upper esophagus. Recommend advance to NDD2 diet (following meal assesment this date).continue with thin fluids.  Pt to take small sips,bites, slow pace, swallow 2x for liquids/semi solids.  No straw. Pt may also benefit from slight chin tuck with fluids. Although no aspiration occurred for thin fluids, pt is at mild risk, and would benefit from ongoing monitoring of symptoms.  Pt is below baseline and would benefit from skilled intervention to improve swallowing on least restrictive diet.   Total Evaluation Time   Total Evaluation Time (Minutes) 30

## 2020-02-03 NOTE — PLAN OF CARE
FOCUS/GOAL  Bowel management, Bladder management, Pain management and Cognition/Memory/Judgment/Problem solving    ASSESSMENT, INTERVENTIONS AND CONTINUING PLAN FOR GOAL:  Pt is alert and oriented, requesting tylenol every 4 hours when awake, denied CP, SOB, fever, n/v, or new numbness and tingling, continent, mod I during the day and stand by at night, CPAP in use overnight, slept well, slurred speech at baseline, incision to posterior head approximated and CDI with sutures in place.

## 2020-02-03 NOTE — PLAN OF CARE
SLP; pt seen for videofluoroscopic swallowing study.  Pt given thin liquid, semi-solid and solid textures.  Noting intermittent flash penetration with thin by cup,and deeper to folds by straw. No aspiration noted. Chin tuck was inconsistently helpful to alleviate.  Slight increased chewing time for semi solids/solids, with minimal oral stasis.  Pharyngeal stasis (valleculae/pyriform, sinuses ) noted for all textures, more so with solid, including residue observed upper esophagus. Recommend advance to NDD2 diet (following meal assesment this date).continue with thin fluids.  Pt to take small sips,bites, slow pace, swallow 2x for liquids/semi solids.  No straw. Pt may also benefit from slight chin tuck with fluids, especially if increase in throat clearing/cough. Although no aspiration occurred for thin fluids, pt is at mild risk, and would benefit from ongoing monitoring of symptoms. If pt experiences esophageal motility sx after discharge home, may further need discuss with her MD for any diagnostic studies. Pt is below baseline and would benefit from skilled intervention to improve swallowing on least restrictive diet.

## 2020-02-03 NOTE — OP NOTE
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      TITLE OF PROCEDURES:   1.  Midline suboccipital craniotomy.   2.  Resection of C1 posterior arch.   3.  Resection of cavernous malformation of the left inferior medulla, complex.   4.  Intraoperative use of microscope.     5.  Intraoperative use of frameless stereotactic neuronavigation.   6.  Intraoperative use of ultrasound.      ATTENDING SURGEON:  Catarina Prabhakar MD.      RESIDENT SURGEON:  Kemi Gage MD.      HISTORY OF PRESENT ILLNESS:  Mrs. Barraza is a 69-year-old woman with a known cavernous malformation of the medulla.  This had been observed at another institution.  She has recently had multiple hemorrhages and the lesion appears to have erupted either close to the ependymal surface of the fourth ventricle floor or is very near the ependymal surface.  She has been recovering reasonably well from these ruptures and we have determined that resection of this malformation is indicated to reduce the risk of further hemorrhage and further neurological morbidity.  She presents for the above stated procedures.      DESCRIPTION OF PROCEDURE:  Upon intubation and induction of general anesthesia, the Rose head varela was applied.  She was placed in the prone position with her chest supported on gel rolls.  Her neck was secured with her neck slightly flexed.  After securing her to the table in this position, scalp fiducials were registered and frameless stereotactic neuronavigation was established using the Stealth system.  Electrodes for SSEP, MEP, and cranial nerve X and XII EMG were established by the [employer] staff.  We planned on a midline suboccipital approach.  Her scalp was prepared and draped in the usual sterile fashion.  The planned suboccipital and upper  cervical midline incision was infiltrated with 0.5% Marcaine with epinephrine.  A timeout was performed.  Baseline neuromonitoring recordings were obtained.      The incision was carried down to the pericranium in the occipital region and the suboccipital fascia.  The suboccipital fascia and muscle were divided with monopolar cautery.  We harvested the occipital pericranium for a potential autologous duraplasty.  The posterior arch of C1 was exposed in a subperiosteal fashion.  The wound edges were retracted with cerebellars.  We identified the external occipital protuberance.  We made an inferior suboccipital craniotomy initiated with  for the bur hole and completed with the high-speed cutting drill.  The bone flap was elevated and set aside.  We widened the opening in the foramen magnum using the high-speed cutting drill.  The posterior arch of C1 was resected using the drill and rongeurs.  We used the neuronavigation to check our inferior to superior trajectory and to check that the extent of bone removal was adequate.      The dura was opened in a Y pattern and the dural edges were retracted.  The microscope was draped and brought into the field.      We opened the cisterna magna and there was immediate brain relaxation.  We took down the arachnoid attachments along the cerebellar tonsils and the tonsillomedullary segments of both PICAs.  We mobilized the tonsils laterally and followed the dorsal surface of the medulla to the floor of the fourth ventricle.  We inspected the surface of the inferior aspect of the fourth ventricular floor.  There was a subtle yellow discoloration left of midline approximately 15 mm superior to the obex.  There certainly was no exophytic component to the cavernous malformation.  We used several methods to plan our incision into the medulla.  At this point the neuronavigation was not as reliable due to brain shift.  However, it still gave us some general idea for the  trajectory towards the lesion.  We used intraoperative ultrasound and confirmed that the cavernous malformation was under the discolored ependyma.  Next, we measured the distance from the obex and correlated this with the patient's preoperative MRI.  We also stimulated for the hypoglossal nucleus.  Based on all of these findings, we planned a 5 mm longitudinal linear incision just left of midline about 15 mm superior to the obex.  The incision was initiated with the Stebbins blade and extended with microscissors.  We dissected through approximately 2 mm of brainstem parenchyma and then encountered the cavernous malformation.  We punctured several of the caverns and cauterized the outer surface, reducing the size of the lesion.  We then used microscissors and laryngeal forceps to resect the lesion in a piecemeal fashion.  Of note, the patient has a known large venous angioma in the left vannessa and we looked for pedicles of this that might also serve as the associated venous angioma with this cavernous malformation.  As we reduced the size of the lesion, we were able to mobilize the capsule.  There was intermittent activity in the hypoglossal nucleus.  We identified the ventral hemosiderin stained wall of the medulla.  After resecting the inferior, medial and lateral parts of the lesion, we focused superiorly.  As we dissected this component, the patient developed profound bradycardia that resolved immediately.  We sharply dissected this remaining portion.  We were not entirely sure if there was a small remnant of the capsule superiorly.  Given the bradycardic episodes, we decided to cauterize this wall instead of attempting any further exploration.  The wound was irrigated and bleeding points were secured.  We did not see any obvious residual lesion.  There was 1 vein in the superior, medial aspect of the cavity which may have been the associated venous angioma and this was preserved.  There were no changes in SSEP or  MEP monitoring throughout.  The dura was then closed with interrupted and running 4-0 Nurolon and we used the pericranial patch for duraplasty as well.  The dural closure was then reinforced with a layer of DuraSeal.  Bone flap was replaced and secured with Synthes mini plates and screws.  The cervicodorsal fascia and suboccipital fascia were closed with interrupted 0 Vicryls.  The scalp was then closed in 2 layers.  A sterile dressing was applied.  The patient was returned to the supine position and the head varela was removed.  She was extubated and transported to the recovery room without incident.  Blood loss was approximately 50 mL.  Sponge and needle counts were correct at the end of the case.  I was present for the key portions and immediately available for the entire operation.  Please note that resection of this lesion was complex due to its location in the inferior medulla.         AUTUMN BOYD MD             D: 2020   T: 2020   MT: MIHAELA      Name:     TANIKA RIVAS   MRN:      [id number]        Account:        [id number]   :      1950           Procedure Date: 2020      Document: [id number]

## 2020-02-03 NOTE — PLAN OF CARE
FOCUS/GOAL  Medication management, Pain management, Wound care management, Mobility and Skin integrity    ASSESSMENT, INTERVENTIONS AND CONTINUING PLAN FOR GOAL:    Pt A&Ox4. VSS. Managing head/headache pain with prn tylenol every 4 hrs and cold pack. Surgical incision to back of head CDI. No s/s of infection noted.   Denied sob, denied difficulty breathing, denied chest pain. Good appetite and oral hydration. BGs were 112 and 118. Pt on MAP and did call for medication this shift. Pt was also able to identify medication. MOD I during the day with walker. CPAP on for the night. Utilizing call light to make needs known, alarm on for the night. Call light within reach. Will continue to monitor.

## 2020-02-03 NOTE — PLAN OF CARE
"PT: pt IND for all bed mobility sit to standing with WW. Pt amb up to 200'x 2 up and down 4x4 steps with Bora rails. Pt demo up and down one 6\" curb Ht. Pt demo in and out of cars and demo reaching for objects high and low.  "

## 2020-02-03 NOTE — PLAN OF CARE
OT: Working on IADL, pt completed money transactions, bill pay and kitchen task with SBA for occasional cue. Set up family training with  and son tomorrow to educate them on recommendation for oversight.

## 2020-02-03 NOTE — PLAN OF CARE
"SLP: Patient seen during noon meal to assess tolerance to DD2 solids (just advanced per VFSS completed earlier today) and to provided education in recommended swallow strategies. Reviewed results of VFSS and swallow strategies; Small bites/sips, no straws, alternate consistencies, pacing. Pt verbalized understanding of all teaching. Pt tolerated ground meat/gravy and pasta/gravy with throat clear noted x2. Pt independently using liquid wash/double swallow when needed to clear sensation of pharyngeal residue. Note moderate amount of oral stasis R and L lateral sulci- pt using finger sweep to clear effectively. Note pt is taking small bites/sips and pacing appropriately.  Pt demonstrating good insight \"I know I need to be careful\".     Recommend: Continue DD2 solids/thin liquids. Small bites/sips, no straws, alternate consistencies, pacing, finger sweep as needed.  "

## 2020-02-04 ENCOUNTER — APPOINTMENT (OUTPATIENT)
Dept: PHYSICAL THERAPY | Facility: CLINIC | Age: 70
End: 2020-02-04
Payer: MEDICARE

## 2020-02-04 ENCOUNTER — APPOINTMENT (OUTPATIENT)
Dept: OCCUPATIONAL THERAPY | Facility: CLINIC | Age: 70
End: 2020-02-04
Payer: MEDICARE

## 2020-02-04 ENCOUNTER — APPOINTMENT (OUTPATIENT)
Dept: SPEECH THERAPY | Facility: CLINIC | Age: 70
End: 2020-02-04
Payer: MEDICARE

## 2020-02-04 PROCEDURE — 12800006 ZZH R&B REHAB

## 2020-02-04 PROCEDURE — 25000132 ZZH RX MED GY IP 250 OP 250 PS 637: Mod: GY | Performed by: NURSE PRACTITIONER

## 2020-02-04 PROCEDURE — 97110 THERAPEUTIC EXERCISES: CPT | Mod: GP

## 2020-02-04 PROCEDURE — 97112 NEUROMUSCULAR REEDUCATION: CPT | Mod: GP

## 2020-02-04 PROCEDURE — 97530 THERAPEUTIC ACTIVITIES: CPT | Mod: GP

## 2020-02-04 PROCEDURE — 25000132 ZZH RX MED GY IP 250 OP 250 PS 637: Mod: GY | Performed by: PHYSICAL MEDICINE & REHABILITATION

## 2020-02-04 PROCEDURE — 97535 SELF CARE MNGMENT TRAINING: CPT | Mod: GO | Performed by: STUDENT IN AN ORGANIZED HEALTH CARE EDUCATION/TRAINING PROGRAM

## 2020-02-04 PROCEDURE — 25000131 ZZH RX MED GY IP 250 OP 636 PS 637: Mod: GY | Performed by: NURSE PRACTITIONER

## 2020-02-04 PROCEDURE — 92526 ORAL FUNCTION THERAPY: CPT | Mod: GN | Performed by: SPEECH-LANGUAGE PATHOLOGIST

## 2020-02-04 RX ORDER — PANTOPRAZOLE SODIUM 40 MG/1
40 TABLET, DELAYED RELEASE ORAL
Qty: 30 TABLET | Refills: 0 | Status: ON HOLD | OUTPATIENT
Start: 2020-02-04 | End: 2020-03-11

## 2020-02-04 RX ORDER — HYDROXYZINE HYDROCHLORIDE 25 MG/1
25 TABLET, FILM COATED ORAL 3 TIMES DAILY PRN
Qty: 90 TABLET | Refills: 0 | Status: ON HOLD | OUTPATIENT
Start: 2020-02-04 | End: 2020-03-09

## 2020-02-04 RX ORDER — LISINOPRIL 20 MG/1
20 TABLET ORAL DAILY
Qty: 30 TABLET | Refills: 0 | Status: ON HOLD | OUTPATIENT
Start: 2020-02-04 | End: 2020-03-09

## 2020-02-04 RX ORDER — ATORVASTATIN CALCIUM 20 MG/1
20 TABLET, FILM COATED ORAL EVERY EVENING
Qty: 30 TABLET | Refills: 0 | Status: ON HOLD | OUTPATIENT
Start: 2020-02-04 | End: 2020-03-09

## 2020-02-04 RX ADMIN — ACETAMINOPHEN 650 MG: 325 TABLET, FILM COATED ORAL at 16:00

## 2020-02-04 RX ADMIN — HYDROXYZINE HYDROCHLORIDE 25 MG: 25 TABLET, FILM COATED ORAL at 08:57

## 2020-02-04 RX ADMIN — HYDROXYZINE HYDROCHLORIDE 25 MG: 25 TABLET, FILM COATED ORAL at 20:18

## 2020-02-04 RX ADMIN — DEXAMETHASONE 1 MG: 1 TABLET ORAL at 08:53

## 2020-02-04 RX ADMIN — PANTOPRAZOLE SODIUM 40 MG: 40 TABLET, DELAYED RELEASE ORAL at 06:27

## 2020-02-04 RX ADMIN — MELATONIN 1000 UNITS: at 08:53

## 2020-02-04 RX ADMIN — LISINOPRIL 20 MG: 20 TABLET ORAL at 08:53

## 2020-02-04 RX ADMIN — ATORVASTATIN CALCIUM 20 MG: 20 TABLET, FILM COATED ORAL at 20:18

## 2020-02-04 RX ADMIN — ACETAMINOPHEN 650 MG: 325 TABLET, FILM COATED ORAL at 09:54

## 2020-02-04 RX ADMIN — ACETAMINOPHEN 650 MG: 325 TABLET, FILM COATED ORAL at 20:18

## 2020-02-04 ASSESSMENT — MIFFLIN-ST. JEOR: SCORE: 1016.88

## 2020-02-04 NOTE — PROGRESS NOTES
"  [facility]   Acute Rehabilitation Unit  Daily progress note    INTERVAL HISTORY  Sheila Barraza was seen and examined at bedside.  Pt continues to have unable to have eyes focus and numbness in mouth/tongues.Discharging plan and  Medications reviewed with Pt. Pt stated she will  stay with her daughter for sometime before returning home in [place]. Pt denied SOB, difficulty breathing or pain.     Functionally,Pt is mod I with FWW in room.  SBA with kitchen  And bill paying tasks. On DD2 diet with thin, no straw and tolerated it this morning, diet was advanced yesterday.      MEDICATIONS  Scheduled meds    - Medication Assessment Program - Rehab Services   Does not apply See Admin Instructions     atorvastatin  20 mg Oral or Feeding Tube QPM     lisinopril  20 mg Oral Daily     methylcellulose  500 mg Oral Daily     pantoprazole  40 mg Oral QAM AC     Vitamin D3  1,000 Units Oral Daily       PRN meds:  acetaminophen, calcium carbonate, cyclobenzaprine, glucose **OR** dextrose **OR** glucagon, hydrOXYzine, magic mouthwash suspension (diphenhydrAMINE, lidocaine, aluminum-magnesium & simethicone), meclizine      PHYSICAL EXAM  /54   Pulse 76   Temp 95.5  F (35.3  C) (Oral)   Resp 20   Ht 1.537 m (5' 0.5\")   Wt 57.1 kg (125 lb 14.4 oz)   LMP  (LMP Unknown)   SpO2 97%   BMI 24.18 kg/m    Gen: NAD, cooperative and pleasant  HEENT: craniotomy incision  with sutures  site C/D/I,  fluid at the proximal end resolved.  No erythema, or drainage  Cardio: RRR, S1 & S2 present  Pulm: clear breath sounds in all lobes, Non-labored  Abd: soft, flat, BS x4 active, NT, [place]  Ext: no edema, no calves tenderness  Neuro/MSK: following commands, Alert,    LABS  No new lab but prior reviewed    ASSESSMENT AND PLAN  Sheila Barraza is a 69 year old right hand dominant female with brainstem cavernoma status post hemorrhage now resected via suboccipital craniotomy on 1/22/20. PMHx HTN, IBS, HLD, " trigeminal neuralgia s/p two rhizotomies at [facility] on the left side, inguinal hernia repair, and JIMENEZ.  Hospital course complicated by  a fall.  Admitted to acute inpatient rehab 01/28/20     Impairment group code: 01.4 No Paresis Stroke: pontomedullary ICH + suboccipital craniotomy for resection of brainstem cavernous malformation        1. PT, OT and SLP 60 minutes of each on a daily basis, in addition to rehab nursing and close management of physiatrist.       2. Impairment of ADL's:  OT for 60 min daily to work on upper and lower body self care, dressing, toileting, bathing, energy conservation techniques with use of ADs as needed.      3. Impairment of mobility:   PT for 60 min daily to work on gait exercises, strengthening, endurance buildup, transfers with use of walker as needed.      4. Impairment of cognition/language/swallow:   SLP for 60 min daily for cognitive evaluation and treatment strategies for higher level cognitive deficits and memory impairment.      5. Rehab RN to administer medication, patient education on medication taking, VS monitoring, bowel regimen, glucose monitoring and wound care/surgical wound dressing changes and monitoring. .     1. Medical Conditions  #Pontomeduallry ICH, ICH Score = 1, NIHSS = 0  #Brainstem cavernoma s/p resection vis suboccipital craniotomy 1/22/20  - Decadron,  Per discussion with discharging team, continue Decadron 1 mg for total of 7 days (ending 2/5)  -Glucose levels have been well controlled without the need for insulin, will discontinue glucose checks  - SBP Goal < 140    - wound care; ok to remove sutures in 2 weeks (~2/5)  - leukocytosis most likely due to steroids and demargination; Re-checked on 1/30 and now returned to normal values    PT/OT/Speech      #Trigeminal Neuralgia  Prior balloon rhizotomies x2 at the [facility], on the left side. Left sensory deficit      #HLD  -Atorvastatin 20 mg daily     # Hypertension  - SBP goals of < 140   -  lisinopril 20 mg daily     #Wound Care: posterior head, S/P craniotomy,  Sutures are not absorbable and need to be removed in 2 weeks. If patient still at rehab by this time, the sutures may be removed by the rehab physician,if considers that the wound has healed completely. Sutures should be removed on post-operative day #14 (2/5/2020).  -monitor for infection      #JIMENEZ  Pt has been using CPAP for ~7 years   - Continue home home CPAP     # IBS   - PRN bowel regimen and scheduled Citrucel      # Neurogenic bowel and bladder: started after surgery. no sensation when defecating or urinating. She has impaired sensation at genital area and buttocks.   - UA on 1/26 due to urgency with result neg      # H/o Fall on POD #1 the patient fell and hit her head while attempted to clean herself on the toilet. A Head CT and C-Spine CT were obtained and were negative for acute pathology     #Anxiety  -Atarax PRN        2. Adjustment to disability:  Clinical psychology to eval and treat  3. FEN: DD2 diet w/ thin liquids.    4. Bowel: Continent  5. Bladder: PVRs x3 WNL, may check PRN only   6. DVT Prophylaxis: mechanical, declined heparin, wants to walk.  7. GI Prophylaxis: protonix  8. Code: Full  9. Disposition: home   10. ELOS:  Tentative discharge date 2/5/20.  11. Rehab prognosis:  anticipate progression  12. Follow up Appointments on Discharge:               -Neurosurgery Clinic in 10-14 days for wound evaluation.              - Dr. Prabhakar in Neurosurgery Clinic in ~3 months.prior Once discharged from rehab but prior to returning to [place].  Please page Anna Holloway CNP @ [phone] or [phone] once discharge is anticipated for coordination of appointment scheduling              - Primary Care Provider in 7 days          Mark Ashby CNP  Physical Medicine & Rehabilitation

## 2020-02-04 NOTE — PLAN OF CARE
Pt seen for meal follow-up on current DD2 with thin liquids- pt was just advanced to DD2 following VFSS yesterday. Pt did have some flash penetration on VFSS but no aspiration- however 1 instance of deeper laryngeal flash penetration which could put pt at increased risk of aspiration potentially. Pt noted to intermittently throat clear throughout meal both with DD2 solids and thin liquids- pt cued to slow rate, take smaller bites and sips. Also practice the chin tuck strategy with fluids but this was variable in its success with controlling throat clearing. Recommend to cautiously continue with DD2 and thin liquids- pt should be upright for all po, take small sips, bites, pace self- eat slowly, double swallow as needed, NO STRAW, and chin tuck prn. Pt is d/cing to home tomorrow with OP sptx to continue to address swallowing as well as cognition for memory, reasoning and flexible attention and also dysarthria.   Speech Language Therapy Discharge Summary    Reason for therapy discharge:    Discharged to home with outpatient therapy.    Progress towards therapy goal(s). See goals on Care Plan in [facility] electronic health record for goal details.  Goals partially met.  Barriers to achieving goals:   discharge from facility.    Therapy recommendation(s):    Continued therapy is recommended.  Rationale/Recommendations:  See above summary.

## 2020-02-04 NOTE — PROGRESS NOTES
A &O x4, able to communicate needs. Denies SOB, chest pain, nausea, or dizziness. Complained of incisional pain and requested tylenol x1 w/ relief per pt. Pt able to call appropriately for her medications and state which meds and dosages she was due for. Mod I w/ walker in room, SBA overnight. DD2 w/ thin liquids, good appetite. Continent of bowel and bladder, LBM 2/3. Incision NIELS, no s/s of infection. Wears CPAP overnight. Plans to discharge tomorrow. Pleasant and cooperative. Will continue with plan of care.

## 2020-02-04 NOTE — PROGRESS NOTES
Plan to discharge home tomorrow morning. Family transport home. OP therapy. Met with pt at bedside, confirmed discharge plans. Pt inquired about medications. Informed pt that meds would be filled before discharge and RN will go over discharge paperwork with instructions. Pt expressed understanding. Pt denied and additional needs or concerns. Pt signed IMM. No further SW needs.     Corrie Jenkins, DUNG, [facility] Acute Rehab Unit   Phone: [phone]  I   Pager: [phone]

## 2020-02-04 NOTE — DISCHARGE SUMMARY
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 Wound Care  2. Impaired functional mobility  3. Impaired ADLs  4. Impaired cognition         BRIEF SUMMARY  Sheila Barraza is a 69 year old right hand dominant female with brainstem cavernoma status post hemorrhage now resected via suboccipital craniotomy on 1/22/20. PMHx HTN, IBS, HLD, trigeminal neuralgia s/p two rhizotomies at [facility] on the left side, inguinal hernia repair, and JIMENEZ.  Hospital course complicated by  a fall.  Admitted to acute inpatient rehab 01/28/20    REHABILITAITON COURSE  Per PT: pt IND for all bed mobility sit to standing with WW. Pt amb up to 200'x 2 up and down 4x4 steps with Bora rails. Pt demo up and down one 6\" curb Ht. Pt demo in and out of cars and demo reaching for objects high and low.    Per OT: Recommendations:  Pt is MOD I with self cares and supervision for IADL. Pt needs bill pay and kitchen task with SBA for occasional cue. The family completed family training for education on SBA and oversight with IADL.     Per SLP:  Pt did have some flash penetration on VFSS on 2/3/20 but no aspiration- 1 instance of deeper laryngeal flash penetration which could put pt at increased risk of aspiration potentially. Pt noted to intermittently throat clear throughout meal both with DD2 solids and thin liquids- pt cued to slow rate, take smaller bites and sips. Recommend to cautiously continue with DD2 and thin liquids- pt " should be upright for all po, take small sips, bites, pace self- eat slowly, double swallow as needed, NO STRAW, and chin tuck prn. SLP to continue to address swallowing as well as cognition for memory, reasoning and flexible attention and also dysarthria.    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     PT/OT/Speech      #Trigeminal Neuralgia  Prior balloon rhizotomies x2 at the [facility], on the left side. Left sensory deficit      #HLD  -Atorvastatin 20 mg daily     # Hypertension  - SBP goals of < 140   - lisinopril 20 mg daily     #Wound Care: posterior head, S/P craniotomy,  Sutures are not absorbable and need to be removed in 2 weeks. If patient still at rehab by this time, the sutures may be removed by the rehab physician,if considers that the wound has healed completely. Sutures should be removed on post-operative day #14 (2/5/2020).  -monitor for infection      #JIMENEZ  Pt has been using CPAP for ~7 years   - Continue home home CPAP     # IBS   - PRN bowel regimen and scheduled Citrucel      # Neurogenic bowel and bladder: started after surgery. no sensation when defecating or urinating. She has impaired sensation at genital area and buttocks.   - UA on 1/26 due to urgency with result neg      # H/o Fall on POD #1 the patient fell and hit her head while attempted to clean herself on the toilet. A Head CT  and C-Spine CT were obtained and were negative for acute pathology     #Anxiety  -Atarax PRN    DISCHARGE MEDICATIONS  Discharge Medication List as of 2/5/2020  8:47 AM      START taking these medications    Details   hydrOXYzine (ATARAX) 25 MG tablet Take 1 tablet (25 mg) by mouth 3 times daily as needed for anxiety, Disp-90 tablet, R-0, E-Prescribe         CONTINUE these medications which have CHANGED    Details   atorvastatin (LIPITOR) 20 MG tablet Take 1 tablet (20 mg) by mouth every evening, Disp-30 tablet, R-0, E-Prescribe      lisinopril (PRINIVIL/ZESTRIL) 20 MG tablet Take 1 tablet (20 mg) by mouth daily, Disp-30 tablet, R-0, E-Prescribe      pantoprazole (PROTONIX) 40 MG EC tablet Take 1 tablet (40 mg) by mouth every morning (before breakfast), Disp-30 tablet, R-0, E-Prescribe         CONTINUE these medications which have NOT CHANGED    Details   acetaminophen (TYLENOL) 325 MG tablet 1-2 tablets (325-650 mg) by Oral or Feeding Tube route every 4 hours as needed for mild pain, fever or headaches, TransitionalIndication: Mild Pain; Headache; Fever      calcium carbonate (TUMS) 500 MG chewable tablet Take 2 tablets (1,000 mg) by mouth 3 times daily as needed for heartburn, TransitionalIndication: Heartburn      cinnamon 500 MG TABS Take 1,000 mg by mouth daily, Historical      methylcellulose (CITRUCEL) 500 MG TABS tablet Take 1 tablet (500 mg) by mouth daily, HistoricalIndication: Prevention of Constipation      Vitamin D, Cholecalciferol, 25 MCG (1000 UT) TABS Take 1,000 Units by mouth daily, Historical         STOP taking these medications       clonazePAM (KLONOPIN) 0.5 MG tablet Comments:   Reason for Stopping:         dexamethasone (DECADRON) 1 MG tablet Comments:   Reason for Stopping:         dexamethasone (DECADRON) 2 MG tablet Comments:   Reason for Stopping:         magic mouthwash suspension, diphenhydrAMINE, lidocaine, aluminum-magnesium & simethicone, (FIRST-MOUTHWASH BLM) compounding kit  Comments:   Reason for Stopping:         meclizine (ANTIVERT) 12.5 MG tablet Comments:   Reason for Stopping:                 DISCHARGE INSTRUCTIONS AND FOLLOW UP  Discharge Procedure Orders   MR Brain w/o & w Contrast   Standing Status: Future Standing Exp. Date: 02/05/21     Order Specific Question Answer Comments   Clinical Info for the Interpreting Provider Follow up for posterior craniotomy for cavernoma resection    Priority Routine    Does the patient have claustrophobia? No      Physical Therapy Referral   Standing Status: Future   Referral Priority: Routine Referral Type: Rehab Therapy Physical Therapy   Number of Visits Requested: 1     Occupational Therapy Referral   Standing Status: Future   Referral Priority: Routine Referral Type: Occupational Therapy   Number of Visits Requested: 1     Speech Therapy Referral   Standing Status: Future   Referral Priority: Routine Referral Type: Therapeutic Services   Number of Visits Requested: 1     Reason for your hospital stay   Order Comments: You had a craniotomy     Activity   Order Comments: Your activity upon discharge: activity as tolerated and no driving till clear by a healthcare provider     Order Specific Question Answer Comments   Is discharge order? Yes      Wound care and dressings   Order Comments: Instructions to care for your wound at home: keep wound clean and dry   Ok to shower,however no scrubbing of the wound and no soaking of the wound, meaning no bathtubs or swimming pools. Pat dry only. Leave wound open to air.     Follow Up and recommended labs and tests   Order Comments: Follow up with primary care provider, Fior Jensen, within 7 days to evaluate medication change, to evaluate treatment change and for hospital follow- up.    -Neurosurgery Clinic in 10-14 days for wound evaluation.    - Dr. Prabhakar in Neurosurgery Clinic in ~3 months.prior Once discharged from rehab but prior to returning to [place].  Please page Anna Holloway CNP @  [phone] or [phone] once discharge is anticipated for coordination of appointment scheduling          Full Code     Order Specific Question Answer Comments   Code status determined by: Discussion with patient/legal decision maker      Diet   Order Comments: Follow this diet upon discharge:  Dysphagia Diet Level 2: Mechan Altered; Thin Liquids (water, ice chips, juice, milk gelatin, ice cream, etc) no straw     Order Specific Question Answer Comments   Is discharge order? Yes           PHYSICAL EXAMINATION    Most recent Vital Signs:   Vitals:    02/04/20 0853 02/04/20 1529 02/04/20 1913 02/05/20 0749   BP: 115/54 120/65  117/60   BP Location:  Right arm  Right arm   Pulse:  80  101   Resp:  16  20   Temp:  96.3  F (35.7  C)  96.3  F (35.7  C)   TempSrc:  Oral  Oral   SpO2:  97%  98%   Weight:   57.2 kg (126 lb 3.2 oz)    Height:           Gen: NAD, cooperative and pleasant  HEENT: craniotomy incision  without sutures, site C/D/I, edges well approximated   No erythema, or drainage,   Cardio: RRR, S1 & S2 present  Pulm: clear breath sounds in all lobes, Non-labored  Abd: soft, flat, NT, ND  Ext: no edema, no calves tenderness, freely moving extermities  Neuro/MSK: following commands and answering questions, Alert      Discharge summary was forwarded to Fior Jensen (PCP) at the time of discharge, so as to bridge from hospital to outpatient care.     It was our pleasure to care for Sheila Barraza during this hospitalization. Please do not hesitate to contact me should there be questions regarding the hospital course or discharge plan.        Mark Ashby, CNP  Physical Medicine & Rehabilitation

## 2020-02-04 NOTE — PLAN OF CARE
FOCUS/GOAL  Bowel management, Bladder management, Pain management, and Medical management    ASSESSMENT, INTERVENTIONS AND CONTINUING PLAN FOR GOAL:  Pt slept well with CPAP on overnight. Denied pain. Cont of B/B, LBM 2/3. MOD I in the room and Supervision at night. Patient called before getting put of bed and went to bathroom for voiding under supervision. Pt's gait was stable, and denied dizzy.   Call light and walker within reach, no bed alarm on this shift.    Pt on MAP program and demonstrated well.

## 2020-02-04 NOTE — PLAN OF CARE
FOCUS/GOAL  Bowel management, Medical management, and Mobility    ASSESSMENT, INTERVENTIONS AND CONTINUING PLAN FOR GOAL:    Pt is alert and oriented. Denies pain, SOB, fever, n/v, or new numbness and tingling. Mod I in room, but has supervision during evening shift. Able to transfer herself to bathroom. Pt usually continent but had incontinent episode of diarrhea during shift (pt has hx of IBS). Per NA report, pt was able to clean up most of BM herself, just needed a little help. Also had bloody nose episode per NA report. Pt was able to manage it it on her own- nose stopped bleeding. Pt participated in MAP and called for meds appropriately. Incision posterior head approximated with sutures- slightly pink and open to air but no concerns. Offered cleansing, pt declined. Bruises noted on bilateral feet (pt states not new). CPAP on during sleep. Pt pleasant and cooperative through out shift. Continue with POC.

## 2020-02-04 NOTE — PLAN OF CARE
Occupational Therapy Discharge Summary    Reason for therapy discharge:    Discharged to home with outpatient therapy.    Progress towards therapy goal(s). See goals on Care Plan in [facility] electronic health record for goal details.  Goals partially met.  Barriers to achieving goals:   discharge from facility.    Therapy recommendation(s):    Continued therapy is recommended.  Rationale/Recommendations:  Pt is MOD I with self cares and supervision for IADL. Pt and family completed family training for education on SBA and oversight with IADL. Pt will continue therapy with OP in [place].

## 2020-02-05 VITALS
WEIGHT: 126.2 LBS | SYSTOLIC BLOOD PRESSURE: 117 MMHG | DIASTOLIC BLOOD PRESSURE: 60 MMHG | HEIGHT: 61 IN | HEART RATE: 101 BPM | OXYGEN SATURATION: 98 % | BODY MASS INDEX: 23.83 KG/M2 | RESPIRATION RATE: 20 BRPM | TEMPERATURE: 96.3 F

## 2020-02-05 PROCEDURE — 25000132 ZZH RX MED GY IP 250 OP 250 PS 637: Mod: GY | Performed by: PHYSICAL MEDICINE & REHABILITATION

## 2020-02-05 PROCEDURE — 25000132 ZZH RX MED GY IP 250 OP 250 PS 637: Mod: GY | Performed by: NURSE PRACTITIONER

## 2020-02-05 RX ADMIN — PANTOPRAZOLE SODIUM 40 MG: 40 TABLET, DELAYED RELEASE ORAL at 06:07

## 2020-02-05 RX ADMIN — MELATONIN 1000 UNITS: at 07:57

## 2020-02-05 RX ADMIN — ACETAMINOPHEN 650 MG: 325 TABLET, FILM COATED ORAL at 08:02

## 2020-02-05 RX ADMIN — HYDROXYZINE HYDROCHLORIDE 25 MG: 25 TABLET, FILM COATED ORAL at 08:02

## 2020-02-05 RX ADMIN — LISINOPRIL 20 MG: 20 TABLET ORAL at 07:57

## 2020-02-05 NOTE — PLAN OF CARE
Problem: Goal/Outcome  Goal: Goal Outcome Summary  Outcome: No Change  Note:   FOCUS/GOAL  Medical management, Discharge planning, and Mobility    ASSESSMENT, INTERVENTIONS AND CONTINUING PLAN FOR GOAL:  Pt is alert and oriented x 4. Mod I in room with walker. Managed toileting, grooming and dressing independently. Sutures removed from posterior neck/occipital area per MD order. Prn tylenol given for pain with good effect. Discharge instructions reviewed with pt. Discharge meds given to pt. Discharged from unit at 0930 per wheelchair with her  and son, assisted by SHILOH.

## 2020-02-05 NOTE — PLAN OF CARE
FOCUS/GOAL  Bowel management, Bladder management, and Pain management    ASSESSMENT, INTERVENTIONS AND CONTINUING PLAN FOR GOAL:    Pt slept well on the overnight, no c/o pain or SOB. Alert and oriented x4, Mod I in the room with walker. Continent of bowel and bladder. Continue with plan of care.

## 2020-02-05 NOTE — DISCHARGE INSTRUCTIONS
Follow Up Appointments    -- Follow up with primary care provider within 7 days (evaluate medication change, treatment change and for hospital follow-up)  You are scheduled to see Dr. Fior Jensen on Monday, February 10th at 1:20 PM.    Address             [address] [address]  Phone                 [phone]    -- Follow up with MRI.  You are scheduled to be seen on Tuesday, April 14th at 8:30 AM.    Address            [facility]                            [address]                            [address]                            [place] [address]  Phone               [phone]    -- Follow up with neurosurgery clinic.  You are scheduled to see Dr. Prabhakar on Tuesday, April 14th at 9:30 AM.     Address             [facility]                            [address]                            [address]                            [place] [address]  Phone                [phone]

## 2020-02-05 NOTE — PLAN OF CARE
Physical Therapy Discharge Summary    Reason for therapy discharge:    Discharged to home with outpatient therapy.    Progress towards therapy goal(s). See goals on Care Plan in [facility] electronic health record for goal details.  Goals partially met.  Barriers to achieving goals:   discharge from facility.    Therapy recommendation(s):    Continued therapy is recommended.  Rationale/Recommendations:  progress to PLOF.  Address impaired balance, cervical ROM and environ scanning.     Mobility status at discharge:   MOD I with FWW in closed environ (included pt room and mobility on unit). [facility] 2/2 cognition, impaired vision and path finding.    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

## 2020-02-05 NOTE — PROGRESS NOTES
Patient is A&O x4. Denied CP and SOB. Drinking well and voiding spontaneously without difficulties. PRN tylenol given x2 for headache. Pt is Mod I in the room with walker during day and SBA at night. Continent of bowel and bladder, voids okay no BM during the shift. LBM 2/3 per pt report. Pt is on MAP, called on time and able to pick pills correctly. Order placed to remove suture tomorrow before pt discharge. Self epositioned and turned in bed, able to use call light appropriately and make needs known, will continue to monitor patient as POC.

## 2020-03-03 ENCOUNTER — TELEPHONE (OUTPATIENT)
Dept: NEUROSURGERY | Facility: CLINIC | Age: 70
End: 2020-03-03

## 2020-03-03 DIAGNOSIS — G50.0 TRIGEMINAL NEURALGIA: Primary | ICD-10-CM

## 2020-03-03 NOTE — TELEPHONE ENCOUNTER
[facility] Call Center    Phone Message    May a detailed message be left on voicemail: yes     Reason for Call: Symptoms or Concerns     If patient has red-flag symptoms, warm transfer to triage line    Current symptom or concern: The pt's daughter reports that the trigeminal neuralgia came back yesterday, the pt had 4 attacks yesterday. They took her to the ED and she is on Dilantin to control the pain. Rosaura is wondering if they can get the pt is sooner to be seen regarding this and she feels the pt will need another surgery. Please call Rosaura at [phone]. Thanks.     Symptoms have been present for:  3 day(s)    Has patient previously been seen for this? Yes    By : Vanna    Date: 1.22.2020    Are there any new or worsening symptoms? Yes: see above      Action Taken: Message routed to:  [facility] ([facility]  neurosurgery    Travel Screening: Not Applicable

## 2020-03-04 ENCOUNTER — HOSPITAL ENCOUNTER (OUTPATIENT)
Facility: CLINIC | Age: 70
End: 2020-03-04
Payer: MEDICARE

## 2020-03-04 RX ORDER — GABAPENTIN 100 MG/1
100 CAPSULE ORAL 2 TIMES DAILY
Qty: 60 CAPSULE | Refills: 1 | Status: ON HOLD | OUTPATIENT
Start: 2020-03-04 | End: 2020-03-11

## 2020-03-04 NOTE — TELEPHONE ENCOUNTER
Spoke with patient, Radiofrequency Rhizotomy left sided set for 3/17/20 @ 1130.  Patient will drive from [place] on 3/16, last PAC appointment set for 3/16 @ 9957.    Patient aware and will be at pac appointment.  Appointment with Dr Pierre either prior to procedure on 3/17.

## 2020-03-04 NOTE — TELEPHONE ENCOUNTER
"Spoke with patient, states Left sided V 2 area with electrical type pains, that comes and goes, having 3 strikes last night and another strike today.  On Dilantin 100mg bid at this time.  Last Balloon rhizotomy from the [facility] on 4/22/19  And pain did go away until recently.    Pain returned after surgery with Dr Prabhakar on 1/22/20    Will need to research with Dr Pierre, patient asking for another procedure asap for pain.  Patient states has tried \"all the other medications and they don't work\".  Allergy to Tegretol - hives.      Will get back to patient after discussion with Dr Pierre.            "

## 2020-03-04 NOTE — TELEPHONE ENCOUNTER
Dr aMrtin has patient on Dilantin 100mg BID.7am and 7pm  Dr Pierre can start Gabapentin 100mg  11am and and 100mg at Bedtime along with the Dilantin to help with Left sided facial pains.  Monitor for extreme dizziness or gait imbalance.  Patient may have tried in past but willing to see if that will help facial pains.    Explained working with scheduling to get an OV and set up a Rhizotomy procedure for the next day ok'd by Dr Pierre.  Will get back to patient.      Voices understanding, states wants procedure asap, also recommended calling Dr Martin office to keep informed of issue. Gabapentin 100mg BID called into pharmacy per patient request.

## 2020-03-05 NOTE — TELEPHONE ENCOUNTER
FUTURE VISIT INFORMATION      SURGERY INFORMATION:    Date: 3/17/20    Location: [facility] OR    Surgeon:  Jarek Pierre MD    Anesthesia Type:  Combined MAC with Local    Procedure: ANESTHESIA OUT OF OR Left Rhizotomy @1130    RECORDS REQUESTED FROM:       Primary Care Provider: Wanda Grullon MD - [facility] [place]    Most recent EKG+ Tracin20

## 2020-03-09 ENCOUNTER — APPOINTMENT (OUTPATIENT)
Dept: INTERVENTIONAL RADIOLOGY/VASCULAR | Facility: CLINIC | Age: 70
DRG: 042 | End: 2020-03-09
Attending: NEUROLOGICAL SURGERY
Payer: MEDICARE

## 2020-03-09 ENCOUNTER — HOSPITAL ENCOUNTER (INPATIENT)
Facility: CLINIC | Age: 70
LOS: 2 days | Discharge: HOME | DRG: 042 | End: 2020-03-11
Attending: EMERGENCY MEDICINE | Admitting: NEUROLOGICAL SURGERY
Payer: MEDICARE

## 2020-03-09 ENCOUNTER — ANESTHESIA (OUTPATIENT)
Dept: SURGERY | Facility: CLINIC | Age: 70
DRG: 042 | End: 2020-03-09
Payer: MEDICARE

## 2020-03-09 ENCOUNTER — ANESTHESIA EVENT (OUTPATIENT)
Dept: SURGERY | Facility: CLINIC | Age: 70
DRG: 042 | End: 2020-03-09
Payer: MEDICARE

## 2020-03-09 DIAGNOSIS — G50.0 TRIGEMINAL NEURALGIA: Primary | ICD-10-CM

## 2020-03-09 LAB
ABO + RH BLD: NORMAL
ABO + RH BLD: NORMAL
ALBUMIN SERPL-MCNC: 3.7 G/DL (ref 3.4–5)
ALP SERPL-CCNC: 88 U/L (ref 40–150)
ALT SERPL W P-5'-P-CCNC: 16 U/L (ref 0–50)
ANION GAP SERPL CALCULATED.3IONS-SCNC: 8 MMOL/L (ref 3–14)
APTT PPP: 28 SEC (ref 22–37)
AST SERPL W P-5'-P-CCNC: 7 U/L (ref 0–45)
BASOPHILS # BLD AUTO: 0.1 10E9/L (ref 0–0.2)
BASOPHILS NFR BLD AUTO: 0.7 %
BILIRUB SERPL-MCNC: 0.4 MG/DL (ref 0.2–1.3)
BLD GP AB SCN SERPL QL: NORMAL
BLOOD BANK CMNT PATIENT-IMP: NORMAL
BUN SERPL-MCNC: 16 MG/DL (ref 7–30)
CALCIUM SERPL-MCNC: 9.4 MG/DL (ref 8.5–10.1)
CHLORIDE SERPL-SCNC: 105 MMOL/L (ref 94–109)
CO2 SERPL-SCNC: 27 MMOL/L (ref 20–32)
CREAT SERPL-MCNC: 0.84 MG/DL (ref 0.52–1.04)
DIFFERENTIAL METHOD BLD: ABNORMAL
EOSINOPHIL # BLD AUTO: 0.1 10E9/L (ref 0–0.7)
EOSINOPHIL NFR BLD AUTO: 0.7 %
ERYTHROCYTE [DISTWIDTH] IN BLOOD BY AUTOMATED COUNT: 13.3 % (ref 10–15)
GFR SERPL CREATININE-BSD FRML MDRD: 70 ML/MIN/{1.73_M2}
GLUCOSE BLDC GLUCOMTR-MCNC: 89 MG/DL (ref 70–99)
GLUCOSE SERPL-MCNC: 129 MG/DL (ref 70–99)
HCT VFR BLD AUTO: 40.1 % (ref 35–47)
HGB BLD-MCNC: 13.4 G/DL (ref 11.7–15.7)
IMM GRANULOCYTES # BLD: 0.1 10E9/L (ref 0–0.4)
IMM GRANULOCYTES NFR BLD: 0.7 %
INR PPP: 1 (ref 0.86–1.14)
LYMPHOCYTES # BLD AUTO: 2.1 10E9/L (ref 0.8–5.3)
LYMPHOCYTES NFR BLD AUTO: 22.6 %
MCH RBC QN AUTO: 33.7 PG (ref 26.5–33)
MCHC RBC AUTO-ENTMCNC: 33.4 G/DL (ref 31.5–36.5)
MCV RBC AUTO: 101 FL (ref 78–100)
MONOCYTES # BLD AUTO: 0.6 10E9/L (ref 0–1.3)
MONOCYTES NFR BLD AUTO: 6.5 %
NEUTROPHILS # BLD AUTO: 6.4 10E9/L (ref 1.6–8.3)
NEUTROPHILS NFR BLD AUTO: 68.8 %
NRBC # BLD AUTO: 0 10*3/UL
NRBC BLD AUTO-RTO: 0 /100
PHENYTOIN SERPL-MCNC: 23.4 MG/L (ref 10–20)
PLATELET # BLD AUTO: 302 10E9/L (ref 150–450)
POTASSIUM SERPL-SCNC: 4.4 MMOL/L (ref 3.4–5.3)
PROT SERPL-MCNC: 7.1 G/DL (ref 6.8–8.8)
RBC # BLD AUTO: 3.98 10E12/L (ref 3.8–5.2)
SODIUM SERPL-SCNC: 140 MMOL/L (ref 133–144)
SPECIMEN EXP DATE BLD: NORMAL
WBC # BLD AUTO: 9.2 10E9/L (ref 4–11)

## 2020-03-09 PROCEDURE — 36415 COLL VENOUS BLD VENIPUNCTURE: CPT | Performed by: EMERGENCY MEDICINE

## 2020-03-09 PROCEDURE — 85730 THROMBOPLASTIN TIME PARTIAL: CPT | Performed by: EMERGENCY MEDICINE

## 2020-03-09 PROCEDURE — 12000001 ZZH R&B MED SURG/OB UMMC

## 2020-03-09 PROCEDURE — 25000128 H RX IP 250 OP 636: Performed by: STUDENT IN AN ORGANIZED HEALTH CARE EDUCATION/TRAINING PROGRAM

## 2020-03-09 PROCEDURE — 85025 COMPLETE CBC W/AUTO DIFF WBC: CPT | Performed by: EMERGENCY MEDICINE

## 2020-03-09 PROCEDURE — 99285 EMERGENCY DEPT VISIT HI MDM: CPT | Mod: Z6 | Performed by: EMERGENCY MEDICINE

## 2020-03-09 PROCEDURE — 85610 PROTHROMBIN TIME: CPT | Performed by: EMERGENCY MEDICINE

## 2020-03-09 PROCEDURE — 25800030 ZZH RX IP 258 OP 636: Performed by: EMERGENCY MEDICINE

## 2020-03-09 PROCEDURE — 80053 COMPREHEN METABOLIC PANEL: CPT | Performed by: EMERGENCY MEDICINE

## 2020-03-09 PROCEDURE — 25000125 ZZHC RX 250: Performed by: NURSE ANESTHETIST, CERTIFIED REGISTERED

## 2020-03-09 PROCEDURE — 96361 HYDRATE IV INFUSION ADD-ON: CPT | Performed by: EMERGENCY MEDICINE

## 2020-03-09 PROCEDURE — 37000009 ZZH ANESTHESIA TECHNICAL FEE, EACH ADDTL 15 MIN

## 2020-03-09 PROCEDURE — 25800030 ZZH RX IP 258 OP 636: Performed by: STUDENT IN AN ORGANIZED HEALTH CARE EDUCATION/TRAINING PROGRAM

## 2020-03-09 PROCEDURE — 86901 BLOOD TYPING SEROLOGIC RH(D): CPT | Performed by: EMERGENCY MEDICINE

## 2020-03-09 PROCEDURE — 005K3ZZ DESTRUCTION OF TRIGEMINAL NERVE, PERCUTANEOUS APPROACH: ICD-10-PCS | Performed by: NEUROLOGICAL SURGERY

## 2020-03-09 PROCEDURE — 86900 BLOOD TYPING SEROLOGIC ABO: CPT | Performed by: EMERGENCY MEDICINE

## 2020-03-09 PROCEDURE — 25000132 ZZH RX MED GY IP 250 OP 250 PS 637: Mod: GY | Performed by: STUDENT IN AN ORGANIZED HEALTH CARE EDUCATION/TRAINING PROGRAM

## 2020-03-09 PROCEDURE — 40000170 ZZH STATISTIC PRE-PROCEDURE ASSESSMENT II

## 2020-03-09 PROCEDURE — 00000146 ZZHCL STATISTIC GLUCOSE BY METER IP

## 2020-03-09 PROCEDURE — 80185 ASSAY OF PHENYTOIN TOTAL: CPT | Performed by: EMERGENCY MEDICINE

## 2020-03-09 PROCEDURE — 86850 RBC ANTIBODY SCREEN: CPT | Performed by: EMERGENCY MEDICINE

## 2020-03-09 PROCEDURE — 25000128 H RX IP 250 OP 636: Performed by: NURSE ANESTHETIST, CERTIFIED REGISTERED

## 2020-03-09 PROCEDURE — 64999 UNLISTED PX NERVOUS SYSTEM: CPT

## 2020-03-09 PROCEDURE — 37000008 ZZH ANESTHESIA TECHNICAL FEE, 1ST 30 MIN

## 2020-03-09 PROCEDURE — 99285 EMERGENCY DEPT VISIT HI MDM: CPT | Mod: 25 | Performed by: EMERGENCY MEDICINE

## 2020-03-09 PROCEDURE — 96360 HYDRATION IV INFUSION INIT: CPT | Performed by: EMERGENCY MEDICINE

## 2020-03-09 PROCEDURE — 40000141 ZZH STATISTIC PERIPHERAL IV START W/O US GUIDANCE

## 2020-03-09 PROCEDURE — 25800030 ZZH RX IP 258 OP 636: Performed by: ANESTHESIOLOGY

## 2020-03-09 RX ORDER — ACETAMINOPHEN 325 MG/1
650 TABLET ORAL EVERY 4 HOURS PRN
Status: DISCONTINUED | OUTPATIENT
Start: 2020-03-12 | End: 2020-03-11 | Stop reason: HOSPADM

## 2020-03-09 RX ORDER — SODIUM CHLORIDE 9 MG/ML
INJECTION, SOLUTION INTRAVENOUS CONTINUOUS
Status: DISCONTINUED | OUTPATIENT
Start: 2020-03-09 | End: 2020-03-11 | Stop reason: HOSPADM

## 2020-03-09 RX ORDER — GABAPENTIN 100 MG/1
200 CAPSULE ORAL 3 TIMES DAILY
Status: DISCONTINUED | OUTPATIENT
Start: 2020-03-09 | End: 2020-03-10

## 2020-03-09 RX ORDER — LISINOPRIL 20 MG/1
20 TABLET ORAL EVERY MORNING
COMMUNITY

## 2020-03-09 RX ORDER — PHENYTOIN SODIUM 100 MG/1
100 CAPSULE, EXTENDED RELEASE ORAL 3 TIMES DAILY
Status: DISCONTINUED | OUTPATIENT
Start: 2020-03-09 | End: 2020-03-11 | Stop reason: HOSPADM

## 2020-03-09 RX ORDER — CEFAZOLIN SODIUM 2 G/100ML
2 INJECTION, SOLUTION INTRAVENOUS
Status: COMPLETED | OUTPATIENT
Start: 2020-03-09 | End: 2020-03-09

## 2020-03-09 RX ORDER — HYDROMORPHONE HYDROCHLORIDE 1 MG/ML
.3-.5 INJECTION, SOLUTION INTRAMUSCULAR; INTRAVENOUS; SUBCUTANEOUS
Status: DISCONTINUED | OUTPATIENT
Start: 2020-03-09 | End: 2020-03-11 | Stop reason: HOSPADM

## 2020-03-09 RX ORDER — SODIUM CHLORIDE 9 MG/ML
INJECTION, SOLUTION INTRAVENOUS CONTINUOUS
Status: DISCONTINUED | OUTPATIENT
Start: 2020-03-09 | End: 2020-03-10

## 2020-03-09 RX ORDER — LISINOPRIL 20 MG/1
20 TABLET ORAL EVERY MORNING
Status: DISCONTINUED | OUTPATIENT
Start: 2020-03-10 | End: 2020-03-11 | Stop reason: HOSPADM

## 2020-03-09 RX ORDER — HYDRALAZINE HYDROCHLORIDE 20 MG/ML
10-20 INJECTION INTRAMUSCULAR; INTRAVENOUS EVERY 30 MIN PRN
Status: DISCONTINUED | OUTPATIENT
Start: 2020-03-09 | End: 2020-03-11 | Stop reason: HOSPADM

## 2020-03-09 RX ORDER — AMOXICILLIN 250 MG
1 CAPSULE ORAL 2 TIMES DAILY
Status: DISCONTINUED | OUTPATIENT
Start: 2020-03-09 | End: 2020-03-11 | Stop reason: HOSPADM

## 2020-03-09 RX ORDER — ONDANSETRON 2 MG/ML
4 INJECTION INTRAMUSCULAR; INTRAVENOUS EVERY 6 HOURS PRN
Status: DISCONTINUED | OUTPATIENT
Start: 2020-03-09 | End: 2020-03-11 | Stop reason: HOSPADM

## 2020-03-09 RX ORDER — OXYCODONE HYDROCHLORIDE 5 MG/1
5 TABLET ORAL EVERY 4 HOURS PRN
Status: DISCONTINUED | OUTPATIENT
Start: 2020-03-09 | End: 2020-03-09

## 2020-03-09 RX ORDER — SODIUM CHLORIDE, SODIUM LACTATE, POTASSIUM CHLORIDE, CALCIUM CHLORIDE 600; 310; 30; 20 MG/100ML; MG/100ML; MG/100ML; MG/100ML
INJECTION, SOLUTION INTRAVENOUS CONTINUOUS
Status: DISCONTINUED | OUTPATIENT
Start: 2020-03-09 | End: 2020-03-09 | Stop reason: HOSPADM

## 2020-03-09 RX ORDER — ATORVASTATIN CALCIUM 20 MG/1
20 TABLET, FILM COATED ORAL EVERY EVENING
Status: DISCONTINUED | OUTPATIENT
Start: 2020-03-09 | End: 2020-03-11 | Stop reason: HOSPADM

## 2020-03-09 RX ORDER — NALOXONE HYDROCHLORIDE 0.4 MG/ML
.1-.4 INJECTION, SOLUTION INTRAMUSCULAR; INTRAVENOUS; SUBCUTANEOUS
Status: DISCONTINUED | OUTPATIENT
Start: 2020-03-09 | End: 2020-03-09

## 2020-03-09 RX ORDER — VITAMIN B COMPLEX
1000 TABLET ORAL DAILY
Status: DISCONTINUED | OUTPATIENT
Start: 2020-03-10 | End: 2020-03-11 | Stop reason: HOSPADM

## 2020-03-09 RX ORDER — LIDOCAINE 40 MG/G
CREAM TOPICAL
Status: DISCONTINUED | OUTPATIENT
Start: 2020-03-09 | End: 2020-03-10

## 2020-03-09 RX ORDER — POLYETHYLENE GLYCOL 3350 17 G/17G
17 POWDER, FOR SOLUTION ORAL DAILY
Status: DISCONTINUED | OUTPATIENT
Start: 2020-03-10 | End: 2020-03-11 | Stop reason: HOSPADM

## 2020-03-09 RX ORDER — SODIUM CHLORIDE 9 MG/ML
INJECTION, SOLUTION INTRAVENOUS ONCE
Status: COMPLETED | OUTPATIENT
Start: 2020-03-09 | End: 2020-03-09

## 2020-03-09 RX ORDER — GABAPENTIN 100 MG/1
100 CAPSULE ORAL 2 TIMES DAILY
Status: DISCONTINUED | OUTPATIENT
Start: 2020-03-09 | End: 2020-03-09

## 2020-03-09 RX ORDER — PHENYTOIN SODIUM 100 MG/1
100 CAPSULE, EXTENDED RELEASE ORAL 3 TIMES DAILY
Status: ON HOLD | COMMUNITY
End: 2020-03-11

## 2020-03-09 RX ORDER — AMOXICILLIN 250 MG
2 CAPSULE ORAL 2 TIMES DAILY
Status: DISCONTINUED | OUTPATIENT
Start: 2020-03-09 | End: 2020-03-11 | Stop reason: HOSPADM

## 2020-03-09 RX ORDER — OXYCODONE HYDROCHLORIDE 5 MG/1
5-10 TABLET ORAL EVERY 4 HOURS PRN
Status: DISCONTINUED | OUTPATIENT
Start: 2020-03-09 | End: 2020-03-11 | Stop reason: HOSPADM

## 2020-03-09 RX ORDER — CEFAZOLIN SODIUM 1 G/3ML
1 INJECTION, POWDER, FOR SOLUTION INTRAMUSCULAR; INTRAVENOUS SEE ADMIN INSTRUCTIONS
Status: DISCONTINUED | OUTPATIENT
Start: 2020-03-09 | End: 2020-03-09 | Stop reason: HOSPADM

## 2020-03-09 RX ORDER — LIDOCAINE 40 MG/G
CREAM TOPICAL
Status: DISCONTINUED | OUTPATIENT
Start: 2020-03-09 | End: 2020-03-09 | Stop reason: HOSPADM

## 2020-03-09 RX ORDER — NALOXONE HYDROCHLORIDE 0.4 MG/ML
.1-.4 INJECTION, SOLUTION INTRAMUSCULAR; INTRAVENOUS; SUBCUTANEOUS
Status: DISCONTINUED | OUTPATIENT
Start: 2020-03-09 | End: 2020-03-11 | Stop reason: HOSPADM

## 2020-03-09 RX ORDER — ONDANSETRON 4 MG/1
4 TABLET, ORALLY DISINTEGRATING ORAL EVERY 6 HOURS PRN
Status: DISCONTINUED | OUTPATIENT
Start: 2020-03-09 | End: 2020-03-11 | Stop reason: HOSPADM

## 2020-03-09 RX ORDER — ACETAMINOPHEN 325 MG/1
975 TABLET ORAL EVERY 8 HOURS
Status: DISCONTINUED | OUTPATIENT
Start: 2020-03-09 | End: 2020-03-11 | Stop reason: HOSPADM

## 2020-03-09 RX ORDER — ATORVASTATIN CALCIUM 20 MG/1
20 TABLET, FILM COATED ORAL EVERY EVENING
COMMUNITY

## 2020-03-09 RX ADMIN — SODIUM CHLORIDE: 9 INJECTION, SOLUTION INTRAVENOUS at 04:46

## 2020-03-09 RX ADMIN — Medication 40 MG: at 19:27

## 2020-03-09 RX ADMIN — HYDROMORPHONE HYDROCHLORIDE 0.3 MG: 1 INJECTION, SOLUTION INTRAMUSCULAR; INTRAVENOUS; SUBCUTANEOUS at 21:20

## 2020-03-09 RX ADMIN — SODIUM CHLORIDE 1000 ML: 9 INJECTION, SOLUTION INTRAVENOUS at 02:31

## 2020-03-09 RX ADMIN — SODIUM CHLORIDE, POTASSIUM CHLORIDE, SODIUM LACTATE AND CALCIUM CHLORIDE: 600; 310; 30; 20 INJECTION, SOLUTION INTRAVENOUS at 19:13

## 2020-03-09 RX ADMIN — ATORVASTATIN CALCIUM 20 MG: 20 TABLET, FILM COATED ORAL at 21:26

## 2020-03-09 RX ADMIN — Medication 35 MG: at 19:34

## 2020-03-09 RX ADMIN — MIDAZOLAM 1 MG: 1 INJECTION INTRAMUSCULAR; INTRAVENOUS at 19:06

## 2020-03-09 RX ADMIN — CEFAZOLIN 2 G: 10 INJECTION, POWDER, FOR SOLUTION INTRAVENOUS at 19:13

## 2020-03-09 RX ADMIN — GABAPENTIN 200 MG: 100 CAPSULE ORAL at 21:26

## 2020-03-09 RX ADMIN — SODIUM CHLORIDE: 9 INJECTION, SOLUTION INTRAVENOUS at 22:29

## 2020-03-09 RX ADMIN — MIDAZOLAM 1 MG: 1 INJECTION INTRAMUSCULAR; INTRAVENOUS at 19:13

## 2020-03-09 RX ADMIN — Medication 35 MG: at 19:43

## 2020-03-09 ASSESSMENT — ENCOUNTER SYMPTOMS
CONFUSION: 0
DYSURIA: 0
SHORTNESS OF BREATH: 0
SORE THROAT: 0
BACK PAIN: 0
FEVER: 0
ABDOMINAL PAIN: 0

## 2020-03-09 ASSESSMENT — ACTIVITIES OF DAILY LIVING (ADL)
AMBULATION: 0-->INDEPENDENT
SWALLOWING: 0-->SWALLOWS FOODS/LIQUIDS WITHOUT DIFFICULTY
RETIRED_COMMUNICATION: 0-->UNDERSTANDS/COMMUNICATES WITHOUT DIFFICULTY
TOILETING: 0-->INDEPENDENT
WHICH_OF_THE_ABOVE_FUNCTIONAL_RISKS_HAD_A_RECENT_ONSET_OR_CHANGE?: EATING
TRANSFERRING: 0-->INDEPENDENT
FALL_HISTORY_WITHIN_LAST_SIX_MONTHS: NO
RETIRED_EATING: 0-->INDEPENDENT
COGNITION: 0 - NO COGNITION ISSUES REPORTED
DRESS: 0-->INDEPENDENT
BATHING: 0-->INDEPENDENT

## 2020-03-09 ASSESSMENT — MIFFLIN-ST. JEOR: SCORE: 1110.09

## 2020-03-09 NOTE — ANESTHESIA PREPROCEDURE EVALUATION
Anesthesia Pre-Procedure Evaluation    Patient: Sheila Barraza   MRN:     [id number] Gender:   female   Age:    69 year old :      1950        Preoperative Diagnosis: Trigeminal anesthesia [G50.8]   Procedure(s):  left Radiofrequency rhizotomy     LABS:  CBC:   Lab Results   Component Value Date    WBC 9.2 2020    WBC 10.0 2020    HGB 13.4 2020    HGB 11.8 2020    HCT 40.1 2020    HCT 33.7 (L) 2020     2020     2020     BMP:   Lab Results   Component Value Date     2020     2020    POTASSIUM 4.4 2020    POTASSIUM 3.9 2020    CHLORIDE 105 2020    CHLORIDE 102 2020    CO2 27 2020    CO2 27 2020    BUN 16 2020    BUN 16 2020    CR 0.84 2020    CR 0.76 2020     (H) 2020     (H) 2020     COAGS:   Lab Results   Component Value Date    PTT 28 2020    INR 1.00 2020    FIBR 478 (H) 2020     POC:   Lab Results   Component Value Date    BGM 89 2020     OTHER:   Lab Results   Component Value Date    PH 7.38 2020    LACT 2.5 (H) 2020    A1C 6.0 (H) 01/15/2020    MICHAEL 9.4 2020    PHOS 2.9 2020    MAG 2.2 2020    ALBUMIN 3.7 2020    PROTTOTAL 7.1 2020    ALT 16 2020    AST 7 2020    ALKPHOS 88 2020    BILITOTAL 0.4 2020    TSH 0.82 2020    T4 6.5 2020        Preop Vitals    BP Readings from Last 3 Encounters:   20 138/71   20 117/60   20 109/55    Pulse Readings from Last 3 Encounters:   20 81   20 101   20 66      Resp Readings from Last 3 Encounters:   20 16   20 20   20 16    SpO2 Readings from Last 3 Encounters:   20 98%   20 98%   20 98%      Temp Readings from Last 1 Encounters:   20 35.9  C (96.7  F) (Oral)    Ht Readings from Last 1 Encounters:   20 1.651 m (5'  "5\")      Wt Readings from Last 1 Encounters:   03/09/20 59.4 kg (131 lb)    Estimated body mass index is 21.8 kg/m  as calculated from the following:    Height as of this encounter: 1.651 m (5' 5\").    Weight as of this encounter: 59.4 kg (131 lb).     LDA:  Peripheral IV 03/09/20 Right Hand (Active)   Site Assessment WDL 03/09/20 1700   Line Status Saline locked 03/09/20 1700   Phlebitis Scale 0-->no symptoms 03/09/20 1700   Infiltration Scale 0 03/09/20 1700   Number of days: 0        Past Medical History:   Diagnosis Date     Dyslipidemia      Hypertension       Past Surgical History:   Procedure Laterality Date     CRANIOTOMY, SUBOCCIPITAL N/A 1/22/2020    Procedure: STEALTH GUIDED SUBOCCIPITAL CRANIOTOMY FOR RESECTION OF CAVERNOUS MALFORMATION WITH NEUROMONITORING;  Surgeon: Catarina Prabhakar MD;  Location: [facility] OR     HERNIA REPAIR        Allergies   Allergen Reactions     Beta Adrenergic Blockers      Lasix [Furosemide]      Nuts      Phenol Other (See Comments)     Sulfa Drugs      Tegretol [Carbamazepine]         Anesthesia Evaluation     . Pt has had prior anesthetic. Type: General and MAC    No history of anesthetic complications          ROS/MED HX    ENT/Pulmonary:     (+)sleep apnea, uses CPAP , . .    Neurologic: Comment: H/o brainstem cavernous malformation s/p rupture and surgical intervention. Pt reports minimal eye changes/residual deficits        Cardiovascular:     (+) Dyslipidemia, hypertension----. : . . . :. .       METS/Exercise Tolerance:  >4 METS   Hematologic:         Musculoskeletal:         GI/Hepatic:     (+) Inflammatory bowel disease,       Renal/Genitourinary:  - ROS Renal section negative       Endo:  - neg endo ROS       Psychiatric:         Infectious Disease:         Malignancy:      - no malignancy   Other:                         PHYSICAL EXAM:   Mental Status/Neuro: A/A/O; Age Appropriate   Airway: Facies: Feasible  Mallampati: II  Mouth/Opening: Full  TM distance: > " 6 cm  Neck ROM: Full   Respiratory: Auscultation: CTAB     Resp. Rate: Normal     Resp. Effort: Normal      CV: Rhythm: Regular  Rate: Age appropriate  Heart: Normal Sounds  Edema: None   Comments:      Dental: Details                  Assessment:   ASA SCORE: 3    H&P: History and physical reviewed and following examination; no interval change.   Smoking Status:  Non-Smoker/Unknown   NPO Status: NPO Appropriate     Plan:   Anes. Type:  MAC   Pre-Medication: None   Induction:  N/a   Airway: Native Airway   Access/Monitoring: PIV   Maintenance: N/a     Postop Plan:   Postop Pain: None  Postop Sedation/Airway: Not planned     PONV Management:   Adult Risk Factors: Female, Non-Smoker     CONSENT: Direct conversation   Plan and risks discussed with: Patient   Blood Products: Consent Deferred (Minimal Blood Loss)                   Sarah Wachter, MD

## 2020-03-09 NOTE — ED NOTES
Patient signed out to me to follow-up on neurosurgery recommendations.  Patient has trigeminal neuralgia and has been status post rhizotomies.  Presented for facial pain.  Awaiting neurosurgery recommendations for possible procedure.  Following neurosurgery evaluation they are planning to take the patient for rhizotomy today but will not be able to perform this until 4 PM.  I recommended observation status as the procedure may go into the late evening and it is unclear if she would be able to be discharged this evening following the procedure and also due to socially living 4 to 5 hours away.  Discussed with the resident that I will discuss with the ED obs to see if they have any beds if not I will place an obs bed status under neurosurgery.  ED obs has no beds.  Patient will be placed under neurosurgery observation status but will likely due to the current bed situation be taken from the ER to her procedure this afternoon.     Mark Johnston MD  03/09/20 0901

## 2020-03-09 NOTE — PROGRESS NOTES
"PREOP RN NOTE  Assigned as pt pre op nurse prior to pt having rhizotomy       Report for 6A RN Ramon W via telephone @ 1529  Assessment will be placed. BG obtained. Pt will leave all belongings and come down with nothing.   VS completed @ 1404  Consent still needs to be signed  PIV 20g in Right hand SL    @ 1740 Dr. Rueda txt paged the following:  \"Sheila Barraza is coming down to pre-op from 6A. Pt needs case request, consent signed, and pre op orders placed. Isaiah MULLEN *[id number] or [phone]\"  @ 1745 neuro called stated they would place case request and orders.     2g Ancef hanging on back of cart    Pt in Pre-op @ 1755. NST assisting pt with getting to cart and into gown @ this time.   Pt screaming on arrival to 3C r/t pain     Dr. Shahab Rueda @ bedside to sign consent and sandra sites @ 1756  Dr. Rueda aware of pt pain and screaming related to that pain.     Consents signed and witnessed @ this time.   Sites marked on sheet and on pt    Dentures and eyeglasses brought down with pt and remain with pt currently are in pt chart    Pt refuses to have family come back to pre op @ this time (1820) inform the pt to let me know if she changes her mind and would like family to come back prior to procedure.     Neuro assessment appears unchanged from most recent assessment     Currently delayed due to anesthesia staffing     Report given to Wilman MULLEN @ 1840  "

## 2020-03-09 NOTE — ED PROVIDER NOTES
History     Chief Complaint   Patient presents with     Facial Pain     HPI  Sheila Barraza is a 69 year old female who has a past medical history of HTN, HLD, IBS, Trigeminal neuralgia s/p two rhizotomies ([facility]), brainstem cavernoma status post hemorrhage now resected via suboccipital craniotomy on 1/22/20 who presents to the ED from home with c/o of facial pain.  Patient reports a history of left-sided trigeminal neuralgia and has had increasing pain since last Monday, 3/2/2020.  Patient describes the pain as an intermittent sharp shooting pain described as an electric shock.  No aggravating or relieving factors.  Patient reports that pain has progressively getting worse with increasing episodes and frequencies over the past 1 week.  Patient has been unable to eat for the past 3 days and is unable to talk due to the pain.  Patient was started on Dilantin and gabapentin last Monday.  Patient was also seen in the emergency department in [place] hometown [place] and received a push dose of Dilantin 500 mg on 3/2/2020 along with a 1000 mg push dose yesterday afternoon around 1603/8/2020.  Patient still with worsening pain so drove to the [facility] for further evaluation.  Patient denies any fever, chills, cough or cold-like symptoms, denies any chest pain, shortness of breath, abdominal pain, nausea, vomiting, diarrhea.  Patient complains of generalized weakness.  Patient has been in rehab since her brain surgery the end of January and family notes that she is so weak she is unable to walk on her own anymore.    Patient is currently scheduled for a Left Rhizotomy on 3/17/2020 with Dr. Pierre    I have reviewed the Medications, Allergies, Past Medical and Surgical History, and Social History in the Epic system.    Review of Systems   Constitutional: Negative for fever.   HENT: Negative for sore throat.    Eyes: Negative for visual disturbance.   Respiratory: Negative for shortness of  breath.    Cardiovascular: Negative for chest pain.   Gastrointestinal: Negative for abdominal pain.   Endocrine: Negative for polyuria.   Genitourinary: Negative for dysuria.   Musculoskeletal: Negative for back pain.   Skin: Negative for rash.   Allergic/Immunologic: Negative for immunocompromised state.   Neurological:        +facial pain, trigeminal neuralgia; generalized weakness   Psychiatric/Behavioral: Negative for confusion.     Physical Exam   BP: 121/63  Pulse: 101  Temp: 98.2  F (36.8  C)  Resp: 14  SpO2: 96 %      Physical Exam  General: Afebrile, mild distress 2/2 to pain.   HENT: MMM, scattered white nontender plaques over tongue diffusely  Eyes: PERRL, normal sclerae  Neck: non-tender, supple  Cardio: regular rate. Regular rhythm. Extremities well perfused  Resp: Normal work of breathing, clear breath sounds  Chest/Back: no visual signs of trauma, no CVA tenderness  Abdomen: no tenderness, non-distended, no rebound, no guarding  Neuro: alert and fully oriented. CN II-XII grossly intact. Grossly normal strength and sensation in all extremities.   MSK: no deformities.   Integumentary/Skin: no rash visualized, normal color  Psych: normal affect, normal behavior    ED Course        Procedures  .  Results for orders placed or performed during the hospital encounter of 03/09/20   CBC with platelets differential     Status: Abnormal   Result Value Ref Range    WBC 9.2 4.0 - 11.0 10e9/L    RBC Count 3.98 3.8 - 5.2 10e12/L    Hemoglobin 13.4 11.7 - 15.7 g/dL    Hematocrit 40.1 35.0 - 47.0 %     (H) 78 - 100 fl    MCH 33.7 (H) 26.5 - 33.0 pg    MCHC 33.4 31.5 - 36.5 g/dL    RDW 13.3 10.0 - 15.0 %    Platelet Count 302 150 - 450 10e9/L    Diff Method Automated Method     % Neutrophils 68.8 %    % Lymphocytes 22.6 %    % Monocytes 6.5 %    % Eosinophils 0.7 %    % Basophils 0.7 %    % Immature Granulocytes 0.7 %    Nucleated RBCs 0 0 /100    Absolute Neutrophil 6.4 1.6 - 8.3 10e9/L    Absolute Lymphocytes  2.1 0.8 - 5.3 10e9/L    Absolute Monocytes 0.6 0.0 - 1.3 10e9/L    Absolute Eosinophils 0.1 0.0 - 0.7 10e9/L    Absolute Basophils 0.1 0.0 - 0.2 10e9/L    Abs Immature Granulocytes 0.1 0 - 0.4 10e9/L    Absolute Nucleated RBC 0.0    Phenytoin level     Status: Abnormal   Result Value Ref Range    Phenytoin Level 23.4 (H) 10 - 20 mg/L   Comprehensive metabolic panel     Status: Abnormal   Result Value Ref Range    Sodium 140 133 - 144 mmol/L    Potassium 4.4 3.4 - 5.3 mmol/L    Chloride 105 94 - 109 mmol/L    Carbon Dioxide 27 20 - 32 mmol/L    Anion Gap 8 3 - 14 mmol/L    Glucose 129 (H) 70 - 99 mg/dL    Urea Nitrogen 16 7 - 30 mg/dL    Creatinine 0.84 0.52 - 1.04 mg/dL    GFR Estimate 70 >60 mL/min/[1.73_m2]    GFR Estimate If Black 81 >60 mL/min/[1.73_m2]    Calcium 9.4 8.5 - 10.1 mg/dL    Bilirubin Total 0.4 0.2 - 1.3 mg/dL    Albumin 3.7 3.4 - 5.0 g/dL    Protein Total 7.1 6.8 - 8.8 g/dL    Alkaline Phosphatase 88 40 - 150 U/L    ALT 16 0 - 50 U/L    AST 7 0 - 45 U/L         Assessments & Plan (with Medical Decision Making)   Sheila Barraza is a 69 year old female who has a past medical history of HTN, HLD, IBS, Trigeminal neuralgia s/p two rhizotomies ([facility]), brainstem cavernoma status post hemorrhage now resected via suboccipital craniotomy on 1/22/20 who presents to the ED from home with c/o of facial pain, worsening trigeminal neuralgia.  Upon arrival patient is well-appearing, afebrile, moderate distress secondary to pain.  Patient has been taking gabapentin and Dilantin with no relief of her pain.  Patient is currently scheduled for a Left Rhizotomy on 3/17/2020 with Dr. Ignacio NEWTON discussed the case with neurosurgery resident who evaluated the patient in the emergency department.  At this time will keep patient n.p.o., maintenance IV fluids, for possible procedure today.  Pending staff with neurosurgery on morning rounds.     I have reviewed the nursing notes.    I have reviewed the findings,  diagnosis, plan and need for follow up with the patient.    New Prescriptions    No medications on file       Final diagnoses:   Trigeminal neuralgia       3/9/2020   [facility], [place], EMERGENCY DEPARTMENT     Sandi Vargas MD  03/09/20 0716

## 2020-03-09 NOTE — PHARMACY-ADMISSION MEDICATION HISTORY
Admission medication history interview status for the 3/9/2020 admission is complete. See Epic admission navigator for allergy information, pharmacy, prior to admission medications and immunization status.     Medication history interview sources:  patient, and SureScripts    Changes made to PTA medication list (reason)  Added: None  Deleted: hydroxyzine (patient reports no longer taking at home)  Changed: phenytoin (patient reports she was recently switched to taking this TID from BID)    Additional medication history information (including reliability of information, actions taken by pharmacist):  - Patient was only allowed to answer with one word of short phrases per her nurse. Patient was able to confirm med list via 'yes' and 'no' questions. Patient was knowledgeable about what medications she takes and when she last took them. Allergies are accurate per patient.  - Patient was not allowed to take any meds today and said her last doses of her medications was yesterday.      Prior to Admission medications    Medication Sig Last Dose Taking? Auth Provider   acetaminophen (TYLENOL) 325 MG tablet 1-2 tablets (325-650 mg) by Oral or Feeding Tube route every 4 hours as needed for mild pain, fever or headaches Past Week at Unknown time Yes Iliana Heart APRN CNP   atorvastatin (LIPITOR) 20 MG tablet Take 20 mg by mouth every evening 3/8/2020 at PM Yes Unknown, Entered By History   calcium carbonate (TUMS) 500 MG chewable tablet Take 2 tablets (1,000 mg) by mouth 3 times daily as needed for heartburn Past Month at Unknown time Yes Iliana Heart APRN CNP   cinnamon 500 MG TABS Take 1,000 mg by mouth daily 3/8/2020 at AM Yes Unknown, Entered By History   gabapentin (NEURONTIN) 100 MG capsule Take 1 capsule (100 mg) by mouth 2 times daily 3/8/2020 at PM Yes Jarek Pierre MD   lisinopril (ZESTRIL) 20 MG tablet Take 20 mg by mouth every morning 3/8/2020 at AM Yes Unknown, Entered By History    methylcellulose (CITRUCEL) 500 MG TABS tablet Take 1 tablet (500 mg) by mouth daily 3/8/2020 at AM Yes Iliana Heart APRN CNP   phenytoin (DILANTIN) 100 MG capsule Take 100 mg by mouth 3 times daily  3/8/2020 at AM Yes Reported, Patient   Vitamin D, Cholecalciferol, 25 MCG (1000 UT) TABS Take 1,000 Units by mouth daily 3/8/2020 at AM Yes Unknown, Entered By History       Medication history completed by:   Xiomy Craig  PharmD Student  March 9, 2020

## 2020-03-09 NOTE — ED NOTES
[facility], [place]   ED Nurse to Floor Handoff     Sheila Barraza is a 69 year old female who speaks English and lives with a spouse,  in a home  They arrived in the ED by car from home    ED Chief Complaint: Facial Pain    ED Dx;   Final diagnoses:   Trigeminal neuralgia         Needed?: No    Allergies:   Allergies   Allergen Reactions     Beta Adrenergic Blockers      Lasix [Furosemide]      Nuts      Phenol Other (See Comments)     Sulfa Drugs      Tegretol [Carbamazepine]    .  Past Medical Hx:   Past Medical History:   Diagnosis Date     Dyslipidemia      Hypertension       Baseline Mental status: WDL  Current Mental Status changes: at basesline    Infection present or suspected this encounter: no  Sepsis suspected: No  Isolation type: No active isolations     Activity level - Baseline/Home:  Independent  Activity Level - Current:   Wheelchair assist of one, generalized weakness    Bariatric equipment needed?: No    In the ED these meds were given:   Medications   0.9% sodium chloride BOLUS (0 mLs Intravenous Stopped 3/9/20 0445)   sodium chloride 0.9% infusion ( Intravenous Canceled Entry 3/9/20 0607)       Drips running?  Yes    Home pump  No    Current LDAs  Peripheral IV 03/09/20 Right Hand (Active)   Site Assessment WDL 03/09/20 0225   Line Status Saline locked 03/09/20 0225   Phlebitis Scale 0-->no symptoms 03/09/20 0225   Infiltration Scale 0 03/09/20 0225   Number of days: 0       Wound 01/27/20 Inner;Lower Lip Ulceration (Active)   Number of days: 42       Incision/Surgical Site 01/22/20 Head (Active)   Number of days: 47       Labs results:   Labs Ordered and Resulted from Time of ED Arrival Up to the Time of Departure from the ED   CBC WITH PLATELETS DIFFERENTIAL - Abnormal; Notable for the following components:       Result Value     (*)     MCH 33.7 (*)     All other components within normal limits   PHENYTOIN LEVEL - Abnormal; Notable for the  following components:    Phenytoin Level 23.4 (*)     All other components within normal limits   COMPREHENSIVE METABOLIC PANEL - Abnormal; Notable for the following components:    Glucose 129 (*)     All other components within normal limits   PARTIAL THROMBOPLASTIN TIME   INR   ABO/RH TYPE AND SCREEN       Imaging Studies: No results found for this or any previous visit (from the past 24 hour(s)).    Recent vital signs:   /74   Pulse 72   Temp 98.2  F (36.8  C) (Oral)   Resp 14   LMP  (LMP Unknown)   SpO2 98%     [place] Coma Scale Score: 15 (03/09/20 0223)       Cardiac Rhythm: Other  Pt needs tele? No  Skin/wound Issues: None    Code Status: Full Code    Pain control: pt had none    Nausea control: pt had none    Abnormal labs/tests/findings requiring intervention: none    Family present during ED course? Yes   Family Comments/Social Situation comments:  at bedside and supportive     Tasks needing completion: None   NPO STATUS FOR PROCEDURE AT 1600    Payton Hernandez RN    [phone] [facility]

## 2020-03-09 NOTE — H&P
[facility]       NEUROSURGERY H&P NOTE    HPI:  Ms. Barraza is a 68 yo F with PMH HTN, HLD, left trigeminal neuralgia s/p 2 rhizotomies at [facility], brainstem cavernoma s/p resection 1/22/20 with Dr. Prabhakar presenting with recurrent trigeminal pain. Patient states her last rhizotomy provided pain relief for about a year, but has been having recurrent pain in the V1-2 distribution for the past few days. The episodes are sensitive to touch such as brushing her teeth, and she has not had much to eat or drink in the past 3 days due to the pain. She went to outside EDs who gave her dilantin and increased her gabapentin. Her pain is still significant so her daughter drove her to [facility] from 500 miles away for management of her trigeminal pain. Scheduled for left rhizotomy 3/17/20 with Dr. Pierre, but the pain is severe now, and feels she is unable to wait, hoping to have the rhizotomy done sooner. In regards to the cavernoma, she is doing well with outpatient therapies, and feels no weakness, or other neurological symptoms, except some numbness in her right hand.    PAST MEDICAL HISTORY:   Past Medical History:   Diagnosis Date     Dyslipidemia      Hypertension        PAST SURGICAL HISTORY:   Past Surgical History:   Procedure Laterality Date     CRANIOTOMY, SUBOCCIPITAL N/A 1/22/2020    Procedure: STEALTH GUIDED SUBOCCIPITAL CRANIOTOMY FOR RESECTION OF CAVERNOUS MALFORMATION WITH NEUROMONITORING;  Surgeon: Catarina Prabhakar MD;  Location: [facility] OR     HERNIA REPAIR         FAMILY HISTORY:   Family History   Problem Relation Age of Onset     Myocardial Infarction Father 54       SOCIAL HISTORY:   Social History     Tobacco Use     Smoking status: Never Smoker     Smokeless tobacco: Never Used   Substance Use Topics     Alcohol use: Not on file       MEDICATIONS:  Current Outpatient Medications   Medication Sig Dispense Refill     phenytoin (DILANTIN) 100 MG capsule Take  100 mg by mouth 2 times daily        acetaminophen (TYLENOL) 325 MG tablet 1-2 tablets (325-650 mg) by Oral or Feeding Tube route every 4 hours as needed for mild pain, fever or headaches       atorvastatin (LIPITOR) 20 MG tablet Take 1 tablet (20 mg) by mouth every evening 30 tablet 0     calcium carbonate (TUMS) 500 MG chewable tablet Take 2 tablets (1,000 mg) by mouth 3 times daily as needed for heartburn       cinnamon 500 MG TABS Take 1,000 mg by mouth daily       gabapentin (NEURONTIN) 100 MG capsule Take 1 capsule (100 mg) by mouth 2 times daily 60 capsule 1     lisinopril (PRINIVIL/ZESTRIL) 20 MG tablet Take 1 tablet (20 mg) by mouth daily 30 tablet 0     methylcellulose (CITRUCEL) 500 MG TABS tablet Take 1 tablet (500 mg) by mouth daily       Vitamin D, Cholecalciferol, 25 MCG (1000 UT) TABS Take 1,000 Units by mouth daily         Allergies:  Allergies   Allergen Reactions     Beta Adrenergic Blockers      Lasix [Furosemide]      Nuts      Phenol Other (See Comments)     Sulfa Drugs      Tegretol [Carbamazepine]        ROS: 10 point ROS of systems including Constitutional, Eyes, Respiratory, Cardiovascular, Gastroenterology, Genitourinary, Integumentary, Muscularskeletal, Psychiatric were all negative except for pertinent positives noted in my HPI.    Physical exam:   Blood pressure 116/65, pulse 85, temperature 98.2  F (36.8  C), temperature source Oral, resp. rate 14, SpO2 96 %.  CV: Regular rate  PULM: breathing comfortably on room air  NEUROLOGIC:  -- Awake; Alert; oriented x 3  -- Follows commands briskly  -- Speech fluent, spontaneous. No aphasia or dysarthria.  -- no gaze preference. No apparent hemineglect.  Cranial Nerves:  -- visual fields full to confrontation, PERRL 3-2mm bilat and brisk, extraocular movements intact  -- face symmetrical, tongue midline  -- baseline numbness in left V1-V3 distributions  -- palate elevates symmetrically, uvula midline  -- hearing grossly intact bilat  -- Trapezii  5/5 strength bilat symmetric  -- Cerebellar: Finger nose finger without dysmetria    Motor:  Normal bulk / tone; no tremor, rigidity, or bradykinesia.  No muscle wasting or fasciculations  No Pronator Drift     Delt Bi Tri Hand Flexion/  Extension Iliopsoas Quadriceps Hamstrings Tibialis Anterior Gastroc    C5 C6 C7 C8/T1 L2 L3 L4-S1 L4 S1   R 5 5 5 5 5 5 5 5 5   L 5 5 5 5 5 5 5 5 5   Sensory:  intact to LT x 4 extremities, except baseline numbness to right hand    Reflexes:     Bi Tri BR Rayshawn Pat Ach Bab    C5-6 C7-8 C6 UMN L2-4 S1 UMN   R 2+ 2+ 2+ Norm 2+ 2+ Norm   L 2+ 2+ 2+ Norm 2+ 2+ Norm     Gait: deferred      IMAGING:  none    LABS:  Phenytoin level 23.4 (high)    ASSESSMENT:  Ms. Barraza is a 70 yo F with PMH HTN, HLD, left trigeminal neuralgia s/p 2 rhizotomies at [facility], brainstem cavernoma s/p resection 1/22/20 with Dr. Prabhakar presenting with recurrent trigeminal pain.    RECOMMENDATIONS:  - admit to 6A  - plan for left radiofrequency rhizotomy today  - NPO    The patient was discussed with Dr. Gage, neurosurgery chief resident, and Dr. Pierre, neurosurgery faculty, and they agree with the above.    Galileo Roque MD  Neurosurgery Resident PGY2

## 2020-03-10 ENCOUNTER — ANESTHESIA (OUTPATIENT)
Dept: SURGERY | Facility: CLINIC | Age: 70
DRG: 042 | End: 2020-03-10
Payer: MEDICARE

## 2020-03-10 ENCOUNTER — ANESTHESIA EVENT (OUTPATIENT)
Dept: SURGERY | Facility: CLINIC | Age: 70
DRG: 042 | End: 2020-03-10
Payer: MEDICARE

## 2020-03-10 ENCOUNTER — APPOINTMENT (OUTPATIENT)
Dept: INTERVENTIONAL RADIOLOGY/VASCULAR | Facility: CLINIC | Age: 70
DRG: 042 | End: 2020-03-10
Attending: NEUROLOGICAL SURGERY
Payer: MEDICARE

## 2020-03-10 LAB
ANION GAP SERPL CALCULATED.3IONS-SCNC: 5 MMOL/L (ref 3–14)
BASOPHILS # BLD AUTO: 0.1 10E9/L (ref 0–0.2)
BASOPHILS NFR BLD AUTO: 0.6 %
BUN SERPL-MCNC: 10 MG/DL (ref 7–30)
CALCIUM SERPL-MCNC: 9.2 MG/DL (ref 8.5–10.1)
CHLORIDE SERPL-SCNC: 105 MMOL/L (ref 94–109)
CO2 SERPL-SCNC: 27 MMOL/L (ref 20–32)
CREAT SERPL-MCNC: 0.76 MG/DL (ref 0.52–1.04)
DIFFERENTIAL METHOD BLD: ABNORMAL
EOSINOPHIL # BLD AUTO: 0.1 10E9/L (ref 0–0.7)
EOSINOPHIL NFR BLD AUTO: 1.3 %
ERYTHROCYTE [DISTWIDTH] IN BLOOD BY AUTOMATED COUNT: 12.6 % (ref 10–15)
GFR SERPL CREATININE-BSD FRML MDRD: 79 ML/MIN/{1.73_M2}
GLUCOSE BLDC GLUCOMTR-MCNC: 126 MG/DL (ref 70–99)
GLUCOSE BLDC GLUCOMTR-MCNC: 80 MG/DL (ref 70–99)
GLUCOSE SERPL-MCNC: 112 MG/DL (ref 70–99)
HCT VFR BLD AUTO: 37.2 % (ref 35–47)
HGB BLD-MCNC: 12.7 G/DL (ref 11.7–15.7)
IMM GRANULOCYTES # BLD: 0 10E9/L (ref 0–0.4)
IMM GRANULOCYTES NFR BLD: 0.5 %
LYMPHOCYTES # BLD AUTO: 1.7 10E9/L (ref 0.8–5.3)
LYMPHOCYTES NFR BLD AUTO: 19.6 %
MAGNESIUM SERPL-MCNC: 2.1 MG/DL (ref 1.6–2.3)
MCH RBC QN AUTO: 33.9 PG (ref 26.5–33)
MCHC RBC AUTO-ENTMCNC: 34.1 G/DL (ref 31.5–36.5)
MCV RBC AUTO: 99 FL (ref 78–100)
MONOCYTES # BLD AUTO: 0.6 10E9/L (ref 0–1.3)
MONOCYTES NFR BLD AUTO: 7 %
NEUTROPHILS # BLD AUTO: 6.1 10E9/L (ref 1.6–8.3)
NEUTROPHILS NFR BLD AUTO: 71 %
NRBC # BLD AUTO: 0 10*3/UL
NRBC BLD AUTO-RTO: 0 /100
PHENYTOIN SERPL-MCNC: 17 MG/L (ref 10–20)
PHOSPHATE SERPL-MCNC: 3.7 MG/DL (ref 2.5–4.5)
PLATELET # BLD AUTO: 229 10E9/L (ref 150–450)
POTASSIUM SERPL-SCNC: 4.1 MMOL/L (ref 3.4–5.3)
RBC # BLD AUTO: 3.75 10E12/L (ref 3.8–5.2)
SODIUM SERPL-SCNC: 137 MMOL/L (ref 133–144)
WBC # BLD AUTO: 8.6 10E9/L (ref 4–11)

## 2020-03-10 PROCEDURE — 25800030 ZZH RX IP 258 OP 636: Performed by: STUDENT IN AN ORGANIZED HEALTH CARE EDUCATION/TRAINING PROGRAM

## 2020-03-10 PROCEDURE — 00000146 ZZHCL STATISTIC GLUCOSE BY METER IP

## 2020-03-10 PROCEDURE — 25000128 H RX IP 250 OP 636: Performed by: NURSE ANESTHETIST, CERTIFIED REGISTERED

## 2020-03-10 PROCEDURE — 37000008 ZZH ANESTHESIA TECHNICAL FEE, 1ST 30 MIN: Performed by: NEUROLOGICAL SURGERY

## 2020-03-10 PROCEDURE — 27210891 ZZH BALLOON (NON-PTA) CR6

## 2020-03-10 PROCEDURE — 85025 COMPLETE CBC W/AUTO DIFF WBC: CPT | Performed by: NEUROLOGICAL SURGERY

## 2020-03-10 PROCEDURE — 36415 COLL VENOUS BLD VENIPUNCTURE: CPT | Performed by: NURSE PRACTITIONER

## 2020-03-10 PROCEDURE — 77002 NEEDLE LOCALIZATION BY XRAY: CPT

## 2020-03-10 PROCEDURE — 25000565 ZZH ISOFLURANE, EA 15 MIN: Performed by: NEUROLOGICAL SURGERY

## 2020-03-10 PROCEDURE — 25000125 ZZHC RX 250: Performed by: NURSE ANESTHETIST, CERTIFIED REGISTERED

## 2020-03-10 PROCEDURE — 25000128 H RX IP 250 OP 636: Performed by: STUDENT IN AN ORGANIZED HEALTH CARE EDUCATION/TRAINING PROGRAM

## 2020-03-10 PROCEDURE — 83735 ASSAY OF MAGNESIUM: CPT | Performed by: NEUROLOGICAL SURGERY

## 2020-03-10 PROCEDURE — 25000132 ZZH RX MED GY IP 250 OP 250 PS 637: Mod: GY | Performed by: STUDENT IN AN ORGANIZED HEALTH CARE EDUCATION/TRAINING PROGRAM

## 2020-03-10 PROCEDURE — 25000128 H RX IP 250 OP 636: Performed by: NURSE PRACTITIONER

## 2020-03-10 PROCEDURE — 36415 COLL VENOUS BLD VENIPUNCTURE: CPT | Performed by: NEUROLOGICAL SURGERY

## 2020-03-10 PROCEDURE — 27211039 ZZH NEEDLE CR2

## 2020-03-10 PROCEDURE — 71000014 ZZH RECOVERY PHASE 1 LEVEL 2 FIRST HR: Performed by: NEUROLOGICAL SURGERY

## 2020-03-10 PROCEDURE — 27210759 ZZH DEVICE INFLATION CR6

## 2020-03-10 PROCEDURE — 008K3ZZ DIVISION OF TRIGEMINAL NERVE, PERCUTANEOUS APPROACH: ICD-10-PCS | Performed by: NEUROLOGICAL SURGERY

## 2020-03-10 PROCEDURE — 12000001 ZZH R&B MED SURG/OB UMMC

## 2020-03-10 PROCEDURE — 25800030 ZZH RX IP 258 OP 636: Performed by: NURSE ANESTHETIST, CERTIFIED REGISTERED

## 2020-03-10 PROCEDURE — 37000009 ZZH ANESTHESIA TECHNICAL FEE, EACH ADDTL 15 MIN: Performed by: NEUROLOGICAL SURGERY

## 2020-03-10 PROCEDURE — 80048 BASIC METABOLIC PNL TOTAL CA: CPT | Performed by: NEUROLOGICAL SURGERY

## 2020-03-10 PROCEDURE — 80185 ASSAY OF PHENYTOIN TOTAL: CPT | Performed by: NURSE PRACTITIONER

## 2020-03-10 PROCEDURE — 27210794 ZZH OR GENERAL SUPPLY STERILE: Performed by: NEUROLOGICAL SURGERY

## 2020-03-10 PROCEDURE — 27210795 ZZH PAD DEFIB QUICK CR4

## 2020-03-10 PROCEDURE — 25800030 ZZH RX IP 258 OP 636: Performed by: NURSE PRACTITIONER

## 2020-03-10 PROCEDURE — 40000170 ZZH STATISTIC PRE-PROCEDURE ASSESSMENT II: Performed by: NEUROLOGICAL SURGERY

## 2020-03-10 PROCEDURE — 77002 NEEDLE LOCALIZATION BY XRAY: CPT | Mod: TC

## 2020-03-10 PROCEDURE — 84100 ASSAY OF PHOSPHORUS: CPT | Performed by: NEUROLOGICAL SURGERY

## 2020-03-10 PROCEDURE — 25000128 H RX IP 250 OP 636: Performed by: ANESTHESIOLOGY

## 2020-03-10 PROCEDURE — 64999 UNLISTED PX NERVOUS SYSTEM: CPT

## 2020-03-10 PROCEDURE — 25000132 ZZH RX MED GY IP 250 OP 250 PS 637: Mod: GY | Performed by: NURSE PRACTITIONER

## 2020-03-10 RX ORDER — PROPOFOL 10 MG/ML
INJECTION, EMULSION INTRAVENOUS PRN
Status: DISCONTINUED | OUTPATIENT
Start: 2020-03-10 | End: 2020-03-10

## 2020-03-10 RX ORDER — LIDOCAINE HYDROCHLORIDE 20 MG/ML
INJECTION, SOLUTION INFILTRATION; PERINEURAL PRN
Status: DISCONTINUED | OUTPATIENT
Start: 2020-03-10 | End: 2020-03-10

## 2020-03-10 RX ORDER — SODIUM CHLORIDE, SODIUM LACTATE, POTASSIUM CHLORIDE, CALCIUM CHLORIDE 600; 310; 30; 20 MG/100ML; MG/100ML; MG/100ML; MG/100ML
INJECTION, SOLUTION INTRAVENOUS CONTINUOUS PRN
Status: DISCONTINUED | OUTPATIENT
Start: 2020-03-10 | End: 2020-03-10

## 2020-03-10 RX ORDER — FENTANYL CITRATE 50 UG/ML
25-50 INJECTION, SOLUTION INTRAMUSCULAR; INTRAVENOUS
Status: DISCONTINUED | OUTPATIENT
Start: 2020-03-10 | End: 2020-03-10 | Stop reason: HOSPADM

## 2020-03-10 RX ORDER — CEFAZOLIN SODIUM 1 G/3ML
1 INJECTION, POWDER, FOR SOLUTION INTRAMUSCULAR; INTRAVENOUS SEE ADMIN INSTRUCTIONS
Status: DISCONTINUED | OUTPATIENT
Start: 2020-03-10 | End: 2020-03-10

## 2020-03-10 RX ORDER — NALOXONE HYDROCHLORIDE 0.4 MG/ML
.1-.4 INJECTION, SOLUTION INTRAMUSCULAR; INTRAVENOUS; SUBCUTANEOUS
Status: DISCONTINUED | OUTPATIENT
Start: 2020-03-10 | End: 2020-03-11 | Stop reason: HOSPADM

## 2020-03-10 RX ORDER — ACETAMINOPHEN 325 MG/1
975 TABLET ORAL ONCE
Status: DISCONTINUED | OUTPATIENT
Start: 2020-03-10 | End: 2020-03-10 | Stop reason: HOSPADM

## 2020-03-10 RX ORDER — SODIUM CHLORIDE, SODIUM LACTATE, POTASSIUM CHLORIDE, CALCIUM CHLORIDE 600; 310; 30; 20 MG/100ML; MG/100ML; MG/100ML; MG/100ML
INJECTION, SOLUTION INTRAVENOUS CONTINUOUS
Status: DISCONTINUED | OUTPATIENT
Start: 2020-03-10 | End: 2020-03-10 | Stop reason: HOSPADM

## 2020-03-10 RX ORDER — HYDROMORPHONE HYDROCHLORIDE 1 MG/ML
0.5 INJECTION, SOLUTION INTRAMUSCULAR; INTRAVENOUS; SUBCUTANEOUS ONCE
Status: COMPLETED | OUTPATIENT
Start: 2020-03-10 | End: 2020-03-10

## 2020-03-10 RX ORDER — HYDRALAZINE HYDROCHLORIDE 20 MG/ML
2.5-5 INJECTION INTRAMUSCULAR; INTRAVENOUS EVERY 10 MIN PRN
Status: DISCONTINUED | OUTPATIENT
Start: 2020-03-10 | End: 2020-03-10 | Stop reason: HOSPADM

## 2020-03-10 RX ORDER — GABAPENTIN 300 MG/1
300 CAPSULE ORAL 3 TIMES DAILY
Status: DISCONTINUED | OUTPATIENT
Start: 2020-03-10 | End: 2020-03-11 | Stop reason: HOSPADM

## 2020-03-10 RX ORDER — LIDOCAINE 40 MG/G
CREAM TOPICAL
Status: DISCONTINUED | OUTPATIENT
Start: 2020-03-10 | End: 2020-03-11 | Stop reason: HOSPADM

## 2020-03-10 RX ORDER — ONDANSETRON 4 MG/1
4 TABLET, ORALLY DISINTEGRATING ORAL EVERY 30 MIN PRN
Status: DISCONTINUED | OUTPATIENT
Start: 2020-03-10 | End: 2020-03-10 | Stop reason: HOSPADM

## 2020-03-10 RX ORDER — HYDROMORPHONE HYDROCHLORIDE 1 MG/ML
.3-.5 INJECTION, SOLUTION INTRAMUSCULAR; INTRAVENOUS; SUBCUTANEOUS EVERY 5 MIN PRN
Status: DISCONTINUED | OUTPATIENT
Start: 2020-03-10 | End: 2020-03-10 | Stop reason: HOSPADM

## 2020-03-10 RX ORDER — CEFAZOLIN SODIUM 2 G/100ML
2 INJECTION, SOLUTION INTRAVENOUS
Status: COMPLETED | OUTPATIENT
Start: 2020-03-10 | End: 2020-03-10

## 2020-03-10 RX ORDER — SODIUM CHLORIDE, SODIUM LACTATE, POTASSIUM CHLORIDE, CALCIUM CHLORIDE 600; 310; 30; 20 MG/100ML; MG/100ML; MG/100ML; MG/100ML
INJECTION, SOLUTION INTRAVENOUS CONTINUOUS
Status: DISCONTINUED | OUTPATIENT
Start: 2020-03-10 | End: 2020-03-11 | Stop reason: HOSPADM

## 2020-03-10 RX ORDER — ONDANSETRON 2 MG/ML
4 INJECTION INTRAMUSCULAR; INTRAVENOUS EVERY 30 MIN PRN
Status: DISCONTINUED | OUTPATIENT
Start: 2020-03-10 | End: 2020-03-10 | Stop reason: HOSPADM

## 2020-03-10 RX ADMIN — MELATONIN 1000 UNITS: at 08:07

## 2020-03-10 RX ADMIN — SODIUM CHLORIDE: 9 INJECTION, SOLUTION INTRAVENOUS at 20:01

## 2020-03-10 RX ADMIN — ACETAMINOPHEN 975 MG: 325 TABLET, FILM COATED ORAL at 04:27

## 2020-03-10 RX ADMIN — PHENYLEPHRINE HYDROCHLORIDE 100 MCG: 10 INJECTION INTRAVENOUS at 19:07

## 2020-03-10 RX ADMIN — HYDROMORPHONE HYDROCHLORIDE 0.3 MG: 1 INJECTION, SOLUTION INTRAMUSCULAR; INTRAVENOUS; SUBCUTANEOUS at 20:04

## 2020-03-10 RX ADMIN — Medication 2 G: at 18:53

## 2020-03-10 RX ADMIN — PHENYTOIN SODIUM 100 MG: 100 CAPSULE ORAL at 08:07

## 2020-03-10 RX ADMIN — SODIUM CHLORIDE, POTASSIUM CHLORIDE, SODIUM LACTATE AND CALCIUM CHLORIDE: 600; 310; 30; 20 INJECTION, SOLUTION INTRAVENOUS at 18:38

## 2020-03-10 RX ADMIN — ACETAMINOPHEN 975 MG: 325 TABLET, FILM COATED ORAL at 22:17

## 2020-03-10 RX ADMIN — HYDROMORPHONE HYDROCHLORIDE 0.5 MG: 1 INJECTION, SOLUTION INTRAMUSCULAR; INTRAVENOUS; SUBCUTANEOUS at 04:27

## 2020-03-10 RX ADMIN — PROPOFOL 140 MG: 10 INJECTION, EMULSION INTRAVENOUS at 18:51

## 2020-03-10 RX ADMIN — METHYLCELLULOSE 500 MG: 500 TABLET ORAL at 08:07

## 2020-03-10 RX ADMIN — HYDROMORPHONE HYDROCHLORIDE 0.5 MG: 1 INJECTION, SOLUTION INTRAMUSCULAR; INTRAVENOUS; SUBCUTANEOUS at 15:32

## 2020-03-10 RX ADMIN — GABAPENTIN 300 MG: 300 CAPSULE ORAL at 08:07

## 2020-03-10 RX ADMIN — PHENYLEPHRINE HYDROCHLORIDE 100 MCG: 10 INJECTION INTRAVENOUS at 18:57

## 2020-03-10 RX ADMIN — HYDROMORPHONE HYDROCHLORIDE 0.5 MG: 1 INJECTION, SOLUTION INTRAMUSCULAR; INTRAVENOUS; SUBCUTANEOUS at 19:12

## 2020-03-10 RX ADMIN — SODIUM CHLORIDE 300 MG PE: 9 INJECTION, SOLUTION INTRAVENOUS at 13:35

## 2020-03-10 RX ADMIN — HYDROMORPHONE HYDROCHLORIDE 0.4 MG: 1 INJECTION, SOLUTION INTRAMUSCULAR; INTRAVENOUS; SUBCUTANEOUS at 20:18

## 2020-03-10 RX ADMIN — HYDROMORPHONE HYDROCHLORIDE 0.5 MG: 1 INJECTION, SOLUTION INTRAMUSCULAR; INTRAVENOUS; SUBCUTANEOUS at 12:42

## 2020-03-10 RX ADMIN — HYDROMORPHONE HYDROCHLORIDE 0.5 MG: 1 INJECTION, SOLUTION INTRAMUSCULAR; INTRAVENOUS; SUBCUTANEOUS at 08:01

## 2020-03-10 RX ADMIN — LISINOPRIL 20 MG: 20 TABLET ORAL at 08:07

## 2020-03-10 RX ADMIN — ROCURONIUM BROMIDE 65 MG: 10 INJECTION INTRAVENOUS at 18:51

## 2020-03-10 RX ADMIN — SUGAMMADEX 200 MG: 100 INJECTION, SOLUTION INTRAVENOUS at 19:23

## 2020-03-10 RX ADMIN — GABAPENTIN 300 MG: 300 CAPSULE ORAL at 13:51

## 2020-03-10 RX ADMIN — OXYCODONE HYDROCHLORIDE 5 MG: 5 TABLET ORAL at 08:41

## 2020-03-10 RX ADMIN — LIDOCAINE HYDROCHLORIDE 100 MG: 20 INJECTION, SOLUTION INFILTRATION; PERINEURAL at 18:51

## 2020-03-10 RX ADMIN — HYDROMORPHONE HYDROCHLORIDE 0.5 MG: 1 INJECTION, SOLUTION INTRAMUSCULAR; INTRAVENOUS; SUBCUTANEOUS at 11:49

## 2020-03-10 ASSESSMENT — VISUAL ACUITY
OU: BASELINE;NORMAL ACUITY
OU: BASELINE;NORMAL ACUITY
OU: BLURRED VISION
OU: BASELINE;NORMAL ACUITY
OU: BASELINE;NORMAL ACUITY
OU: BASELINE

## 2020-03-10 ASSESSMENT — ACTIVITIES OF DAILY LIVING (ADL)
ADLS_ACUITY_SCORE: 11
ADLS_ACUITY_SCORE: 11
ADLS_ACUITY_SCORE: 13
ADLS_ACUITY_SCORE: 11
ADLS_ACUITY_SCORE: 11

## 2020-03-10 NOTE — PLAN OF CARE
Status: Patient is POD 1 s/p left radiofrequency rhizotomy.  Vitals: VSS, except HTN within parameters.   Neuros: A&Ox4. Neuros include left facial numbness & pain, and dizziness.   IV: PIV @ 50 mL/hr.  Resp/trach: No issues.  Diet: NPO for surgery since 1130 this morning.  Bowel status: No BM this shift.  : Voiding spontaneously.  Skin: Left facial incision, very small. NIELS. Intact.  Pain: Intolerable throughout day. Given IV fosphenytoin which helped, otherwise treating with IV dilaudid and oxycodone.   Activity: Up SBA, unsteady d/t dizziness.  Social: Family at bedside, supportive.  Plan: Patient down to surgery at 1800. Will continue to monitor.

## 2020-03-10 NOTE — PLAN OF CARE
Status: Patient transferred from IR at 2015 s/p left Radiofrequency rhizotomy   Vitals: VSS, capnography in place until 0430 EtCo2 32-38, IPI 9-10  Neuros: A&Ox4, intact ex left facial numbness  IV: PIV infusing NS at 50mL/hr  Resp/trach: Lung sounds clear throughout  Diet: Clears ADAT  Bowel status: No BM overnight, BS+  : Voiding spontanoeusly  Skin: Intact  Pain: Patient c/o left facial pain relieved with IV Dilaudid 2x and Tylenol  Activity: Up with SBA  Social: Calm and cooperative with cares, slept between cares  Plan: Discharge home, possible today, continue to monitor and follow POC

## 2020-03-10 NOTE — BRIEF OP NOTE
[facility], [place]    Brief Operative Note    Pre-operative diagnosis: Trigeminal anesthesia [G50.8]  Post-operative diagnosis Same as pre-operative diagnosis    Procedure: Procedure(s):  left Radiofrequency rhizotomy  Surgeon: Surgeon(s) and Role:     * GENERIC ANESTHESIA PROVIDER - Primary  Anesthesia: Monitor Anesthesia Care   Estimated blood loss: Minimal  Drains: None  Specimens: * No specimens in log *  Findings:   None.  Complications: None.  Implants: * No implants in log *        On behalf of Dr. Pierre Brief OP Note placed.

## 2020-03-10 NOTE — ANESTHESIA POSTPROCEDURE EVALUATION
Anesthesia POST Procedure Evaluation    Patient: Sheila Barraza   MRN:     [id number] Gender:   female   Age:    69 year old :      1950        Preoperative Diagnosis: Trigeminal anesthesia [G50.8]   Procedure(s):  left Radiofrequency rhizotomy   Postop Comments: No value filed.     Anesthesia Type: MAC          Postop Pain Control: Uneventful            Sign Out: Well controlled pain   PONV: No   Neuro/Psych: Uneventful            Sign Out: Acceptable/Baseline neuro status   Airway/Respiratory: Uneventful            Sign Out: Acceptable/Baseline resp. status   CV/Hemodynamics: Uneventful            Sign Out: Acceptable CV status   Other NRE: NONE   DID A NON-ROUTINE EVENT OCCUR? No         Last Anesthesia Record Vitals:  CRNA VITALS  3/9/2020 1924 - 3/9/2020 2024      3/9/2020             EKG:  Sinus rhythm          Last PACU Vitals:  No vitals data found for the desired time range.        Electronically Signed By: Sarah Wachter, MD, 2020, 9:29 PM

## 2020-03-10 NOTE — PLAN OF CARE
Cared for pt between [id number]  Status: admitted for trigeminal neuralgia  Vitals: VSS  Neuros: L facial pain, numbness, photophobia and blurred vision  IV: PIV saline locked  Resp/trach: WNL  Diet: NPO, pt not eating much last 3 days per family  Bowel status: passing flatus, No BM during shift  : voiding without difficulty  Skin: intact  Pain: poorly managed, pt in writhing, screaming pain for 3-4 min x 2 since 1400  Activity: up with SBA  Social: family at bedside, supportive, updated  Plan: discharge to daughter's home when medically ready

## 2020-03-10 NOTE — OP NOTE
Procedure Date: 03/09/2020      SURGEON:  Jarek Pierre MD      :  None.      PREOPERATIVE DIAGNOSIS:  Left-sided V2 trigeminal neuralgia.      POSTOPERATIVE DIAGNOSIS:  Left-sided V2 trigeminal neuralgia.      PROCEDURES PERFORMED:  Left side percutaneous stereotactic radiofrequency rhizotomy.      ANESTHESIA:  Local MAC.      ESTIMATED BLOOD LOSS:  Less than 1 mL.      COMPLICATIONS:  None immediately apparent.      PREOPERATIVE HISTORY:  Sheila is a 69-year-old woman who has a history of a brainstem cavernoma and a developmental venous anomaly.  The developmental venous anomaly is compressing the trigeminal nerve, and as a consequence, she has trigeminal neuralgia on the left side.  Over the last several weeks, this became refractory to the point where today she presented to the ER and was absolutely screaming in pain.  We added her onto the operative schedule and planned a percutaneous radiofrequency rhizotomy.  She understood the risks and benefits including expected numbness and consented to this.      DESCRIPTION OF PROCEDURE:  She was brought down to the angiography suite and positioned in a supine fashion on the angiography table.  Hartel's landmarks were marked on the left side of the face, which was then prepped and draped in the usual sterile fashion.      After a timeout, we then percutaneously placed a needle through the foramen ovale and then removed the inner stylet and replaced this with a Juan Daniel electrode.  This was all done under fluoroscopic guidance.  We then allowed her to wake up.  We were able to stimulate some of her pain at about 0.4.  We then put her to sleep with Brevital, and I made a single lesion at 70 degrees for 90 seconds.  She then awoke, and I tested pinprick sensation, which was slightly diminished on the left side.  I therefore readjusted my needle, turning it slightly more lateral.  I was able to reproduce pain at about 0.2.  I made a second lesion for 90  seconds at 70 degrees.  After this, I decided to stop the procedure.  I then removed our Juan Daniel electrode and our needle and then held pressure on the cheek for 4 minutes.      I attest that I was present for the entire duration of the procedure.         DAVID CAMPBELL MD             D: 2020   T: 03/10/2020   MT: ESPINOZA      Name:     TANIKA RIVAS   MRN:      [id number]        Account:        [id number]   :      1950           Procedure Date: 2020      Document: [id number]

## 2020-03-10 NOTE — PROGRESS NOTES
"Neurosurgery Progress Note    Subjective:  admitted to 6A, left radiofrequency rhizotomy, some trigeminal pain post-op, added dilaudid, increased gabapentin    Objective:  /71   Pulse 81   Temp 97  F (36.1  C) (Axillary)   Resp 18   Ht 1.651 m (5' 5\")   Wt 59.4 kg (131 lb)   LMP  (LMP Unknown)   SpO2 97%   BMI 21.80 kg/m    Alert, oriented x3  CN 2-12 intact, except baseline numbness in left V1-V3 distributions  5/5 strength in all extremities, no pronator drift  Sensation intact to light touch, except except baseline numbness to right hand      Assessment:  Ms. 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 Prabhakar presenting with recurrent trigeminal pain. S/p left radiofrequency rhizotomy 3/9.    Plan:  - pain control: increased gabapentin, added dilaudid  - will monitor and hold off on further intervention to give adequate time to assess for effectiveness of recent procedure  - advance diet as tolerated  - SBP < 140  - Dispo: 6A, home pending pain control    Galileo oRque MD  Neurosurgery Resident PGY2    Please contact neurosurgery resident on call with questions.    Dial * * [phone], enter [phone] when prompted.       "

## 2020-03-10 NOTE — ANESTHESIA CARE TRANSFER NOTE
Patient: Sheila Barraza    Procedure(s):  left Radiofrequency rhizotomy    Diagnosis: Trigeminal anesthesia [G50.8]  Diagnosis Additional Information: No value filed.    Anesthesia Type:   MAC     Note:  Airway :Room Air  Patient transferred to:Phase II  Comments: Report called to 6A Rn.  Pt stable on room air.  Denies pain and nausea. Handoff Report: Identifed the Patient, Identified the Reponsible Provider, Reviewed the pertinent medical history, Discussed the surgical course, Reviewed Intra-OP anesthesia mangement and issues during anesthesia, Set expectations for post-procedure period and Allowed opportunity for questions and acknowledgement of understanding      Vitals: (Last set prior to Anesthesia Care Transfer)    CRNA VITALS  3/9/2020 1924 - 3/9/2020 2004      3/9/2020             EKG:  Sinus rhythm                Electronically Signed By: ANABELLE Lindo CRNA  March 9, 2020  8:04 PM

## 2020-03-10 NOTE — ANESTHESIA PREPROCEDURE EVALUATION
Anesthesia Pre-Procedure Evaluation    Patient: Sheila Barraza   MRN:     [id number] Gender:   female   Age:    69 year old :      1950        Preoperative Diagnosis: Trigeminal neuralgia [G50.0]   Procedure(s):  Left Side Percutaneous Balloon Rhizotomy     LABS:  CBC:   Lab Results   Component Value Date    WBC 8.6 03/10/2020    WBC 9.2 2020    HGB 12.7 03/10/2020    HGB 13.4 2020    HCT 37.2 03/10/2020    HCT 40.1 2020     03/10/2020     2020     BMP:   Lab Results   Component Value Date     03/10/2020     2020    POTASSIUM 4.1 03/10/2020    POTASSIUM 4.4 2020    CHLORIDE 105 03/10/2020    CHLORIDE 105 2020    CO2 27 03/10/2020    CO2 27 2020    BUN 10 03/10/2020    BUN 16 2020    CR 0.76 03/10/2020    CR 0.84 2020     (H) 03/10/2020     (H) 2020     COAGS:   Lab Results   Component Value Date    PTT 28 2020    INR 1.00 2020    FIBR 478 (H) 2020     POC:   Lab Results   Component Value Date     (H) 03/10/2020     OTHER:   Lab Results   Component Value Date    PH 7.38 2020    LACT 2.5 (H) 2020    A1C 6.0 (H) 01/15/2020    MICHAEL 9.2 03/10/2020    PHOS 3.7 03/10/2020    MAG 2.1 03/10/2020    ALBUMIN 3.7 2020    PROTTOTAL 7.1 2020    ALT 16 2020    AST 7 2020    ALKPHOS 88 2020    BILITOTAL 0.4 2020    TSH 0.82 2020    T4 6.5 2020        Preop Vitals    BP Readings from Last 3 Encounters:   03/10/20 (!) 148/84   20 117/60   20 109/55    Pulse Readings from Last 3 Encounters:   03/10/20 99   20 101   20 66      Resp Readings from Last 3 Encounters:   03/10/20 16   20 20   20 16    SpO2 Readings from Last 3 Encounters:   03/10/20 95%   20 98%   20 98%      Temp Readings from Last 1 Encounters:   03/10/20 36.2  C (97.1  F) (Axillary)    Ht Readings from Last 1 Encounters:  "  03/09/20 1.651 m (5' 5\")      Wt Readings from Last 1 Encounters:   03/09/20 59.4 kg (131 lb)    Estimated body mass index is 21.8 kg/m  as calculated from the following:    Height as of this encounter: 1.651 m (5' 5\").    Weight as of this encounter: 59.4 kg (131 lb).     LDA:  Peripheral IV 03/09/20 Right;Anterior Upper forearm (Active)   Site Assessment WDL 03/10/20 1808   Line Status Saline locked 03/10/20 1808   Phlebitis Scale 0-->no symptoms 03/10/20 1808   Infiltration Scale 0 03/10/20 1808   Number of days: 1       ETT (Active)   Number of days: 0        Past Medical History:   Diagnosis Date     Dyslipidemia      Hypertension       Past Surgical History:   Procedure Laterality Date     CRANIOTOMY, SUBOCCIPITAL N/A 1/22/2020    Procedure: STEALTH GUIDED SUBOCCIPITAL CRANIOTOMY FOR RESECTION OF CAVERNOUS MALFORMATION WITH NEUROMONITORING;  Surgeon: Catarina Prabhakar MD;  Location: [facility] OR     HERNIA REPAIR        Allergies   Allergen Reactions     Beta Adrenergic Blockers      Lasix [Furosemide]      Nuts      Phenol Other (See Comments)     Sulfa Drugs      Tegretol [Carbamazepine]         Anesthesia Evaluation     . Pt has had prior anesthetic.            ROS/MED HX    ENT/Pulmonary:     (+)asthma , . .    Neurologic:       Cardiovascular:         METS/Exercise Tolerance:     Hematologic:         Musculoskeletal:         GI/Hepatic:         Renal/Genitourinary:         Endo:         Psychiatric:         Infectious Disease:         Malignancy:         Other:                         PHYSICAL EXAM:   Mental Status/Neuro: A/A/O   Airway: Facies: Micrognathia  Mallampati: II  TM distance: > 6 cm  Neck ROM: Full   Respiratory: Auscultation: CTAB     Resp. Rate: Normal     Resp. Effort: Normal      CV: Rhythm: Regular  Rate: Age appropriate  Heart: Normal Sounds  Edema: None   Comments:      Dental: Normal Dentition                Assessment:   ASA SCORE: 2    H&P: History and physical reviewed and " following examination; no interval change.   Smoking Status:  Non-Smoker/Unknown   NPO Status: NPO Appropriate     Plan:   Anes. Type:  General   Pre-Medication: None   Induction:  IV (Standard)   Airway: ETT; Oral   Access/Monitoring: PIV   Maintenance: Balanced     Postop Plan:   Postop Pain: Opioids  Postop Sedation/Airway: Not planned  Disposition: Inpatient/Admit     PONV Management:   Adult Risk Factors: Female, Non-Smoker, Postop Opioids   Prevention: Ondansetron, Dexamethasone     CONSENT: Direct conversation   Plan and risks discussed with: Patient   Blood Products: Consent Deferred (Minimal Blood Loss)                   Russ Sharp MD

## 2020-03-11 ENCOUNTER — PATIENT OUTREACH (OUTPATIENT)
Dept: CARE COORDINATION | Facility: CLINIC | Age: 70
End: 2020-03-11

## 2020-03-11 VITALS
TEMPERATURE: 97.2 F | BODY MASS INDEX: 21.83 KG/M2 | WEIGHT: 131 LBS | HEIGHT: 65 IN | DIASTOLIC BLOOD PRESSURE: 77 MMHG | HEART RATE: 86 BPM | SYSTOLIC BLOOD PRESSURE: 136 MMHG | OXYGEN SATURATION: 99 % | RESPIRATION RATE: 16 BRPM

## 2020-03-11 LAB
ANION GAP SERPL CALCULATED.3IONS-SCNC: 5 MMOL/L (ref 3–14)
BASOPHILS # BLD AUTO: 0.1 10E9/L (ref 0–0.2)
BASOPHILS NFR BLD AUTO: 0.7 %
BUN SERPL-MCNC: 10 MG/DL (ref 7–30)
CALCIUM SERPL-MCNC: 9.1 MG/DL (ref 8.5–10.1)
CHLORIDE SERPL-SCNC: 108 MMOL/L (ref 94–109)
CO2 SERPL-SCNC: 29 MMOL/L (ref 20–32)
CREAT SERPL-MCNC: 0.79 MG/DL (ref 0.52–1.04)
DIFFERENTIAL METHOD BLD: ABNORMAL
EOSINOPHIL # BLD AUTO: 0.1 10E9/L (ref 0–0.7)
EOSINOPHIL NFR BLD AUTO: 1.2 %
ERYTHROCYTE [DISTWIDTH] IN BLOOD BY AUTOMATED COUNT: 12.8 % (ref 10–15)
GFR SERPL CREATININE-BSD FRML MDRD: 75 ML/MIN/{1.73_M2}
GLUCOSE BLDC GLUCOMTR-MCNC: 84 MG/DL (ref 70–99)
GLUCOSE SERPL-MCNC: 91 MG/DL (ref 70–99)
HCT VFR BLD AUTO: 37.6 % (ref 35–47)
HGB BLD-MCNC: 12.7 G/DL (ref 11.7–15.7)
IMM GRANULOCYTES # BLD: 0 10E9/L (ref 0–0.4)
IMM GRANULOCYTES NFR BLD: 0.5 %
LYMPHOCYTES # BLD AUTO: 1.5 10E9/L (ref 0.8–5.3)
LYMPHOCYTES NFR BLD AUTO: 19.8 %
MAGNESIUM SERPL-MCNC: 2.1 MG/DL (ref 1.6–2.3)
MCH RBC QN AUTO: 33.9 PG (ref 26.5–33)
MCHC RBC AUTO-ENTMCNC: 33.8 G/DL (ref 31.5–36.5)
MCV RBC AUTO: 100 FL (ref 78–100)
MONOCYTES # BLD AUTO: 0.6 10E9/L (ref 0–1.3)
MONOCYTES NFR BLD AUTO: 7.9 %
NEUTROPHILS # BLD AUTO: 5.4 10E9/L (ref 1.6–8.3)
NEUTROPHILS NFR BLD AUTO: 69.9 %
NRBC # BLD AUTO: 0 10*3/UL
NRBC BLD AUTO-RTO: 0 /100
PHOSPHATE SERPL-MCNC: 3.4 MG/DL (ref 2.5–4.5)
PLATELET # BLD AUTO: 230 10E9/L (ref 150–450)
POTASSIUM SERPL-SCNC: 4.1 MMOL/L (ref 3.4–5.3)
RBC # BLD AUTO: 3.75 10E12/L (ref 3.8–5.2)
SODIUM SERPL-SCNC: 141 MMOL/L (ref 133–144)
WBC # BLD AUTO: 7.7 10E9/L (ref 4–11)

## 2020-03-11 PROCEDURE — 85025 COMPLETE CBC W/AUTO DIFF WBC: CPT | Performed by: STUDENT IN AN ORGANIZED HEALTH CARE EDUCATION/TRAINING PROGRAM

## 2020-03-11 PROCEDURE — 25000132 ZZH RX MED GY IP 250 OP 250 PS 637: Mod: GY | Performed by: STUDENT IN AN ORGANIZED HEALTH CARE EDUCATION/TRAINING PROGRAM

## 2020-03-11 PROCEDURE — 00000146 ZZHCL STATISTIC GLUCOSE BY METER IP

## 2020-03-11 PROCEDURE — 80048 BASIC METABOLIC PNL TOTAL CA: CPT | Performed by: STUDENT IN AN ORGANIZED HEALTH CARE EDUCATION/TRAINING PROGRAM

## 2020-03-11 PROCEDURE — 83735 ASSAY OF MAGNESIUM: CPT | Performed by: STUDENT IN AN ORGANIZED HEALTH CARE EDUCATION/TRAINING PROGRAM

## 2020-03-11 PROCEDURE — 84100 ASSAY OF PHOSPHORUS: CPT | Performed by: STUDENT IN AN ORGANIZED HEALTH CARE EDUCATION/TRAINING PROGRAM

## 2020-03-11 PROCEDURE — 36415 COLL VENOUS BLD VENIPUNCTURE: CPT | Performed by: STUDENT IN AN ORGANIZED HEALTH CARE EDUCATION/TRAINING PROGRAM

## 2020-03-11 RX ORDER — GABAPENTIN 100 MG/1
100 CAPSULE ORAL 2 TIMES DAILY
Qty: 60 CAPSULE | Refills: 1 | COMMUNITY
Start: 2020-03-11

## 2020-03-11 RX ADMIN — ACETAMINOPHEN 650 MG: 325 TABLET, FILM COATED ORAL at 08:44

## 2020-03-11 RX ADMIN — PHENYTOIN SODIUM 100 MG: 100 CAPSULE ORAL at 08:01

## 2020-03-11 RX ADMIN — LISINOPRIL 20 MG: 20 TABLET ORAL at 08:01

## 2020-03-11 RX ADMIN — GABAPENTIN 300 MG: 300 CAPSULE ORAL at 08:01

## 2020-03-11 RX ADMIN — MELATONIN 1000 UNITS: at 08:01

## 2020-03-11 RX ADMIN — METHYLCELLULOSE 500 MG: 500 TABLET ORAL at 08:01

## 2020-03-11 ASSESSMENT — ACTIVITIES OF DAILY LIVING (ADL)
ADLS_ACUITY_SCORE: 13

## 2020-03-11 NOTE — PLAN OF CARE
Pt discharged home with  and daughter. Went over discharge paperwork with pt and family; all questions answered. Pt left room via wheelchair with all belongings.

## 2020-03-11 NOTE — ANESTHESIA CARE TRANSFER NOTE
Patient: Sheila Barraza    Procedure(s):  Left Side Percutaneous Balloon Rhizotomy    Diagnosis: Trigeminal neuralgia [G50.0]  Diagnosis Additional Information: No value filed.    Anesthesia Type:   No value filed.     Note:  Airway :Face Mask  Patient transferred to:PACU  Comments: Anesthesia Care Transfer Note    Patient: Sheila Barraza    Transferred to: PACU    Patient vital signs: stable    Airway: none    Monitors on, VSS, pt. Stable, Report given to PACU RN.     Justin Martel CRNA  3/10/2020 7:39 PM      Handoff Report: Identifed the Patient, Identified the Reponsible Provider, Reviewed the pertinent medical history, Discussed the surgical course, Reviewed Intra-OP anesthesia mangement and issues during anesthesia, Set expectations for post-procedure period and Allowed opportunity for questions and acknowledgement of understanding      Vitals: (Last set prior to Anesthesia Care Transfer)    CRNA VITALS  3/10/2020 1908 - 3/10/2020 1938      3/10/2020             Pulse:  97    SpO2:  100 %    Resp Rate (observed):  (!) 3                Electronically Signed By: ANABELLE Taylor CRNA  March 10, 2020  7:38 PM

## 2020-03-11 NOTE — PLAN OF CARE
Status: POD# 1 s/p Left Side Percutaneous Balloon Rhizotomy  Vitals: VSS, CPAP at night  Neuros: A&Ox4, intact ex left facial numbness  IV: PIV infusing NS at 50mL/hr  Resp/trach: Lung sounds clear throughout  Diet: Clears ADAT  Bowel status: No BM overnight, BS+  : Voiding spontanoeusly  Skin: Intact  Pain: Denies  Activity: Up with SBA  Social: Calm and cooperative with cares, slept between cares  Plan: Discharge home, possible today, continue to monitor and follow POC

## 2020-03-11 NOTE — PROGRESS NOTES
"Neurosurgery Progress Note    Subjective: ongoing left trigeminal neuralgia, had balloon rhizotomy, pain resolved after balloon rhizotomy      Objective:  /77   Pulse 86   Temp 97.2  F (36.2  C) (Oral)   Resp 16   Ht 1.651 m (5' 5\")   Wt 59.4 kg (131 lb)   LMP  (LMP Unknown)   SpO2 99%   BMI 21.80 kg/m    Alert, oriented x3  CN 2-12 intact, except baseline numbness in left V1-V3 distributions  5/5 strength in all extremities, no pronator drift  Sensation intact to light touch, except except baseline numbness to right hand      Assessment:  Ms. 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 Prabhakar presenting with recurrent trigeminal pain. S/p left radiofrequency rhizotomy 3/9. Persistent trigeminal neuralgia, s/p left balloon rhizotomy 3/10.    Plan:  - pain control: increased gabapentin, added dilaudid  - advance diet as tolerated  - SBP < 140  - Dispo: 6A, plan to discharge home 3/11    Galileo Roque MD  Neurosurgery Resident PGY2    Please contact neurosurgery resident on call with questions.    Dial * * [phone], enter [phone] when prompted.       "

## 2020-03-11 NOTE — IR NOTE
Patient Name: Sheila Barraza  Medical Record Number: [id number]  Today's Date: 3/10/2020    Procedure: balloon rhizotomy, left side  Proceduralist: Jarek Pierre MD    Procedure Start: 1910  Procedure end:   Sedation medications administered: General anesthesia administered by CRNA      Report given to: PACU RN by CRNA  : n/a    Other Notes: Pt arrived to IR room 3 from PC. Consent reviewed. Pt denies any questions or concerns regarding procedure. Pt positioned supine and monitored per protocol. Pt tolerated procedure without any noted complications. Pt transferred back to PACU for recovery.

## 2020-03-11 NOTE — PLAN OF CARE
"[id number]    /74 (BP Location: Right arm)   Pulse 86   Temp 96.1  F (35.6  C) (Oral)   Resp 16   Ht 1.651 m (5' 5\")   Wt 59.4 kg (131 lb)   LMP  (LMP Unknown)   SpO2 97%   BMI 21.80 kg/m      Reason for admission: Trigeminal neuralgia. POD0 left side percutaneous balloon rhizotomy.   Activity: SBA, GB  Pain: Denies facial pain. Given Tylenol for mild headache.  Neuro: AxOx4. Left sided facial numbness. Otherwise neuros intact.   Cardiac: WDL  Respiratory: Non labored breathing on RA. LS clear.  GI/: Voiding spontaneously. Abdomen soft, nt, nd. +BS.   Diet: Clear liquid diet.   Lines: PIV intact, infusing MIVF. Site WDL.   Wounds: Skin intact    Continue to monitor and follow POC    "

## 2020-03-11 NOTE — OP NOTE
Procedure Date: 03/10/2020      PREOPERATIVE DIAGNOSIS:  Left trigeminal neuralgia.      POSTOPERATIVE DIAGNOSIS:  Left trigeminal neuralgia.      PROCEDURE PERFORMED:  Left percutaneous balloon rhizotomy.      SURGEON:  Jarek Pierre MD      :  Shahab Rueda MD      BLOOD LOSS:  Minimal.      INDICATIONS:  Sheila Barraza is a 69-year-old female with a history of left-sided trigeminal neuralgia, status post craniotomy for cavernoma resection.  She re-presented with worsening left-sided trigeminal neuralgia for more than 1 week.  She underwent a left-sided radiofrequency ablation on 03/09/2020 which did not achieve the expected relief of pain.  Therefore, the decision was made to take her back to the operating room and perform a balloon rhizotomy.  The patient agreed to the plan and elected the procedure.      DESCRIPTION OF PROCEDURE:  The patient was brought to the operative room.  She was placed supine on the operative table.  Generalized anesthesia was induced.  The patient's identity, site of procedure, side of procedure as well as prophylactic antibiotics was confirmed.  After the patient was put to sleep, the entry point on the left cheek was marked about 2 cm from the left angle of the lips.  Initially, a small needle was used to penetrate the skin.  Next, a trocar with a stylet was used to reach the foramen ovale under fluoroscopy.  Next, the stylet was removed and a balloon catheter was introduced and the balloon was placed into the presumed Meckel's cave.  Next contrast agent was infused under fluoroscopy and the balloon was inflated.  An atmospheric pressure of 1.7 was held for about 2.5 minutes.  Afterwards, the balloon was deflated and the trocar including the balloon catheter was removed.  Pressure was applied to the cheek to stop minimal bleeding.      The patient was returned to the care of our colleagues from anesthesia and she was woken up and extubated.  The patient  tolerated the procedure well.  At the end of the procedure, needle count, sponge count, and instruments were complete x 2.         DAVID CAMPBELL MD       As dictated by MICHAEL CARDONA MD            D: 03/10/2020   T: 2020   MT: KENNETH      Name:     TANIKA RIVAS   MRN:      [id number]        Account:        [id number]   :      1950           Procedure Date: 03/10/2020      Document: [id number]

## 2020-03-11 NOTE — BRIEF OP NOTE
[facility], [place]    Brief Operative Note    Pre-operative diagnosis: Trigeminal neuralgia [G50.0]  Post-operative diagnosis Same as pre-operative diagnosis    Procedure: Procedure(s):  Left Side Percutaneous Balloon Rhizotomy  Surgeon: Surgeon(s) and Role:     * Jarek Pierre MD - Primary  Anesthesia: General   Estimated blood loss: Minimal  Drains: None  Specimens: * No specimens in log *  Findings:   None.  Complications: None.  Implants: * No implants in log *

## 2020-03-11 NOTE — ANESTHESIA POSTPROCEDURE EVALUATION
Anesthesia POST Procedure Evaluation    Patient: Sheila Barraza   MRN:     [id number] Gender:   female   Age:    69 year old :      1950        Preoperative Diagnosis: Trigeminal neuralgia [G50.0]   Procedure(s):  Left Side Percutaneous Balloon Rhizotomy   Postop Comments: No value filed.     Anesthesia Type: No value filed.       Disposition: Admission   Postop Pain Control: Uneventful            Sign Out: Well controlled pain   PONV: No   Neuro/Psych: Uneventful            Sign Out: Acceptable/Baseline neuro status   Airway/Respiratory: Uneventful            Sign Out: Acceptable/Baseline resp. status   CV/Hemodynamics: Uneventful            Sign Out: Acceptable CV status   Other NRE: NONE   DID A NON-ROUTINE EVENT OCCUR? No         Last Anesthesia Record Vitals:  CRNA VITALS  3/10/2020 1902 - 3/10/2020 2002      3/10/2020             Pulse:  97    SpO2:  100 %    Resp Rate (observed):  (!) 3          Last PACU Vitals:  Vitals Value Taken Time   /79 3/10/2020  8:20 PM   Temp 36.7  C (98.1  F) 3/10/2020  8:00 PM   Pulse 83 3/10/2020  8:20 PM   Resp 16 3/10/2020  8:00 PM   SpO2 95 % 3/10/2020  8:30 PM   Temp src     NIBP     Pulse     SpO2     Resp     Temp     Ht Rate     Temp 2     Vitals shown include unvalidated device data.      Electronically Signed By: Russ Sharp MD, March 10, 2020, 8:31 PM

## 2020-03-12 ENCOUNTER — TELEPHONE (OUTPATIENT)
Dept: NURSING | Facility: CLINIC | Age: 70
End: 2020-03-12

## 2020-03-12 NOTE — TELEPHONE ENCOUNTER
Outbound call to patient , patient main complaint is the left sided facial numbness, left mouth area.  Explained that is normal after two procedures, should improve in time, but some numbness may remain.    Pt is eating soft foods, drinking a Boost daily.  Recommend eat small frequent soft foods daily with the Boost.  Nutrition is good for her.  Denies all facial pain, remains on Gabapentin 100mg BID.  Patient dizziness remains same as prior to procedure, using walker.  States dizziness is positional, reviewed safety measures for walking around house with walker.  Patient to callback in one week with update on   Dizziness, any facial pain and discussion of tapering Gabapentin.    Voices understanding,  Patient teachback method completed.

## 2020-03-12 NOTE — PROGRESS NOTES
The patient started to list symptoms to me to which I felt were red flag symptoms.  I contacted the nurse triage line and warm transferred patient immediately, stopping my assessment.  Lorraine Pierre CMA Post Discharge Team

## 2020-03-12 NOTE — TELEPHONE ENCOUNTER
[facility]: Nurse Triage Note  SITUATION/BACKGROUND                                                      Sheila Barraza is a 69 year old female   s/ p Left Side Percutaneous Balloon Rhizotomyon 3/10/20 and left Radiofrequency rhizotomy on 3/9/20. Patient calls to report having numbness on left facial area. Not able to feel her nose or mouth on the left and unable to chew on (L) side. In addition, only able to open mouth slightly. Able to consume mostly soups, chocolate malts and water. This nurse noted that trigeminal neuralgia does effect the facial nerve.  No loss of vision, can see out of both eyes equally. Able to focus more when reading by closing one eye. Taking gabapentin as directed: 1 tab in the morning and 1 tab at night for 2 weeks.   Patient had brain surgery in January. She feels that she has experienced more dizziness since these (Left Side Percutaneous Balloon Rhizotomyon 3/10/20 and left Radiofrequency rhizotomy on 3/9/20) procedures. Blood pressure is 107/71 and HR 87. Afebrile. Denies any pain or other S/S at this time.   Patient is using walker to ambulate throughout house and would like to have use of cane. She will begin physical therapy on March 19th. This nurse emphasized the need for safety and fall prevention: use of walker; slow in position changes.   Patient would like a call back from Neurology to inquire facial numbness and dizziness is to be expected after her surgery?   Routed to Neurology as High Priority to review and follow up call.       RECOMMENDATION/PLAN                                                    Routed to Neurology as High Priority to review and follow up call.     RECOMMENDED DISPOSITION: home care instructions given per protocol. Seek ED care with chest pain; decrease in LOC; weakness or difficulty in speaking; heart rate < 50 or > 130 bpm. Will comply with recommendation: Yes    If further questions/concerns or if symptoms do not improve,  worsen or new symptoms develop, call your PCP or [phone] to talk with the Resident on call, as soon as possible.    Guideline used: dizziness  Telephone Triage Protocols for Nurses, Fifth Edition, Wanda Funk, RN, RN

## 2020-03-12 NOTE — DISCHARGE SUMMARY
[facility] Discharge Summary and Instructions    Sheila Barraza MRN# [id number]   Age: 69 year old YOB: 1950     Date of Admission:  3/9/2020  Date of Discharge::  3/11/2020  9:00 AM  Admitting Physician:  Jarek Pierre MD  Discharge Physician:  Jarek Pierre MD          Admission Diagnoses:   Trigeminal neuralgia [G50.0]          Discharge Diagnosis:     Trigeminal neuralgia [G50.0]          Procedures:   3/9/2020 Left radiofrequency rhizotomy  3/10/2020 Left balloon rhizotomy           Brief History of Illness:   Ms. 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 Prabhakar presenting with recurrent trigeminal pain. Patient states her last rhizotomy provided pain relief for about a year, but has been having recurrent pain in the V1-2 distribution for the past few days. The episodes are sensitive to touch such as brushing her teeth, and she has not had much to eat or drink in the past 3 days due to the pain. She went to outside EDs who gave her dilantin and increased her gabapentin. Her pain is still significant so her daughter drove her to [facility] from 500 miles away for management of her trigeminal pain. Scheduled for left rhizotomy 3/17/20 with Dr. Pierre, but the pain is severe now, and feels she is unable to wait, hoping to have the rhizotomy done sooner. In regards to the cavernoma, she is doing well with outpatient therapies, and feels no weakness, or other neurological symptoms, except some numbness in her right hand.           Hospital Course:   Patient underwent above-mentioned procedures. The operation was uncomplicated and she was admitted to the regular floor. However, only minimal pain relief was achieved. Despite ongoing medical pain management, the patient continued to be in severe pain. She therefore underwent a second procedure, which could achieve good pain relief.  "  Subsequently, she was ambulating, voiding without a maldonado, eating a regular diet, pain was well controlled and therefore she was discharged home.    Vital signs:  Temp: 97.2  F (36.2  C) Temp src: Oral BP: 136/77 Pulse: 86 Heart Rate: 77 Resp: 16 SpO2: 99 % O2 Device: None (Room air) Oxygen Delivery: 2 LPM Height: 165.1 cm (5' 5\") Weight: 59.4 kg (131 lb)  Estimated body mass index is 21.8 kg/m  as calculated from the following:    Height as of this encounter: 1.651 m (5' 5\").    Weight as of this encounter: 59.4 kg (131 lb).    Alert and oriented x3  CN II-XII intact, except baseline numbness in left V1-V3 distributions  5/5 strength in all extremities, no pronator drift  Sensation intact to light touch, except except baseline numbness to right hand           Discharge Medications:     Discharge Medication List as of 3/11/2020  8:40 AM      CONTINUE these medications which have CHANGED    Details   gabapentin (NEURONTIN) 100 MG capsule Take 1 capsule (100 mg) by mouth 2 times daily, Disp-60 capsule,R-1, HistoricalOk to reduce dose two weeks after the procedure.         CONTINUE these medications which have NOT CHANGED    Details   acetaminophen (TYLENOL) 325 MG tablet 1-2 tablets (325-650 mg) by Oral or Feeding Tube route every 4 hours as needed for mild pain, fever or headaches, TransitionalIndication: Mild Pain; Headache; Fever      atorvastatin (LIPITOR) 20 MG tablet Take 20 mg by mouth every evening, Historical      calcium carbonate (TUMS) 500 MG chewable tablet Take 2 tablets (1,000 mg) by mouth 3 times daily as needed for heartburn, TransitionalIndication: Heartburn      cinnamon 500 MG TABS Take 1,000 mg by mouth daily, Historical      lisinopril (ZESTRIL) 20 MG tablet Take 20 mg by mouth every morning, Historical      methylcellulose (CITRUCEL) 500 MG TABS tablet Take 1 tablet (500 mg) by mouth daily, HistoricalIndication: Prevention of Constipation      Vitamin D, Cholecalciferol, 25 MCG (1000 UT) TABS " Take 1,000 Units by mouth daily, Historical         STOP taking these medications       pantoprazole (PROTONIX) 40 MG EC tablet Comments:   Reason for Stopping:         phenytoin (DILANTIN) 100 MG capsule Comments:   Reason for Stopping:                       Discharge Instructions and Follow-Up:     Discharge diet: Regular   Discharge activity: You may advance activity as tolerated. No strenuous exercise or heay lifting greater than 10 lbs for 4 weeks or until seen and cleared in clinic.   Discharge follow-up: Follow-up with Dr. Jarek Pierre MD in 2 weeks   Wound care: none     Please call if you have:  1. increased pain, redness, drainage, swelling at your incision  2. fevers > 101.5 F degrees  3. with any questions or concerns.  You may reach the Neurosurgery clinic at [phone] during regular work hours. ER at [phone].    and ask for the Neurosurgery Resident on call at [phone], during off hours or weekends.         Discharge Disposition:     Discharged to home

## 2020-03-16 ENCOUNTER — PRE VISIT (OUTPATIENT)
Dept: SURGERY | Facility: CLINIC | Age: 70
End: 2020-03-16

## 2020-07-20 ENCOUNTER — TELEPHONE (OUTPATIENT)
Dept: NEUROSURGERY | Facility: CLINIC | Age: 70
End: 2020-07-20

## 2020-07-20 NOTE — TELEPHONE ENCOUNTER
Writer faxed the order for the brain MRI to [facility] in [place], [place].      Writer called and left a message for the pt to call the hospital and ask for radiology to set up the MRI.  Hospital number left on the voice mail.

## 2020-08-11 ENCOUNTER — VIRTUAL VISIT (OUTPATIENT)
Dept: NEUROSURGERY | Facility: CLINIC | Age: 70
End: 2020-08-11
Payer: MEDICARE

## 2020-08-11 DIAGNOSIS — Q28.3 CEREBRAL CAVERNOUS MALFORMATION: Primary | ICD-10-CM

## 2020-08-11 ASSESSMENT — PAIN SCALES - GENERAL: PAINLEVEL: MODERATE PAIN (4)

## 2020-08-11 NOTE — PROGRESS NOTES
"Sheila Barraza is a 70 year old female who is being evaluated via a billable telephone visit.      The patient has been notified of following:     \"This telephone visit will be conducted via a call between you and your physician/provider. We have found that certain health care needs can be provided without the need for a physical exam.  This service lets us provide the care you need with a short phone conversation.  If a prescription is necessary we can send it directly to your pharmacy.  If lab work is needed we can place an order for that and you can then stop by our lab to have the test done at a later time.    Telephone visits are billed at different rates depending on your insurance coverage. During this emergency period, for some insurers they may be billed the same as an in-person visit.  Please reach out to your insurance provider with any questions.    If during the course of the call the physician/provider feels a telephone visit is not appropriate, you will not be charged for this service.\"    Patient has given verbal consent for Telephone visit?  YES    What phone number would you like to be contacted at?   [phone]    Phone call duration: 20 minutes    Jose Daniel Ferreira, EMT    See dictated note.    JULIAN Prabhakar MD  " NORMAL     • Experiences normal transition Progressing    • Total weight loss less than 10% of birth weight Progressing

## 2020-08-11 NOTE — LETTER
"2020       RE: Sheila Barraza  [address] [address]     Dear Colleague,    Thank you for referring your patient, Sheila Barraza, to the [facility] NEUROSURGERY at [facility]. Please see a copy of my visit note below.    Service Date: 2020      Fior Jensen NP   [facility]    [address] [place]  [address]      RE: Sheila Barraza   MRN: [id number]   : 1950      Dear MsRachell Mikey:      We spoke to Ms. Barraza as part of a telephone visit in Cerebrovascular Clinic on 2020.  As you know, she is nearly 7 months out from resection of a symptomatic, previously ruptured brainstem cavernous malformation.  In addition, she had a third trigeminal rhizotomy with our partner, Dr. Pierre.  She has had a rough course, with a lot of the problems coming from her trigeminal neuralgia.  Certainly, she has residual symptoms from her brainstem hemorrhage.  She still has disequilibrium fields, as if her eyes do not focus.  Her eyelids feel \"stiff\".  She feels fine when she is sitting but when she starts moving, she feels as if her body is completely wrapped in tape.  She no longer uses assistance to walk.  She can walk about 2 blocks independently but does feel off.  She has not fallen.  She denies any vertigo.  She no longer has any facial pain, but she has a dense numbness on the left side of the face.  She is not on any medications for her trigeminal neuralgia at this point.  Her right upper extremity remains numb and her right hand has pain.  She says that both sides of the tongue are numb and that she lacks any taste sensation.  She is able to swallow without any aspiration but has to do this slowly and deliberately.      Over the phone, she has some mild dysarthria, likely due to her tongue numbness.  Otherwise, her speech is fluent and coherent.  Comprehension is normal.      We reviewed her MRI that was done " recently and described our findings.  We see postoperative changes in the left dorsal paramedian medulla.  We do not see any obvious residual lesion.  There is a small amount of hemosiderin staining secondary to the resection cavity.  There is no edema in the brainstem.  The large venous angioma associated with this malformation that is also likely the source of her trigeminal nerve compression is intact.  Overall, her imaging looks favorable.      IMPRESSION AND PLAN:  We will continue occupational therapy and she may benefit from further balance therapy.  Her trigeminal neuralgia does not appear to be an active issue right now.  We will coordinate followup with Dr. Pierre.  We will likely repeat an MRI in 1 year.  Please do not hesitate to contact us with questions.      Sincerely,              AUTUMN BOYD MD             D: 2020   T: 2020   MT: MOOSE      Name:     TANIKA RIVAS   MRN:      [id number]        Account:      [id number]   :      1950           Service Date: 2020      Document: [id number]

## 2020-08-11 NOTE — Clinical Note
"8/11/2020      RE: Sheila Barraza  [address] [address]       Sheila Barraza is a 70 year old female who is being evaluated via a billable telephone visit.      The patient has been notified of following:     \"This telephone visit will be conducted via a call between you and your physician/provider. We have found that certain health care needs can be provided without the need for a physical exam.  This service lets us provide the care you need with a short phone conversation.  If a prescription is necessary we can send it directly to your pharmacy.  If lab work is needed we can place an order for that and you can then stop by our lab to have the test done at a later time.    Telephone visits are billed at different rates depending on your insurance coverage. During this emergency period, for some insurers they may be billed the same as an in-person visit.  Please reach out to your insurance provider with any questions.    If during the course of the call the physician/provider feels a telephone visit is not appropriate, you will not be charged for this service.\"    Patient has given verbal consent for Telephone visit?  YES    What phone number would you like to be contacted at?   [phone]    Phone call duration: *** minutes    Jose Daniel Ferreira, AMANDA    Sheila Barraza is a 70 year old female who is being evaluated via a billable telephone visit.      The patient has been notified of following:     \"This telephone visit will be conducted via a call between you and your physician/provider. We have found that certain health care needs can be provided without the need for a physical exam.  This service lets us provide the care you need with a short phone conversation.  If a prescription is necessary we can send it directly to your pharmacy.  If lab work is needed we can place an order for that and you can then stop by our lab to have the test done at a later time.    Telephone visits are billed at different " "rates depending on your insurance coverage. During this emergency period, for some insurers they may be billed the same as an in-person visit.  Please reach out to your insurance provider with any questions.    If during the course of the call the physician/provider feels a telephone visit is not appropriate, you will not be charged for this service.\"    Patient has given verbal consent for Telephone visit?  YES    What phone number would you like to be contacted at?   [phone]    Phone call duration: 20 minutes    Jose Daniel Ferreira, EMT    See dictated note.    MD Catarina Kline MD    "

## 2020-08-11 NOTE — LETTER
"2020       RE: Sheila Barraza  [address] [address]     Dear Colleague,    Thank you for referring your patient, Sheila Barraza, to the [facility] Please see a copy of my visit note below.    Service Date: 2020      Fior Jensen NP   [facility]    [address] [place]  [address]      RE: Sheila Barraza   MRN: [id number]   : 1950      Dear MsRachell Mikey:      We spoke to Ms. Barraza as part of a telephone visit in Cerebrovascular Clinic on 2020.  As you know, she is nearly 7 months out from resection of a symptomatic, previously ruptured brainstem cavernous malformation.  In addition, she had a third trigeminal rhizotomy with our partner, Dr. Pierre.  She has had a rough course, with a lot of the problems coming from her trigeminal neuralgia.  Certainly, she has residual symptoms from her brainstem hemorrhage.  She still has disequilibrium fields, as if her eyes do not focus.  Her eyelids feel \"stiff\".  She feels fine when she is sitting but when she starts moving, she feels as if her body is completely wrapped in tape.  She no longer uses assistance to walk.  She can walk about 2 blocks independently but does feel off.  She has not fallen.  She denies any vertigo.  She no longer has any facial pain, but she has a dense numbness on the left side of the face.  She is not on any medications for her trigeminal neuralgia at this point.  Her right upper extremity remains numb and her right hand has pain.  She says that both sides of the tongue are numb and that she lacks any taste sensation.  She is able to swallow without any aspiration but has to do this slowly and deliberately.      Over the phone, she has some mild dysarthria, likely due to her tongue numbness.  Otherwise, her speech is fluent and coherent.  Comprehension is normal.      We reviewed her MRI that was done " recently and described our findings.  We see postoperative changes in the left dorsal paramedian medulla.  We do not see any obvious residual lesion.  There is a small amount of hemosiderin staining secondary to the resection cavity.  There is no edema in the brainstem.  The large venous angioma associated with this malformation that is also likely the source of her trigeminal nerve compression is intact.  Overall, her imaging looks favorable.      IMPRESSION AND PLAN:  We will continue occupational therapy and she may benefit from further balance therapy.  Her trigeminal neuralgia does not appear to be an active issue right now.  We will coordinate followup with Dr. Pierre.  We will likely repeat an MRI in 1 year.  Please do not hesitate to contact us with questions.      Sincerely,      AUTUMN BOYD MD   D: 2020   T: 2020   MT: MOOSE      Name:     TANIKA RIVAS   MRN:      [id number]        Account:      [id number]   :      1950           Service Date: 2020      Document: [id number]       Again, thank you for allowing me to participate in the care of your patient.  Sincerely,    Autumn Boyd MD

## 2020-08-17 NOTE — PROGRESS NOTES
"Service Date: 2020      Fior Jensen NP   [address]  [address]      RE: Sheila Barraza   MRN: [id number]   : 1950      Dear Ms. Mikey:      We spoke to Ms. Barraza as part of a telephone visit in [facility]brovascular Clinic on 2020.  As you know, she is nearly 7 months out from resection of a symptomatic, previously ruptured brainstem cavernous malformation.  In addition, she had a third trigeminal rhizotomy with our partner, Dr. Pierre.  She has had a rough course, with a lot of the problems coming from her trigeminal neuralgia.  Certainly, she has residual symptoms from her brainstem hemorrhage.  She still has disequilibrium fields, as if her eyes do not focus.  Her eyelids feel \"stiff\".  She feels fine when she is sitting but when she starts moving, she feels as if her body is completely wrapped in tape.  She no longer uses assistance to walk.  She can walk about 2 blocks independently but does feel off.  She has not fallen.  She denies any vertigo.  She no longer has any facial pain, but she has a dense numbness on the left side of the face.  She is not on any medications for her trigeminal neuralgia at this point.  Her right upper extremity remains numb and her right hand has pain.  She says that both sides of the tongue are numb and that she lacks any taste sensation.  She is able to swallow without any aspiration but has to do this slowly and deliberately.      Over the phone, she has some mild dysarthria, likely due to her tongue numbness.  Otherwise, her speech is fluent and coherent.  Comprehension is normal.      We reviewed her MRI that was done recently and described our findings.  We see postoperative changes in the left dorsal paramedian medulla.  We do not see any obvious residual lesion.  There is a small amount of hemosiderin staining secondary to the resection cavity.  There is no edema in the brainstem.  The " large venous angioma associated with this malformation that is also likely the source of her trigeminal nerve compression is intact.  Overall, her imaging looks favorable.      IMPRESSION AND PLAN:  We will continue occupational therapy and she may benefit from further balance therapy.  Her trigeminal neuralgia does not appear to be an active issue right now.  We will coordinate followup with Dr. Pierre.  We will likely repeat an MRI in 1 year.  Please do not hesitate to contact us with questions.      Sincerely,            MD AUTUMN Slaed MD             D: 2020   T: 2020   MT: MOOSE      Name:     TANIKA RIVAS   MRN:      [id number]        Account:      [id number]   :      1950           Service Date: 2020      Document: [id number]

## 2021-08-18 ENCOUNTER — TELEPHONE (OUTPATIENT)
Dept: NEUROSURGERY | Facility: CLINIC | Age: 71
End: 2021-08-18

## 2021-08-23 ENCOUNTER — TELEPHONE (OUTPATIENT)
Dept: NEUROSURGERY | Facility: CLINIC | Age: 71
End: 2021-08-23

## 2021-08-23 NOTE — TELEPHONE ENCOUNTER
Writer unable to contact patient.   Patient did not have voice mail.   Received busy signal x2.     Writer extended MRI orders per patient.    Rachelle Huang LPN  Neurosurgery

## 2021-08-23 NOTE — TELEPHONE ENCOUNTER
Health Call Center    Phone Message    May a detailed message be left on voicemail: yes     Reason for Call: Order(s): Other:   Reason for requested: MRI order  Date needed: ASAP  Provider name: Dr. Prabhakar    Patient is requesting to have MRI order extended to 12/202, please call patient at [phone] or [phone] when ready.      Action Taken: Message routed to:  [facility] ([facility] Neurosurgery    Travel Screening: Not Applicable

## 2021-08-26 ENCOUNTER — CARE COORDINATION (OUTPATIENT)
Dept: NEUROSURGERY | Facility: CLINIC | Age: 71
End: 2021-08-26

## 2021-10-19 ENCOUNTER — OFFICE VISIT (OUTPATIENT)
Dept: NEUROSURGERY | Facility: CLINIC | Age: 71
End: 2021-10-19
Payer: MEDICARE

## 2021-10-19 ENCOUNTER — ANCILLARY PROCEDURE (OUTPATIENT)
Dept: MRI IMAGING | Facility: CLINIC | Age: 71
End: 2021-10-19
Attending: NEUROLOGICAL SURGERY
Payer: MEDICARE

## 2021-10-19 VITALS
OXYGEN SATURATION: 98 % | HEART RATE: 78 BPM | BODY MASS INDEX: 23.8 KG/M2 | WEIGHT: 143 LBS | DIASTOLIC BLOOD PRESSURE: 84 MMHG | SYSTOLIC BLOOD PRESSURE: 146 MMHG

## 2021-10-19 DIAGNOSIS — Q28.3 CEREBRAL CAVERNOUS MALFORMATION: Primary | ICD-10-CM

## 2021-10-19 DIAGNOSIS — Q28.3 CEREBRAL CAVERNOUS MALFORMATION: ICD-10-CM

## 2021-10-19 PROCEDURE — 99214 OFFICE O/P EST MOD 30 MIN: CPT | Performed by: NEUROLOGICAL SURGERY

## 2021-10-19 PROCEDURE — 70553 MRI BRAIN STEM W/O & W/DYE: CPT | Performed by: RADIOLOGY

## 2021-10-19 PROCEDURE — A9585 GADOBUTROL INJECTION: HCPCS | Performed by: RADIOLOGY

## 2021-10-19 PROCEDURE — 99207 MRA BRAIN (CIRCLE OF WILLIS) W CONTRAST: CPT | Performed by: RADIOLOGY

## 2021-10-19 RX ORDER — GADOBUTROL 604.72 MG/ML
7.5 INJECTION INTRAVENOUS ONCE
Status: COMPLETED | OUTPATIENT
Start: 2021-10-19 | End: 2021-10-19

## 2021-10-19 RX ORDER — FOLIC ACID 0.8 MG
1 TABLET ORAL EVERY 24 HOURS
COMMUNITY

## 2021-10-19 RX ADMIN — GADOBUTROL 6 ML: 604.72 INJECTION INTRAVENOUS at 09:59

## 2021-10-19 NOTE — LETTER
"10/19/2021       RE: Sheila Barraza  [address] [address]     Dear Colleague,    Thank you for referring your patient, Sheila Barraza, to the [facility] NEUROSURGERY CLINIC [facility]. Please see a copy of my visit note below.    Cerebrovascular Neurosurgery Clinic      Name: Sheila Barraza  : 1950  Referring provider: Fior Jensen NP  10/19/2021      Reason for visit: Follow up visit    Dear Ms. Jensen:     We saw Ms. Barraza back in Cerebrovascular Clinic today for follow up.  As you know, she is nearly 9 months out from resection of a symptomatic, previously ruptured brainstem cavernous malformation. In addition, she had a third trigeminal rhizotomy with our partner, Dr. Pierre on 03/10/2020. She has had a turbulent course, with a lot of the problems coming from her trigeminal neuralgia. We believe the associated venous angioma is causing the left sided trigeminal neuralgia.    We last saw the patient virtually on 2021 and she complained of continued disequilibrium fields as if her eyes do not focus. She described feeling fine while sitting, but feeling as though her body is \"completely wrapped in tape\" when she starts moving. The patient also describes left sided facial numbness, right upper extremity numbness, bilateral tongue numbness with loss of taste, and right hand pain. She reported she was able to swallow without aspiration, but had to do this slowly and deliberately. We recommended continued occupational therapy, further balance therapy, and follow-up with Dr. Pierre.     Today, the patient endorses numbness in her right arm, right knee, and bilateral hips. She reports she still has function of these areas and can sense hot and cold. She denies any pins and needles sensation. The patient also endorses vision changes and reports she cannot focus with both of her eyes open. She denies any diplopia, but " "notes everything appears as though a film is covering it. She reports it feels \"like there is a string around my eye\". The patient reports her lips have been \"frozen\" since her second surgery and endorses continued loss of taste. She endorses left ear fullness and tinnitus that she reports feels \"like I have air in my head\". The patient states she saw a specialist who started her on Zyrtec which she reports improved her symptoms.She reports she has to be careful swallowing, specifically with solids, breads, and vitamins. She denies any voice changes, hearing loss, nausea, vomiting, or vertigo. The patient also denies any trigeminal neuralgia pain.      Of note, the patient is active and walks 1.5-2 miles daily.     Review of Systems:   Pertinent items are noted in HPI, remainder of complete ROS is negative.      Physical Exam:   BP (!) 146/84   Pulse 78   Wt 64.9 kg (143 lb)   LMP  (LMP Unknown)   SpO2 98%   BMI 23.80 kg/m         Neurologic: The patient is fully oriented and appears comfortable. Mild dysarthria. Extraocular movements are full without nystagmus. Diminished sensation bilaterally throughout the face. Facial nerve function is normal without synkinesis. Palate is symmetric. Shoulders are full strength. Tongue is midline. Diminished sensation thought right upper extremity to light touch. There is no pronator drift. Full strength in all extremities. No dysmetria with finger-nose-finger testing. Gait has is slightly wide-based but steady. No evidence of exposure keratopathy. No ptosis. Suboccipital wound has healed well and there is no pseudo-meningocele.    Imaging:  We reviewed her recent MRI from today and compared it to the prior MRI from last year. This demonstrates there is some residual hemosiderin staining in the left dorsal medulla at the malformation resection site but we do not see any obvious residual or recurrent malformation. The very large venous angioma is unchanged.     Assessment and " Plan:   1. It is surprising that her recovery has been so prolonged, but we are encouraged by the progress she has made. She may continue to enjoy improvement in her right upper extremity sensation. We expect the rate of recovery to be very slow at this point. We will plan on repeating MRI of the brain in one year and this can be been done in [place]. We will follow-up with her by phone.   2. We will re-connect her with our facial pain clinic so that her trigeminal neuralgia is followed adequately. If her trigeminal neuralgia recurs, she may be reevaluated for microvascular decompression.  3. I encouraged her to contact us should any questions or concerns arise in advance of her next appointment      Please feel free to contact us if we may be of any assistance for Ms. Barraza.    Catarina Prabhakar MD  Department of Neurosurgery  [facility]         Scribe Disclosure:  I, Twila Smart, am serving as a scribe to document services personally performed by Catarina Prabhakar MD at this visit, based upon the provider's statements to me. All documentation has been reviewed by the aforementioned provider prior to being entered into the official medical record.      Again, thank you for allowing me to participate in the care of your patient.      Sincerely,    Catarina Prabhakar MD

## 2021-10-19 NOTE — LETTER
"10/19/2021      RE: Sheila Barraza  [address] [facility] [address]     Cerebrovascular Neurosurgery Clinic      Name: Sheila Barraza  : 1950  Referring provider: iFor Jensen NP  10/19/2021      Reason for visit: Follow up visit    Dear Ms. Piñaon:     We saw Ms. Barraza back in Cerebrovascular Clinic today for follow up.  As you know, she is nearly 9 months out from resection of a symptomatic, previously ruptured brainstem cavernous malformation. In addition, she had a third trigeminal rhizotomy with our partner, Dr. Pierre on 03/10/2020. She has had a turbulent course, with a lot of the problems coming from her trigeminal neuralgia. We believe the associated venous angioma is causing the left sided trigeminal neuralgia.    We last saw the patient virtually on 2021 and she complained of continued disequilibrium fields as if her eyes do not focus. She described feeling fine while sitting, but feeling as though her body is \"completely wrapped in tape\" when she starts moving. The patient also describes left sided facial numbness, right upper extremity numbness, bilateral tongue numbness with loss of taste, and right hand pain. She reported she was able to swallow without aspiration, but had to do this slowly and deliberately. We recommended continued occupational therapy, further balance therapy, and follow-up with Dr. Pierre.     Today, the patient endorses numbness in her right arm, right knee, and bilateral hips. She reports she still has function of these areas and can sense hot and cold. She denies any pins and needles sensation. The patient also endorses vision changes and reports she cannot focus with both of her eyes open. She denies any diplopia, but notes everything appears as though a film is covering it. She reports it feels \"like there is a string around my eye\". The patient reports her lips have been \"frozen\" since her second surgery and endorses continued loss of taste. She endorses " "left ear fullness and tinnitus that she reports feels \"like I have air in my head\". The patient states she saw a specialist who started her on Zyrtec which she reports improved her symptoms.She reports she has to be careful swallowing, specifically with solids, breads, and vitamins. She denies any voice changes, hearing loss, nausea, vomiting, or vertigo. The patient also denies any trigeminal neuralgia pain.      Of note, the patient is active and walks 1.5-2 miles daily.     Review of Systems:   Pertinent items are noted in HPI, remainder of complete ROS is negative.      Physical Exam:   BP (!) 146/84   Pulse 78   Wt 64.9 kg (143 lb)   LMP  (LMP Unknown)   SpO2 98%   BMI 23.80 kg/m         Neurologic: The patient is fully oriented and appears comfortable. Mild dysarthria. Extraocular movements are full without nystagmus. Diminished sensation bilaterally throughout the face. Facial nerve function is normal without synkinesis. Palate is symmetric. Shoulders are full strength. Tongue is midline. Diminished sensation thought right upper extremity to light touch. There is no pronator drift. Full strength in all extremities. No dysmetria with finger-nose-finger testing. Gait has is slightly wide-based but steady. No evidence of exposure keratopathy. No ptosis. Suboccipital wound has healed well and there is no pseudo-meningocele.    Imaging:  We reviewed her recent MRI from today and compared it to the prior MRI from last year. This demonstrates there is some residual hemosiderin staining in the left dorsal medulla at the malformation resection site but we do not see any obvious residual or recurrent malformation. The very large venous angioma is unchanged.     Assessment and Plan:   1. It is surprising that her recovery has been so prolonged, but we are encouraged by the progress she has made. She may continue to enjoy improvement in her right upper extremity sensation. We expect the rate of recovery to be very " slow at this point. We will plan on repeating MRI of the brain in one year and this can be been done in [place]. We will follow-up with her by phone.   2. We will re-connect her with our facial pain clinic so that her trigeminal neuralgia is followed adequately. If her trigeminal neuralgia recurs, she may be reevaluated for microvascular decompression.  3. I encouraged her to contact us should any questions or concerns arise in advance of her next appointment      Please feel free to contact us if we may be of any assistance for Ms. Barraza.    Catarina Prabhakar MD  Department of Neurosurgery  [facility]         Scribe Disclosure:  I, Twila Smart, am serving as a scribe to document services personally performed by Catarina Prabhakar MD at this visit, based upon the provider's statements to me. All documentation has been reviewed by the aforementioned provider prior to being entered into the official medical record.      Catarina Prabhakar MD

## 2021-10-19 NOTE — PATIENT INSTRUCTIONS
Follow up in one year with MRI Brain with and without contrast prior.  Patient may have MRI Brain near home in [facility] in [place].    Dr Prabhakar can use a telephone call to give results.    Call Clarita RN for questions     Thank you for using [facility]

## 2021-10-19 NOTE — PROGRESS NOTES
"  Cerebrovascular Neurosurgery Clinic      Name: Shelia Barraza  : 1950  Referring provider: Fior Jensen NP  10/19/2021      Reason for visit: Follow up visit    Dear Ms. Mikey:     We saw Ms. Barraza back in Cerebrovascular Clinic today for follow up.  As you know, she is nearly 9 months out from resection of a symptomatic, previously ruptured brainstem cavernous malformation. In addition, she had a third trigeminal rhizotomy with our partner, Dr. Pierre on 03/10/2020. She has had a turbulent course, with a lot of the problems coming from her trigeminal neuralgia. We believe the associated venous angioma is causing the left sided trigeminal neuralgia.    We last saw the patient virtually on 2021 and she complained of continued disequilibrium fields as if her eyes do not focus. She described feeling fine while sitting, but feeling as though her body is \"completely wrapped in tape\" when she starts moving. The patient also describes left sided facial numbness, right upper extremity numbness, bilateral tongue numbness with loss of taste, and right hand pain. She reported she was able to swallow without aspiration, but had to do this slowly and deliberately. We recommended continued occupational therapy, further balance therapy, and follow-up with Dr. Pierre.     Today, the patient endorses numbness in her right arm, right knee, and bilateral hips. She reports she still has function of these areas and can sense hot and cold. She denies any pins and needles sensation. The patient also endorses vision changes and reports she cannot focus with both of her eyes open. She denies any diplopia, but notes everything appears as though a film is covering it. She reports it feels \"like there is a string around my eye\". The patient reports her lips have been \"frozen\" since her second surgery and endorses continued loss of taste. She endorses left ear fullness and tinnitus that she reports feels \"like I have air " "in my head\". The patient states she saw a specialist who started her on Zyrtec which she reports improved her symptoms.She reports she has to be careful swallowing, specifically with solids, breads, and vitamins. She denies any voice changes, hearing loss, nausea, vomiting, or vertigo. The patient also denies any trigeminal neuralgia pain.      Of note, the patient is active and walks 1.5-2 miles daily.     Review of Systems:   Pertinent items are noted in HPI, remainder of complete ROS is negative.      Physical Exam:   BP (!) 146/84   Pulse 78   Wt 64.9 kg (143 lb)   LMP  (LMP Unknown)   SpO2 98%   BMI 23.80 kg/m         Neurologic: The patient is fully oriented and appears comfortable. Mild dysarthria. Extraocular movements are full without nystagmus. Diminished sensation bilaterally throughout the face. Facial nerve function is normal without synkinesis. Palate is symmetric. Shoulders are full strength. Tongue is midline. Diminished sensation thought right upper extremity to light touch. There is no pronator drift. Full strength in all extremities. No dysmetria with finger-nose-finger testing. Gait has is slightly wide-based but steady. No evidence of exposure keratopathy. No ptosis. Suboccipital wound has healed well and there is no pseudo-meningocele.    Imaging:  We reviewed her recent MRI from today and compared it to the prior MRI from last year. This demonstrates there is some residual hemosiderin staining in the left dorsal medulla at the malformation resection site but we do not see any obvious residual or recurrent malformation. The very large venous angioma is unchanged.     Assessment and Plan:   1. It is surprising that her recovery has been so prolonged, but we are encouraged by the progress she has made. She may continue to enjoy improvement in her right upper extremity sensation. We expect the rate of recovery to be very slow at this point. We will plan on repeating MRI of the brain in one " year and this can be been done in [place]. We will follow-up with her by phone.   2. We will re-connect her with our facial pain clinic so that her trigeminal neuralgia is followed adequately. If her trigeminal neuralgia recurs, she may be reevaluated for microvascular decompression.  3. I encouraged her to contact us should any questions or concerns arise in advance of her next appointment      Please feel free to contact us if we may be of any assistance for Ms. aBrraza.    Catarina Prabhakar MD  Department of Neurosurgery  [facility]         Scribe Disclosure:  I, Twila Smart, am serving as a scribe to document services personally performed by Catarina Prabhakar MD at this visit, based upon the provider's statements to me. All documentation has been reviewed by the aforementioned provider prior to being entered into the official medical record.

## 2021-10-19 NOTE — DISCHARGE INSTRUCTIONS
MRI Contrast Discharge Instructions    The IV contrast you received today will pass out of your body in your  urine. This will happen in the next 24 hours. You will not feel this process.  Your urine will not change color.    Drink at least 4 extra glasses of water or juice today (unless your doctor  has restricted your fluids). This reduces the stress on your kidneys.  You may take your regular medicines.    If you are on dialysis: It is best to have dialysis today.    If you have a reaction: Most reactions happen right away. If you have  any new symptoms after leaving the hospital (such as hives or swelling),  call your hospital at the correct number below. Or call your family doctor.  If you have breathing distress or wheezing, call 911.    Special instructions: ***    I have read and understand the above information.    Signature:______________________________________ Date:___________    Staff:__________________________________________ Date:___________     Time:__________    [facility] Departments:    ___[place]: [phone]  ___[facility]: [phone]  ___[place]: [phone] ___[facility]: [phone]  ___[facility]: [phone]  ___[facility]: [phone]  ___[phone]  ___[facility]: [phone]  ___[phone]

## 2022-08-19 ENCOUNTER — TELEPHONE (OUTPATIENT)
Dept: NEUROSURGERY | Facility: CLINIC | Age: 72
End: 2022-08-19

## 2022-10-18 ENCOUNTER — VIRTUAL VISIT (OUTPATIENT)
Dept: NEUROSURGERY | Facility: CLINIC | Age: 72
End: 2022-10-18
Payer: MEDICARE

## 2022-10-18 DIAGNOSIS — Q28.3 CEREBRAL CAVERNOUS MALFORMATION: Primary | ICD-10-CM

## 2022-10-18 PROCEDURE — 99442 PR PHYSICIAN TELEPHONE EVALUATION 11-20 MIN: CPT | Mod: 95 | Performed by: NEUROLOGICAL SURGERY

## 2022-10-18 NOTE — PROGRESS NOTES
Sheila is a 72 year old who is being evaluated via a billable telephone visit.      What phone number would you like to be contacted at? [phone]   How would you like to obtain your AVS? Dino  Phone call duration:  15 minutes    Feeling better. Eyes still bother her (tight band around the eyes). Left corneal injury (?) - numerous drops. Balance is poor but no falls. Balance therapy helped a lot. No sense of taste, forces herself to eat. Swallowing is ok but soft foods are difficult (bread). Left face numbness/pain. On gabapentin, has side effects (100 mg bid). Burning dysesthesias in left face is constant but the pre-rhizotomy pain has resolved. Right face numbness is milder.     Mild dysarthria    One episode of right arm and leg numbness following a chiropractor appt 2 weeks ago. Went to ED and blood pressure was very high. Still has some numbness. BP much better now.     Repeat MRI in [place], [place] in one year.     See dictated note.    JULIAN Prabhakar MD

## 2022-10-18 NOTE — LETTER
10/18/2022       RE: Sheila Barraza  [address] [facility] [address]     Dear Colleague,    Thank you for referring your patient, Sheila Barraza, to the [facility] [place] [facility]. Please see a copy of my visit note below.    Sheila is a 72 year old who is being evaluated via a billable telephone visit.      What phone number would you like to be contacted at? [phone]   How would you like to obtain your AVS? MyChart  Phone call duration:  15 minutes    Feeling better. Eyes still bother her (tight band around the eyes). Left corneal injury (?) - numerous drops. Balance is poor but no falls. Balance therapy helped a lot. No sense of taste, forces herself to eat. Swallowing is ok but soft foods are difficult (bread). Left face numbness/pain. On gabapentin, has side effects (100 mg bid). Burning dysesthesias in left face is constant but the pre-rhizotomy pain has resolved. Right face numbness is milder.     Mild dysarthria    One episode of right arm and leg numbness following a chiropractor appt 2 weeks ago. Went to ED and blood pressure was very high. Still has some numbness. BP much better now.     Repeat MRI in [place], [place] in one year.     See dictated note.    JULIAN Prabhakar MD      Service Date: 10/18/2022    Fior Jensen NP  [address] [address]    RE:      Sheila Barraza  MRN:  [id number]  :   1950    Dear Ms. Jensen:    We spoke to Ms. Barraza as part of a telephone followup in Cerebrovascular Clinic today.  As you know, we resected a brainstem cavernous malformation (left inferior dorsal medulla) nearly 3 years ago.  She also had left-sided trigeminal neuralgia.  She underwent a left-sided percutaneous radiofrequency rhizotomy with Dr. Pierre in 2020.  Nearly a month ago, she had an episode of right arm and leg numbness following a chiropractic manipulation.   She went to the emergency department, and her blood pressure was apparently very high.  This was treated, and she was observed overnight and discharged.  It sounds like a stroke workup was done, and this was unremarkable.  Her blood pressure is under better control now.  She still has some residual numbness on the right side.    Overall, she is feeling better.  She still feels like she has a tight band around both eyes.  She has numbness in the left eye and continues to lubricate this generously.  She has burning dysesthesias in the left face that are constant, but the pre-rhizotomy tic pain has resolved.  She remains on gabapentin, but at a low dose, 100 mg b.i.d.  It seems like she has some fatigue even on this low dose.  She has disequilibrium and gait instability, but no falls.  She found balance therapy to be quite useful.  Her sense of taste remains diminished, and she forces herself to eat.  She has not lost any weight because of this. She still has some difficulty with swallowing soft foods, such as bread.  She also has some numbness in the right face, but this is very mild.    Over the phone, the most notable feature is mild dysarthria, which is likely due to her facial numbness.  Otherwise, her language and phonation are normal.    REVIEW OF STUDIES:  We went over her MRI from 08/02/2022, and we also reviewed the MRI and MRA from 09/15/2022.  The MRI shows some postsurgical changes and hemosiderin staining in the dorsal medulla, but no obvious residual or recurrent cavernous malformation.  The prominent venous angioma running through the left dorsolateral vannessa and middle cerebellar peduncle and extending into the cerebellopontine cistern is unchanged.  The MRA of the neck shows no dissection or stenosis in either internal carotid artery or vertebral artery.    IMPRESSION AND PLAN:  Ms. Barraza has plateaued in her recovery.  She can certainly continue using her chiropractic therapy, but perhaps less  aggressive manipulation is better.  She should continue to have her blood pressure management with you.  We will repeat an MRI of the brain in 1 year, and this can be done in [place], and we will follow up with her afterwards.  Please do not hesitate to contact us with questions.      D: 10/18/2022   T: 10/18/2022   MT: suma    Name:     TANIKA RIVAS  MRN:      [id number]        Account:      [id number]   :      1950           Service Date: 10/18/2022       Document: [id number]        Again, thank you for allowing me to participate in the care of your patient.      Sincerely,    Catarina Prabhakar MD

## 2022-10-18 NOTE — PROGRESS NOTES
Service Date: 10/18/2022    Fior Jensen NP  [address] [address]    RE:      Sheila Barraza  MRN:  [id number]  :   1950    Dear Ms. Piñaon:    We spoke to Ms. Barraza as part of a telephone followup in Cerebrovascular Clinic today.  As you know, we resected a brainstem cavernous malformation (left inferior dorsal medulla) nearly 3 years ago.  She also had left-sided trigeminal neuralgia.  She underwent a left-sided percutaneous radiofrequency rhizotomy with Dr. Pierre in 2020.  Nearly a month ago, she had an episode of right arm and leg numbness following a chiropractic manipulation.  She went to the emergency department, and her blood pressure was apparently very high.  This was treated, and she was observed overnight and discharged.  It sounds like a stroke workup was done, and this was unremarkable.  Her blood pressure is under better control now.  She still has some residual numbness on the right side.    Overall, she is feeling better.  She still feels like she has a tight band around both eyes.  She has numbness in the left eye and continues to lubricate this generously.  She has burning dysesthesias in the left face that are constant, but the pre-rhizotomy tic pain has resolved.  She remains on gabapentin, but at a low dose, 100 mg b.i.d.  It seems like she has some fatigue even on this low dose.  She has disequilibrium and gait instability, but no falls.  She found balance therapy to be quite useful.  Her sense of taste remains diminished, and she forces herself to eat.  She has not lost any weight because of this. She still has some difficulty with swallowing soft foods, such as bread.  She also has some numbness in the right face, but this is very mild.    Over the phone, the most notable feature is mild dysarthria, which is likely due to her facial numbness.  Otherwise, her language and phonation are normal.    REVIEW OF STUDIES:  We went over  her MRI from 2022, and we also reviewed the MRI and MRA from 09/15/2022.  The MRI shows some postsurgical changes and hemosiderin staining in the dorsal medulla, but no obvious residual or recurrent cavernous malformation.  The prominent venous angioma running through the left dorsolateral vannessa and middle cerebellar peduncle and extending into the cerebellopontine cistern is unchanged.  The MRA of the neck shows no dissection or stenosis in either internal carotid artery or vertebral artery.    IMPRESSION AND PLAN:  Ms. Barraza has plateaued in her recovery.  She can certainly continue using her chiropractic therapy, but perhaps less aggressive manipulation is better.  She should continue to have her blood pressure management with you.  We will repeat an MRI of the brain in 1 year, and this can be done in [place], and we will follow up with her afterwards.  Please do not hesitate to contact us with questions.    Sincerely,    Catarina Prabhakar MD        D: 10/18/2022   T: 10/18/2022   MT: suma    Name:     TANIKA BARRAZA  MRN:      [id number]        Account:      [id number]   :      1950           Service Date: 10/18/2022       Document: [id number]

## 2022-10-18 NOTE — LETTER
October 18, 2022       TO: Sheila Barraza  [address] [address]       DearMs.Christi,    We are writing to inform you of your test results.    {ump results letter list:[id number]}    No results found from the In Basket message.    ***

## 2022-10-18 NOTE — PATIENT INSTRUCTIONS
Follow up with Dr. Prabhakar in 1 year with MRI Brain with and without contrast prior in [place] Prior    Stroke & Endovascular RN Care Coordinators:     Onofre Mishra, RN   Daisha Shetty, RN, CNRN, SCRN     If you have any questions please contact the RN Care Coordinators at [phone], option 3.     After business hours call the  at [phone] and have the Neuro-Interventional Fellow paged.     Thank you for choosing [facility] for your health care needs.

## 2023-08-18 ENCOUNTER — TELEPHONE (OUTPATIENT)
Dept: NEUROSURGERY | Facility: CLINIC | Age: 73
End: 2023-08-18
Payer: MEDICARE

## 2023-08-29 ENCOUNTER — TRANSFERRED RECORDS (OUTPATIENT)
Dept: HEALTH INFORMATION MANAGEMENT | Facility: CLINIC | Age: 73
End: 2023-08-29

## 2023-10-17 ENCOUNTER — VIRTUAL VISIT (OUTPATIENT)
Dept: NEUROSURGERY | Facility: CLINIC | Age: 73
End: 2023-10-17
Payer: MEDICARE

## 2023-10-17 VITALS — BODY MASS INDEX: 22.47 KG/M2 | WEIGHT: 135 LBS

## 2023-10-17 DIAGNOSIS — G50.0 TRIGEMINAL NEURALGIA: ICD-10-CM

## 2023-10-17 DIAGNOSIS — Q28.3 CEREBRAL CAVERNOUS MALFORMATION: Primary | ICD-10-CM

## 2023-10-17 PROCEDURE — 99442 PR PHYSICIAN TELEPHONE EVALUATION 11-20 MIN: CPT | Mod: 95 | Performed by: NEUROLOGICAL SURGERY

## 2023-10-17 ASSESSMENT — PAIN SCALES - GENERAL: PAINLEVEL: MILD PAIN (3)

## 2023-10-17 NOTE — PATIENT INSTRUCTIONS
Follow-up with Dr. Prabhakar in 2 years with an MRI prior to your appointment.     We will have our Facial Pain Clinic contact you.     Stroke & Endovascular RN Care Coordinators:    Maureen Plascencia, RN, BSN  Daisha Shetty, RN, CNRN, SCRN    If you have any questions please contact the RN Care Coordinators at [phone], option 1.     After business hours call the  at [phone] and have the Neuro-Interventional Fellow paged.    Thank you for choosing [facility] for your health care needs.

## 2023-10-17 NOTE — PROGRESS NOTES
Virtual Visit Details    Type of service:  Telephone Visit   Phone call duration: 20 minutes      Service Date: 10/17/2023     Fior Jensen NP  [address] [address]     RE:      Sheila Barraza  MRN:  [id number]  :   1950     Dear Ms. Jensen:        We spoke to Ms. Barraza as part of a telephone follow-up in cerebrovascular clinic on 10/17/2023.  She is nearly 4 years out from resection of brainstem cavernous malformation (dorsal inferior medulla).  She also has trigeminal neuralgia, likely from a very large venous angioma in the left cerebellopontine cistern.    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 Now she lives alone. She remains very independent.  However, she still has disequilibrium but no falls.  She also describes difficulty reading but visual focusing is better.  She has no diplopia.  There are still left facial dysesthesias and some on the right.  She has undergone previous rhizotomies for trigeminal neuralgia.  She has increased the gabapentin dosage to either 200 or 300 mg 3 times daily. The right arm feels heavy but has full strength.  She is not having any tic pain on the left side.    Over the phone, the most noticeable feature is mild dysarthria. Her language and phonation are normal.    We went over her MRI from 2023 and compared it to previous studies.  There is some hemosiderin staining, as expected, in the resection cavity in the dorsal inferior medulla on the left side.  We do not see any obvious residual or recurrent cavernous malformation.  There is the expected blooming artifact seen on the gradient echo sequence.  This is also unchanged.  The large venous angioma which courses through the left middle cerebellar peduncle, left lateral vannessa and then emerges into the cerebellopontine cistern adjacent to the left trigeminal nerve origin, is also unchanged.    It appears that Ms. Barraza is  having some increased sensory changes in the left face but not any worsening of the classic trigeminal neuralgia.  We will reestablish care with our facial pain clinic and defer to Dr. Pierre regarding further management.  We will repeat an MRI of the brain in 2 years, and this can be done in [place].  She will contact us if she has any new problems in the interim.  Please not hesitate contact us with questions.    Sincerely,        Catarina Prabhakar MD  Department of Neurosurgery

## 2023-10-17 NOTE — LETTER
10/17/2023       RE: Sheila Barraza  [address] [facility] [address]     Dear Colleague,    Thank you for referring your patient, Sheila Barraza, to the [facility]UROS[facility]GERY [facility]IN[facility]. Please see a copy of my visit note below.    Virtual Visit Details    Type of service:  Telephone Visit   Phone call duration: 20 minutes      Service Date: 10/17/2023     Fior Jensen NP  [address] [address]     RE:      Sheila Barraza  MRN:  [id number]  :   1950     Dear Ms. Jensen:        We spoke to Ms. Barraza as part of a telephone follow-up in cerebrovascular clinic on 10/17/2023.  She is nearly 4 years out from resection of brainstem cavernous malformation (dorsal inferior medulla).  She also has trigeminal neuralgia, likely from a very large venous angioma in the left cerebellopontine cistern.    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 Now she lives alone. She remains very independent.  However, she still has disequilibrium but no falls.  She also describes difficulty reading but visual focusing is better.  She has no diplopia.  There are still left facial dysesthesias and some on the right.  She has undergone previous rhizotomies for trigeminal neuralgia.  She has increased the gabapentin dosage to either 200 or 300 mg 3 times daily. The right arm feels heavy but has full strength.  She is not having any tic pain on the left side.    Over the phone, the most noticeable feature is mild dysarthria. Her language and phonation are normal.    We went over her MRI from 2023 and compared it to previous studies.  There is some hemosiderin staining, as expected, in the resection cavity in the dorsal inferior medulla on the left side.  We do not see any obvious residual or recurrent cavernous malformation.  There is the expected blooming artifact  seen on the gradient echo sequence.  This is also unchanged.  The large venous angioma which courses through the left middle cerebellar peduncle, left lateral vannessa and then emerges into the cerebellopontine cistern adjacent to the left trigeminal nerve origin, is also unchanged.    It appears that Ms. Barraza is having some increased sensory changes in the left face but not any worsening of the classic trigeminal neuralgia.  We will reestablish care with our facial pain clinic and defer to Dr. Pierre regarding further management.  We will repeat an MRI of the brain in 2 years, and this can be done in [place].  She will contact us if she has any new problems in the interim.  Please not hesitate contact us with questions.        Again, thank you for allowing me to participate in the care of your patient.      Sincerely,    Catarina Prabhakar MD

## 2023-10-17 NOTE — NURSING NOTE
Is the patient currently in the state of [place]? NO    Visit mode:TELEPHONE    If the visit is dropped, the patient can be reconnected by: TELEPHONE VISIT: Phone number:   Telephone Information:   Mobile [phone]       Will anyone else be joining the visit? NO  (If patient encounters technical issues they should call [phone])    How would you like to obtain your AVS? Mail a copy    Are changes needed to the allergy or medication list? No, Pt stated no changes to allergies, and Pt stated no med changes    Reason for visit: CHAVA CARMONA

## 2024-05-13 ENCOUNTER — TELEPHONE (OUTPATIENT)
Dept: NEUROSURGERY | Facility: CLINIC | Age: 74
End: 2024-05-13
Payer: MEDICARE

## 2024-05-13 NOTE — TELEPHONE ENCOUNTER
Received Vocera call from Dr. Martin in [place] re a question he has for Dr. Prabhakar. Dr. Martin started pt on 81 mg aspirin daily and would like to start her on Plavix as well, but wanted to make sure the addition of Plavix is safe from Dr. Prabhakar's perspective.     Dr. Martin can be reached at [phone]. Nothing further at this time.

## 2024-05-15 NOTE — TELEPHONE ENCOUNTER
Spoke with Dr. Prabhakar on 5/14/24 regarding request. RN provided Dr. Prabhakar with Dr. Martin's contact information.     [facility] for Dr. Martin to return RN call, if he has not spoken with Dr. Prabhakar yet, to discuss reason for adding Plavix. Direct number provided.     Daisha Shetty RN 5/15/2024 9:42 AM     Addendum:  spoke with Dr. Martin. States patient had lacunar stroke 4 days ago. Initial MRI did not show stroke. Second MRI 30 hours after symptom onset showed stroke in BG, then gave aspirin 325 mg x 1, then decreased to 81 daily.     In these cases would start aspirin and Plavix x 21 days however MD spoke with patient and decided to proceed with aspirin monotherapy due to prior brain stem bleed. If patient has another stroke/symptoms will consider adding Plavix.    Asymptomatic since. Doing well. Left side weakness improved. Will discharge to rehab.    Dr. Martin would like to know annual bleed risk for cavernoma.     Daisha Shetty RN 5/15/2024 9:53 AM     Addendum: Per Dr. Prabhakar annual bleed risk in the brainstem is a widely varied number. It can be 0.25% to 4% per year. Because patient hasn't had any recent hemorrhage, risk is on the lower side, 1-2% per year. Informed Dr. Martin who was appreciative of the information. He will call with any other questions.     Daisha Shetty RN 5/15/2024 12:07 PM

## 2025-02-17 ENCOUNTER — TELEPHONE (OUTPATIENT)
Dept: NEUROSURGERY | Facility: CLINIC | Age: 75
End: 2025-02-17
Payer: MEDICARE

## 2025-02-17 DIAGNOSIS — Q28.3 CEREBRAL CAVERNOUS MALFORMATION: Primary | ICD-10-CM

## 2025-02-17 NOTE — TELEPHONE ENCOUNTER
Sched pt on August 26th with Dr Prabhakar. Pt will call [facility] to sched MRI. Order was faxed to [phone].

## 2025-03-06 ENCOUNTER — TELEPHONE (OUTPATIENT)
Dept: NEUROSURGERY | Facility: CLINIC | Age: 75
End: 2025-03-06
Payer: MEDICARE

## 2025-03-06 NOTE — TELEPHONE ENCOUNTER
MRI scheduled for mid March in [place] near home.  Patient to call back after MRI is completed, to get films pushed to the [facility] system.    Voices understanding

## 2025-04-09 NOTE — PROGRESS NOTES
01/29/20 1100   General Information, SLP   Type of Evaluation Speech and Language;Cognitive-Linguistic;Dysarthria   Type of Visit Initial   Start of Care Date 01/29/20   Onset of Illness/Injury or Date of Surgery - Date 01/22/20   Referring Physician Dr Jelly MD   Patient/Family Goals Statement to return home to former level of independence   Pertinent History of Current Problem Sheila Barraza is a 69 year old right hand dominant female with brainstem cavernoma status post hemorrhage now resected via suboccipital craniotomy on 1/22/20. PMHx HTN, IBS, HLD, trigeminal neuralgia s/p two rhizotomies at [facility] on the left side, inguinal hernia repair, and [facility].  Hospital course complicated by  a fall.  Admitted to acute inpatient rehab 01/28/20. 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   Speech Comments Pt reports numbness - oral motor ; is mildly dysarthric and also is recovering from mouth sores   Language: Auditory Comprehension (understanding of spoken language)   Tests were administered at the following levels Complex (vocation/community/social activities)   Paragraph; Discourse Comprehension Test (out of 8 total; less than 7 is below mean) 6   Functional Assessment Scale (Auditory Comprehension) Minimal Impairment   Comments (Auditory Comprehension) Pt with 2 errors- although seem more related to short term impairements vs auditory comprehension difficulties   Language: Verbal Expression (use of spoken language to express information)   Tests were administered at the following levels Complex (vocation/community/social activities)   Define Words; [place] Test  for Differential Diagnosis Of Aphasia (out of 10 total) 10   Generative Naming Score; Cognitive Linguistic Quick Test 5   Generative Naming; Cognitive Linguistic Quick Test Result WNL   Conversation; [place] Diagnostic Aphasia Exam rating (out of 5 total) 5   Functional Assessment Scale (Verbal Expression) No Impairment   Comments (Verbal Expression) some reduced thought organization with verbal fluency test- but appears more related to cognition demands of test   Reading Comprehension (understanding of written language)   Tests were administered at the following levels Complex (vocation/community/social activities)   Sentences and Paragraphs; [place] Diagnostic Aphasia Exam (out of 10 total) 9   Functional Assessment Scale (Reading Comprehension) No Impairment   Written Expression (use of writing to express information)   Tests were administered at the following levels Moderate (routine daily activities)   Generate Sentences; [place] Test for Differential Diagnosis Of Aphasia (out of 6 total) 6   Functional Assessment Scale (Written Expression) No Impairment   Cognitive Status Examination   Attention impaired  (100% sustained &alternating; 8+errors flexible attn)   Behavioral Observations WFL   Orientation 5   Short Term Memory impaired  (2/3 delayed recall; 6/15 paragraph recall)   Long Term Memory intact   Reasoning impaired  (4/4 verbal p.s;6/6 verbal reason ( but slow); 1/2 numerical )   Executive Function Deficits Noted mental flexibility;self-correction   Additional cognitive-linguistic evaluation indicated  recommend   Standardized cognitive-linguistic assessment completed to be completed during future session   Cognitive Status Exam Comments Pt presents with mild impairements in ihigher level reasoning an dnumerics- longer processing to complete and pt is a fomer . Moderate impaimrents with St recall and flexible attention but alternating attn and sustained attn are intact   General Therapy  Interventions   Planned Therapy Interventions Dysphagia Treatment;Cognitive Treatment;Communication   Dysphagia treatment Oropharyngeal exercise training;Modified diet education;Instruction of safe swallow strategies   Cognitive treatment Internal memory strategy training;External memory strategy training;Progressive attention training   Communication Improve speech intelligibility   Clinical Impression, SLP Eval   Criteria for Skilled Therapeutic Interventions Met Yes;Treatment indicated   SLP Diagnosis Mild- moderate cognitive- communication deficits   Influenced by the following factors/impairments prior baseline level of functioning   Rehab Potential Good, to achieve stated therapy goals   Therapy Frequency Daily   Predicted Duration of Therapy Intervention (days/wks) 7-10 days   Risks and Benefits of Treatment have been explained. Yes   Patient, Family & other staff in agreement with plan of care Yes   Clinical Impression Comments Completed formal speech and language eval and informal cognitive assessment per MD orders- pt presents with mild dysarthria states that her mouth and tongue still feel numb- pt is mostly 100% intelligible but imprecision in speech is noted. Word retrieval/verbal expression, reading comprehension for complex info are WFL. Pt does have some minimal impairments in complex auditory comprehension but these seem more related to deficits in ST recall. Written expression is WFL. Pt demonstrates mild- moderate impairments in cognition characterized by moderate deficits in ST memory, moderate impairments in flexible attention and mild impairments in numerical reasoning/ processing time. Pt's current level of cogntive and speech functioning are below baseline and pt will benefit from skilled SLP intervention for improved safety and independence in IADL's   Total Evaluation Time      Total Evaluation Time (Minutes) 60      Capillary refill less/equal to 2 seconds

## 2025-08-21 ENCOUNTER — DOCUMENTATION ONLY (OUTPATIENT)
Dept: NEUROSURGERY | Facility: CLINIC | Age: 75
End: 2025-08-21
Payer: MEDICARE

## 2025-08-26 ENCOUNTER — VIRTUAL VISIT (OUTPATIENT)
Dept: NEUROSURGERY | Facility: CLINIC | Age: 75
End: 2025-08-26
Payer: MEDICARE

## 2025-08-26 VITALS — BODY MASS INDEX: 19.99 KG/M2 | WEIGHT: 120 LBS | HEIGHT: 65 IN

## 2025-08-26 DIAGNOSIS — Q28.3 CEREBRAL CAVERNOUS MALFORMATION: Primary | ICD-10-CM

## 2025-08-26 RX ORDER — ASPIRIN 81 MG/1
81 TABLET ORAL
COMMUNITY
Start: 2024-06-21

## 2025-08-26 ASSESSMENT — PAIN SCALES - GENERAL: PAINLEVEL_OUTOF10: MODERATE PAIN (4)

## (undated) DEVICE — SYR INFLATOR KYPHX  A08A

## (undated) DEVICE — PREP POVIDONE IODINE SCRUB 7.5% 4OZ APL82212

## (undated) DEVICE — SU VICRYL 2-0 CT-2 CR 8X18" J726D

## (undated) DEVICE — PREP CHLORAPREP CLEAR 3ML 260400

## (undated) DEVICE — SOL WATER IRRIG 1000ML BOTTLE 2F7114

## (undated) DEVICE — WIPES FOLEY CARE SURESTEP PROVON DFC100

## (undated) DEVICE — ADPT 5 IN 1 360

## (undated) DEVICE — BUR STRK 2.3MM TAPERED ROUTER - FA2 5407-FA2-023

## (undated) DEVICE — DRAPE CRANIOTOMY W/POUCH 9450

## (undated) DEVICE — PAD CHUX UNDERPAD 23X24" 7136

## (undated) DEVICE — IOM SUPPLIES

## (undated) DEVICE — NDL BX BONE MARROW 11GA 4" JAMSHIDI  DJ4011X

## (undated) DEVICE — CATH TRAY FOLEY SURESTEP 16FR W/TMP PRB STLK LATEX A319416AM

## (undated) DEVICE — PERFORATOR 14MM CODMAN

## (undated) DEVICE — ESU GROUND PAD ADULT W/CORD E7507

## (undated) DEVICE — NIM PROBE PRASS INCREMENTING TIP 8225825

## (undated) DEVICE — PREP SKIN SCRUB TRAY 4461A

## (undated) DEVICE — SYR 30ML LL W/O NDL 302832

## (undated) DEVICE — LINEN TOWEL PACK X30 5481

## (undated) DEVICE — SU NUROLON 4-0 TF CR 8X18" C584D

## (undated) DEVICE — SU ETHILON 3-0 PS-1 18" 1663H

## (undated) DEVICE — DRAPE MICROSCOPE LEICA 54X150" AR8033650

## (undated) DEVICE — ADAPTER SCOPE UROLOK II LF M0067301400

## (undated) DEVICE — SPONGE COTTONOID 1/2X1/2" 20-04S

## (undated) DEVICE — LINEN TOWEL PACK X5 5464

## (undated) DEVICE — IOM CASE FEE

## (undated) DEVICE — DRAPE MAYO STAND 23X54 8337

## (undated) DEVICE — DRAPE SHEET MED 44X70" 9355

## (undated) DEVICE — SYR 01ML 27GA 0.5" NDL TBC 309623

## (undated) DEVICE — CLIP HORIZON SM RED WIDE SLOT 001201

## (undated) DEVICE — BUR STRK CARBIDE ROUND 4.0MM 5820-110-040C

## (undated) DEVICE — DRSG BANDAID 1X3" FABRIC CURITY LATEX FREE KC44101

## (undated) DEVICE — DECANTER BAG 2002S

## (undated) DEVICE — GLOVE PROTEXIS MICRO 7.5  2D73PM75

## (undated) DEVICE — DRAPE POUCH INSTRUMENT 1018

## (undated) DEVICE — RETR ELASTIC STAYS LONE STAR BLUNT DUAL LEAD 3550-1G

## (undated) DEVICE — COVER CAMERA VIDEO LASER 9X96" 04-CC227

## (undated) DEVICE — BLADE KNIFE BEAVER MICROSHARP GREEN 377515

## (undated) DEVICE — SPONGE COTTONOID 1/4X1/4" 20-01S

## (undated) DEVICE — SYR 10ML FINGER CONTROL W/O NDL 309695

## (undated) DEVICE — PADS CPR ELEC ZOLL ONE-STEP ADULT 8900-0224-01

## (undated) DEVICE — CLIP HORIZON MED BLUE 002200

## (undated) DEVICE — MARKER SPHERES PASSIVE MEDT PACK 5 8801075

## (undated) DEVICE — DRSG STERI STRIP 1/4X4" R1546

## (undated) DEVICE — SPONGE SURGIFOAM 100 1974

## (undated) DEVICE — ESU CORD BIPOLAR AND IRR TUBING AESCULAP US355

## (undated) DEVICE — LINEN TOWEL PACK X6 WHITE 5487

## (undated) DEVICE — SURGICEL HEMOSTAT 4X8" 1952

## (undated) DEVICE — RX BACITRACIN OINTMENT 0.9G 1/32OZ CUR001109

## (undated) DEVICE — SPONGE COTTONOID 1X3" 20-10S

## (undated) DEVICE — SPONGE SURGIFOAM 01GM POWDER 1978

## (undated) DEVICE — SPONGE COTTONOID 1/2X1 1/2" 20-06S

## (undated) DEVICE — DRSG GAUZE 4X4" TRAY 6939

## (undated) DEVICE — SPONGE COTTONOID 1/2X3" 20-07S

## (undated) DEVICE — PIN SKULL MAYFIELD ADULT TITANIUM 3/PK A1120

## (undated) DEVICE — SOL RINGERS LACTATED 1000ML BAG 07953-09

## (undated) DEVICE — NDL 15GA 1.5" 8881200029

## (undated) DEVICE — NDL ANGIOCATH 14GA 1.25" 4048

## (undated) DEVICE — NDL COUNTER 20CT 31142493

## (undated) DEVICE — LABEL MEDICATION SYSTEM 3303-P

## (undated) DEVICE — SOL NACL 0.9% 10ML VIAL 0409-4888-02

## (undated) DEVICE — DRSG PRIMAPORE 03 1/8X6" 66000318

## (undated) RX ORDER — HYDROMORPHONE HCL/0.9% NACL/PF 0.2MG/0.2
SYRINGE (ML) INTRAVENOUS
Status: DISPENSED
Start: 2020-03-10

## (undated) RX ORDER — LIDOCAINE HYDROCHLORIDE 20 MG/ML
INJECTION, SOLUTION EPIDURAL; INFILTRATION; INTRACAUDAL; PERINEURAL
Status: DISPENSED
Start: 2020-03-10

## (undated) RX ORDER — DEXAMETHASONE SODIUM PHOSPHATE 4 MG/ML
INJECTION, SOLUTION INTRA-ARTICULAR; INTRALESIONAL; INTRAMUSCULAR; INTRAVENOUS; SOFT TISSUE
Status: DISPENSED
Start: 2020-01-22

## (undated) RX ORDER — EPHEDRINE SULFATE 50 MG/ML
INJECTION, SOLUTION INTRAMUSCULAR; INTRAVENOUS; SUBCUTANEOUS
Status: DISPENSED
Start: 2020-01-22

## (undated) RX ORDER — EPHEDRINE SULFATE 50 MG/ML
INJECTION, SOLUTION INTRAMUSCULAR; INTRAVENOUS; SUBCUTANEOUS
Status: DISPENSED
Start: 2020-03-10

## (undated) RX ORDER — FENTANYL CITRATE 50 UG/ML
INJECTION, SOLUTION INTRAMUSCULAR; INTRAVENOUS
Status: DISPENSED
Start: 2020-01-22

## (undated) RX ORDER — LABETALOL HYDROCHLORIDE 5 MG/ML
INJECTION, SOLUTION INTRAVENOUS
Status: DISPENSED
Start: 2020-01-22

## (undated) RX ORDER — PROPOFOL 10 MG/ML
INJECTION, EMULSION INTRAVENOUS
Status: DISPENSED
Start: 2020-03-10

## (undated) RX ORDER — PROPOFOL 10 MG/ML
INJECTION, EMULSION INTRAVENOUS
Status: DISPENSED
Start: 2020-01-22

## (undated) RX ORDER — HYDROMORPHONE HYDROCHLORIDE 1 MG/ML
INJECTION, SOLUTION INTRAMUSCULAR; INTRAVENOUS; SUBCUTANEOUS
Status: DISPENSED
Start: 2020-01-22

## (undated) RX ORDER — BACITRACIN 50000 [IU]/1
INJECTION, POWDER, FOR SOLUTION INTRAMUSCULAR
Status: DISPENSED
Start: 2020-01-22

## (undated) RX ORDER — PHENYLEPHRINE HCL IN 0.9% NACL 1 MG/10 ML
SYRINGE (ML) INTRAVENOUS
Status: DISPENSED
Start: 2020-03-10

## (undated) RX ORDER — LABETALOL HYDROCHLORIDE 5 MG/ML
INJECTION, SOLUTION INTRAVENOUS
Status: DISPENSED
Start: 2020-03-10

## (undated) RX ORDER — ONDANSETRON 2 MG/ML
INJECTION INTRAMUSCULAR; INTRAVENOUS
Status: DISPENSED
Start: 2020-01-22

## (undated) RX ORDER — SODIUM CHLORIDE 9 MG/ML
INJECTION, SOLUTION INTRAVENOUS
Status: DISPENSED
Start: 2020-01-22

## (undated) RX ORDER — HYDROMORPHONE HYDROCHLORIDE 1 MG/ML
INJECTION, SOLUTION INTRAMUSCULAR; INTRAVENOUS; SUBCUTANEOUS
Status: DISPENSED
Start: 2020-03-10

## (undated) RX ORDER — SODIUM CHLORIDE 9 MG/ML
INJECTION, SOLUTION INTRAVENOUS
Status: DISPENSED
Start: 2020-03-10

## (undated) RX ORDER — LIDOCAINE HYDROCHLORIDE 20 MG/ML
INJECTION, SOLUTION EPIDURAL; INFILTRATION; INTRACAUDAL; PERINEURAL
Status: DISPENSED
Start: 2020-01-22

## (undated) RX ORDER — PHENYLEPHRINE HCL IN 0.9% NACL 1 MG/10 ML
SYRINGE (ML) INTRAVENOUS
Status: DISPENSED
Start: 2020-01-22

## (undated) RX ORDER — FENTANYL CITRATE 50 UG/ML
INJECTION, SOLUTION INTRAMUSCULAR; INTRAVENOUS
Status: DISPENSED
Start: 2020-03-10

## (undated) RX ORDER — CEFAZOLIN SODIUM 2 G/100ML
INJECTION, SOLUTION INTRAVENOUS
Status: DISPENSED
Start: 2020-03-10

## (undated) RX ORDER — ONDANSETRON 2 MG/ML
INJECTION INTRAMUSCULAR; INTRAVENOUS
Status: DISPENSED
Start: 2020-03-10

## (undated) RX ORDER — GLYCOPYRROLATE 0.2 MG/ML
INJECTION, SOLUTION INTRAMUSCULAR; INTRAVENOUS
Status: DISPENSED
Start: 2020-03-10

## (undated) RX ORDER — BUPIVACAINE HYDROCHLORIDE AND EPINEPHRINE 5; 5 MG/ML; UG/ML
INJECTION, SOLUTION EPIDURAL; INTRACAUDAL; PERINEURAL
Status: DISPENSED
Start: 2020-01-22

## (undated) RX ORDER — CEFAZOLIN SODIUM 2 G/100ML
INJECTION, SOLUTION INTRAVENOUS
Status: DISPENSED
Start: 2020-03-09

## (undated) RX ORDER — GLYCOPYRROLATE 0.2 MG/ML
INJECTION, SOLUTION INTRAMUSCULAR; INTRAVENOUS
Status: DISPENSED
Start: 2020-01-22

## (undated) RX ORDER — CEFAZOLIN SODIUM 2 G/100ML
INJECTION, SOLUTION INTRAVENOUS
Status: DISPENSED
Start: 2020-01-22

## (undated) RX ORDER — FENTANYL CITRATE 50 UG/ML
INJECTION, SOLUTION INTRAMUSCULAR; INTRAVENOUS
Status: DISPENSED
Start: 2020-03-09

## (undated) RX ORDER — ATROPINE SULFATE 0.4 MG/ML
AMPUL (ML) INJECTION
Status: DISPENSED
Start: 2020-03-10